# Patient Record
Sex: FEMALE | Race: WHITE | URBAN - METROPOLITAN AREA
[De-identification: names, ages, dates, MRNs, and addresses within clinical notes are randomized per-mention and may not be internally consistent; named-entity substitution may affect disease eponyms.]

---

## 2019-01-09 ENCOUNTER — HOSPITAL ENCOUNTER (OUTPATIENT)
Dept: OTHER | Facility: HOSPITAL | Age: 79
Discharge: HOME | End: 2019-01-09
Attending: INTERNAL MEDICINE

## 2019-01-09 LAB
25(OH)D3 SERPL-MCNC: 32.4 NG/ML (ref 30–100)
ALBUMIN SERPL-MCNC: 3.5 G/DL (ref 3.5–5)
ANION GAP SERPL CALC-SCNC: 16 MMOL/L (ref 8–19)
APPEARANCE UR: CLEAR
BASOPHILS # BLD AUTO: 0.06 10*3/UL (ref 0–0.2)
BASOPHILS NFR BLD AUTO: 0.73 % (ref 0–3)
BILIRUB UR QL: NEGATIVE
BUN SERPL-MCNC: 16 MG/DL (ref 5–25)
BUN/CREAT SERPL: 10 {RATIO} (ref 6–20)
CALCIUM SERPL-MCNC: 9.2 MG/DL (ref 8.7–10.4)
CHLORIDE SERPL-SCNC: 101 MMOL/L (ref 99–111)
COLOR UR: YELLOW
CONV CO2: 27 MMOL/L (ref 22–32)
CONV COLLECTION SOURCE (UA): ABNORMAL
CONV CREATININE URINE, RANDOM: 18.5 MG/DL (ref 10–300)
CONV PROTEIN TO CREATININE RATIO (RANDOM URINE): 0.32 {RATIO} (ref 0–0.1)
CONV UROBILINOGEN IN URINE BY AUTOMATED TEST STRIP: 0.2 {EHRLICHU}/DL (ref 0.1–1)
CREAT UR-MCNC: 1.58 MG/DL (ref 0.5–0.9)
EOSINOPHIL # BLD AUTO: 0.03 10*3/UL (ref 0–0.7)
EOSINOPHIL # BLD AUTO: 0.4 % (ref 0–7)
ERYTHROCYTE [DISTWIDTH] IN BLOOD BY AUTOMATED COUNT: 11.8 % (ref 11.5–14.5)
GFR SERPLBLD BASED ON 1.73 SQ M-ARVRAT: 31 ML/MIN/{1.73_M2}
GLUCOSE SERPL-MCNC: 75 MG/DL (ref 65–99)
GLUCOSE UR QL: NEGATIVE MG/DL
HBA1C MFR BLD: 13.1 G/DL (ref 12–16)
HCT VFR BLD AUTO: 39.4 % (ref 37–47)
HGB UR QL STRIP: NEGATIVE
KETONES UR QL STRIP: NEGATIVE MG/DL
LEUKOCYTE ESTERASE UR QL STRIP: ABNORMAL
LYMPHOCYTES # BLD AUTO: 2.47 10*3/UL (ref 1–5)
MCH RBC QN AUTO: 31.8 PG (ref 27–31)
MCHC RBC AUTO-ENTMCNC: 33.2 G/DL (ref 33–37)
MCV RBC AUTO: 95.8 FL (ref 81–99)
MONOCYTES # BLD AUTO: 0.68 10*3/UL (ref 0.2–1.2)
MONOCYTES NFR BLD AUTO: 8.6 % (ref 3–10)
NEUTROPHILS # BLD AUTO: 4.63 10*3/UL (ref 2–8)
NEUTROPHILS NFR BLD AUTO: 58.9 % (ref 30–85)
NITRITE UR QL STRIP: NEGATIVE
NRBC BLD AUTO-RTO: 0 % (ref 0–0.01)
PH UR STRIP.AUTO: 6 [PH] (ref 5–8)
PHOSPHATE SERPL-MCNC: 3.3 MG/DL (ref 2.4–4.5)
PLATELET # BLD AUTO: 171 10*3/UL (ref 130–400)
PMV BLD AUTO: 7.8 FL (ref 7.4–10.4)
POTASSIUM SERPL-SCNC: 4.2 MMOL/L (ref 3.5–5.3)
PROT UR QL: NEGATIVE MG/DL
PROT UR-MCNC: 6 MG/DL
PTH-INTACT SERPL-MCNC: 153.4 PG/ML (ref 11.1–79.5)
RBC # BLD AUTO: 4.11 10*6/UL (ref 4.2–5.4)
RBC #/AREA URNS HPF: ABNORMAL /[HPF]
SODIUM SERPL-SCNC: 140 MMOL/L (ref 135–147)
SP GR UR: <=1.005 (ref 1–1.03)
SQUAMOUS SPT QL MICRO: ABNORMAL /[HPF]
VARIANT LYMPHS NFR BLD MANUAL: 31.4 % (ref 20–45)
WBC # BLD AUTO: 7.87 10*3/UL (ref 4.8–10.8)
WBC #/AREA URNS HPF: ABNORMAL /[HPF]

## 2019-11-07 ENCOUNTER — OFFICE VISIT CONVERTED (OUTPATIENT)
Dept: FAMILY MEDICINE CLINIC | Facility: CLINIC | Age: 79
End: 2019-11-07
Attending: FAMILY MEDICINE

## 2021-05-09 VITALS
SYSTOLIC BLOOD PRESSURE: 126 MMHG | DIASTOLIC BLOOD PRESSURE: 80 MMHG | OXYGEN SATURATION: 96 % | HEIGHT: 63 IN | HEART RATE: 84 BPM | TEMPERATURE: 97.2 F

## 2023-09-12 ENCOUNTER — HOSPITAL ENCOUNTER (EMERGENCY)
Facility: HOSPITAL | Age: 83
Discharge: HOME | End: 2023-09-12
Attending: EMERGENCY MEDICINE
Payer: MEDICARE

## 2023-09-12 ENCOUNTER — APPOINTMENT (OUTPATIENT)
Dept: GENERAL RADIOLOGY | Facility: HOSPITAL | Age: 83
End: 2023-09-12
Payer: MEDICARE

## 2023-09-12 VITALS
HEART RATE: 107 BPM | RESPIRATION RATE: 13 BRPM | TEMPERATURE: 97.6 F | DIASTOLIC BLOOD PRESSURE: 75 MMHG | WEIGHT: 174.16 LBS | SYSTOLIC BLOOD PRESSURE: 142 MMHG | HEIGHT: 63 IN | OXYGEN SATURATION: 93 % | BODY MASS INDEX: 30.86 KG/M2

## 2023-09-12 DIAGNOSIS — S91.301A WOUND OF RIGHT FOOT: Primary | ICD-10-CM

## 2023-09-12 LAB
ALBUMIN SERPL-MCNC: 3.2 G/DL (ref 3.5–5.2)
ALBUMIN/GLOB SERPL: 1.1 G/DL
ALP SERPL-CCNC: 65 U/L (ref 39–117)
ALT SERPL W P-5'-P-CCNC: 8 U/L (ref 1–33)
ANION GAP SERPL CALCULATED.3IONS-SCNC: 6.3 MMOL/L (ref 5–15)
AST SERPL-CCNC: 13 U/L (ref 1–32)
BASOPHILS # BLD AUTO: 0.04 10*3/MM3 (ref 0–0.2)
BASOPHILS NFR BLD AUTO: 0.5 % (ref 0–1.5)
BILIRUB SERPL-MCNC: 0.2 MG/DL (ref 0–1.2)
BUN SERPL-MCNC: 16 MG/DL (ref 8–23)
BUN/CREAT SERPL: 18.6 (ref 7–25)
CALCIUM SPEC-SCNC: 8.2 MG/DL (ref 8.6–10.5)
CHLORIDE SERPL-SCNC: 94 MMOL/L (ref 98–107)
CO2 SERPL-SCNC: 35.7 MMOL/L (ref 22–29)
CREAT SERPL-MCNC: 0.86 MG/DL (ref 0.57–1)
CRP SERPL-MCNC: 3.67 MG/DL (ref 0–0.5)
DEPRECATED RDW RBC AUTO: 45 FL (ref 37–54)
EGFRCR SERPLBLD CKD-EPI 2021: 66.3 ML/MIN/1.73
EOSINOPHIL # BLD AUTO: 0.26 10*3/MM3 (ref 0–0.4)
EOSINOPHIL NFR BLD AUTO: 3.2 % (ref 0.3–6.2)
ERYTHROCYTE [DISTWIDTH] IN BLOOD BY AUTOMATED COUNT: 13.1 % (ref 12.3–15.4)
ERYTHROCYTE [SEDIMENTATION RATE] IN BLOOD: 16 MM/HR (ref 0–30)
GLOBULIN UR ELPH-MCNC: 2.8 GM/DL
GLUCOSE SERPL-MCNC: 112 MG/DL (ref 65–99)
HCT VFR BLD AUTO: 41 % (ref 34–46.6)
HGB BLD-MCNC: 13.4 G/DL (ref 12–15.9)
HOLD SPECIMEN: NORMAL
HOLD SPECIMEN: NORMAL
IMM GRANULOCYTES # BLD AUTO: 0.03 10*3/MM3 (ref 0–0.05)
IMM GRANULOCYTES NFR BLD AUTO: 0.4 % (ref 0–0.5)
LYMPHOCYTES # BLD AUTO: 2.17 10*3/MM3 (ref 0.7–3.1)
LYMPHOCYTES NFR BLD AUTO: 26.6 % (ref 19.6–45.3)
MCH RBC QN AUTO: 31.2 PG (ref 26.6–33)
MCHC RBC AUTO-ENTMCNC: 32.7 G/DL (ref 31.5–35.7)
MCV RBC AUTO: 95.6 FL (ref 79–97)
MONOCYTES # BLD AUTO: 0.52 10*3/MM3 (ref 0.1–0.9)
MONOCYTES NFR BLD AUTO: 6.4 % (ref 5–12)
NEUTROPHILS NFR BLD AUTO: 5.13 10*3/MM3 (ref 1.7–7)
NEUTROPHILS NFR BLD AUTO: 62.9 % (ref 42.7–76)
NRBC BLD AUTO-RTO: 0 /100 WBC (ref 0–0.2)
PLATELET # BLD AUTO: 243 10*3/MM3 (ref 140–450)
PMV BLD AUTO: 9.7 FL (ref 6–12)
POTASSIUM SERPL-SCNC: 3.2 MMOL/L (ref 3.5–5.2)
PROT SERPL-MCNC: 6 G/DL (ref 6–8.5)
RBC # BLD AUTO: 4.29 10*6/MM3 (ref 3.77–5.28)
SODIUM SERPL-SCNC: 136 MMOL/L (ref 136–145)
WBC NRBC COR # BLD: 8.15 10*3/MM3 (ref 3.4–10.8)
WHOLE BLOOD HOLD COAG: NORMAL
WHOLE BLOOD HOLD SPECIMEN: NORMAL

## 2023-09-12 PROCEDURE — 99283 EMERGENCY DEPT VISIT LOW MDM: CPT

## 2023-09-12 PROCEDURE — 85652 RBC SED RATE AUTOMATED: CPT | Performed by: EMERGENCY MEDICINE

## 2023-09-12 PROCEDURE — 80053 COMPREHEN METABOLIC PANEL: CPT | Performed by: EMERGENCY MEDICINE

## 2023-09-12 PROCEDURE — 73630 X-RAY EXAM OF FOOT: CPT

## 2023-09-12 PROCEDURE — 86140 C-REACTIVE PROTEIN: CPT | Performed by: EMERGENCY MEDICINE

## 2023-09-12 PROCEDURE — 85025 COMPLETE CBC W/AUTO DIFF WBC: CPT | Performed by: EMERGENCY MEDICINE

## 2023-09-12 RX ORDER — DOXYCYCLINE 100 MG/1
100 CAPSULE ORAL 2 TIMES DAILY
Qty: 28 CAPSULE | Refills: 0 | Status: SHIPPED | OUTPATIENT
Start: 2023-09-12

## 2023-09-12 NOTE — ED PROVIDER NOTES
"Time: 5:15 PM EDT  Date of encounter:  9/12/2023  Independent Historian/Clinical History and Information was obtained by:   Patient and Family    History is limited by:     Chief Complaint: Foot wound      History of Present Illness:  Patient is a 83 y.o. year old female who presents to the emergency department for evaluation of foot wound.  Per family she was sent over for a foot wound to her right foot.  Reports that has been there for several weeks to months and had gotten worse.  Was seen by podiatrist today who dressed it and send it to the hospital.  No other complaints this time.    HPI    Patient Care Team  Primary Care Provider: Provider, No Known    Past Medical History:     No Known Allergies  No past medical history on file.  No past surgical history on file.  No family history on file.    Home Medications:  Prior to Admission medications    Not on File        Social History:          Review of Systems:  Review of Systems   Skin:  Positive for wound.      Physical Exam:  /52   Pulse 89   Temp 97.6 °F (36.4 °C) (Oral)   Resp 13   Ht 160 cm (62.99\")   Wt 79 kg (174 lb 2.6 oz)   SpO2 94%   BMI 30.86 kg/m²     Physical Exam  Vitals and nursing note reviewed.   Constitutional:       Appearance: Normal appearance.   HENT:      Head: Normocephalic and atraumatic.   Eyes:      General: No scleral icterus.  Cardiovascular:      Rate and Rhythm: Normal rate and regular rhythm.   Pulmonary:      Effort: Pulmonary effort is normal.      Breath sounds: Normal breath sounds.   Abdominal:      Palpations: Abdomen is soft.      Tenderness: There is no abdominal tenderness.   Musculoskeletal:      Cervical back: Normal range of motion.      Comments: Foot wound to the right foot to the plantar surface at the base of the first digit.  See photo.   Skin:     Findings: No rash.   Neurological:      General: No focal deficit present.      Mental Status: She is alert.          "       Procedures:  Procedures      Medical Decision Making:      Comorbidities that affect care:    Chronic Kidney Disease    External Notes reviewed:    Reviewed nephrology note from 9/26/2022      The following orders were placed and all results were independently analyzed by me:  Orders Placed This Encounter   Procedures    XR Foot 3+ View Right    Comprehensive Metabolic Panel    [place] Draw    CBC Auto Differential    Sedimentation Rate    C-reactive Protein    Inpatient Podiatry Consult    CBC & Differential    Green Top (Gel)    Lavender Top    Gold Top - SST    Light Blue Top       Medications Given in the Emergency Department:  Medications - No data to display     ED Course:    ED Course as of 09/12/23 1838   Tue Sep 12, 2023   1827 Spoke with Dr. Solares who recommended CRP and ESR's.  I called back and spoke with him about the results as well as what appears to be of foreign body noted in the foot.  He recommends outpatient follow-up and prescribe doxycycline. [MA]      ED Course User Index  [MA] Marlo Jay MD       Labs:    Lab Results (last 24 hours)       Procedure Component Value Units Date/Time    CBC & Differential [[id number]]  (Normal) Collected: 09/12/23 1441    Specimen: Blood Updated: 09/12/23 1452    Narrative:      The following orders were created for panel order CBC & Differential.  Procedure                               Abnormality         Status                     ---------                               -----------         ------                     CBC Auto Differential[[id number]]        Normal              Final result                 Please view results for these tests on the individual orders.    CBC Auto Differential [[id number]]  (Normal) Collected: 09/12/23 1441    Specimen: Blood Updated: 09/12/23 1452     WBC 8.15 10*3/mm3      RBC 4.29 10*6/mm3      Hemoglobin 13.4 g/dL      Hematocrit 41.0 %      MCV 95.6 fL      MCH 31.2 pg      MCHC 32.7 g/dL      RDW 13.1 %       RDW-SD 45.0 fl      MPV 9.7 fL      Platelets 243 10*3/mm3      Neutrophil % 62.9 %      Lymphocyte % 26.6 %      Monocyte % 6.4 %      Eosinophil % 3.2 %      Basophil % 0.5 %      Immature Grans % 0.4 %      Neutrophils, Absolute 5.13 10*3/mm3      Lymphocytes, Absolute 2.17 10*3/mm3      Monocytes, Absolute 0.52 10*3/mm3      Eosinophils, Absolute 0.26 10*3/mm3      Basophils, Absolute 0.04 10*3/mm3      Immature Grans, Absolute 0.03 10*3/mm3      nRBC 0.0 /100 WBC     Sedimentation Rate [[id number]]  (Normal) Collected: 09/12/23 1441    Specimen: Blood Updated: 09/12/23 1731     Sed Rate 16 mm/hr     Comprehensive Metabolic Panel [[id number]]  (Abnormal) Collected: 09/12/23 1629    Specimen: Blood Updated: 09/12/23 1710     Glucose 112 mg/dL      BUN 16 mg/dL      Creatinine 0.86 mg/dL      Sodium 136 mmol/L      Potassium 3.2 mmol/L      Chloride 94 mmol/L      CO2 35.7 mmol/L      Calcium 8.2 mg/dL      Total Protein 6.0 g/dL      Albumin 3.2 g/dL      ALT (SGPT) 8 U/L      AST (SGOT) 13 U/L      Alkaline Phosphatase 65 U/L      Total Bilirubin 0.2 mg/dL      Globulin 2.8 gm/dL      A/G Ratio 1.1 g/dL      BUN/Creatinine Ratio 18.6     Anion Gap 6.3 mmol/L      eGFR 66.3 mL/min/1.73     Narrative:      GFR Normal >60  Chronic Kidney Disease <60  Kidney Failure <15    The GFR formula is only valid for adults with stable renal function between ages 18 and 70.    C-reactive Protein [[id number]]  (Abnormal) Collected: 09/12/23 1629    Specimen: Blood Updated: 09/12/23 1745     C-Reactive Protein 3.67 mg/dL              Imaging:    XR Foot 3+ View Right    Result Date: 9/12/2023  PROCEDURE: XR FOOT 3+ VW RIGHT  COMPARISON: None  INDICATIONS: Wound/ulcer at distal end of 1st metatarsal, been there since July  FINDINGS:  There is a metallic linear density measuring 2.5 cm with a L-shaped appearance overlying the plantar surface beneath the 4th and 5th metatarsals.  There is diffuse osteopenia with hyper extension  deformities of the 1st through 5th digits in flexion deformities of the PIP joints.  There is diffuse moderate degenerative change of the mid and hindfoot.  Focal soft tissue swelling is noted prominently beneath the distal aspect of the 1st metatarsal extending to the proximal aspect of the great toe with a defect compatible with ulceration.  No obvious destructive bone lesion identified.        1. Suspected metallic foreign body beneath the mid to lateral aspect of the midfoot.  Please correlate with clinical exam.  Findings concerning for imbedded foreign body. 2. Soft tissue swelling associated with the distal aspect of the medial foot extending into the proximal aspect of the great toe with underlying defect compatible with ulceration.  No obvious destruction of the underlying osseous structures noted given limitations from osteopenia 3. Diffuse degenerative changes noted      ALEXANDER HOGUE MD       Electronically Signed and Approved By: ALEXANDER HOGUE MD on 9/12/2023 at 18:26                Differential Diagnosis and Discussion:    Orthopedic Injuries: Differential diagnosis includes but is not limited to fractures, soft tissue injuries, dislocations, contusions, ligamentous injuries, tendon injuries, nerve injuries, compartment syndrome, bursitis, and vascular injuries.  Osteomyelitis, diabetic foot wound,  All labs were reviewed and interpreted by me.  All X-rays impressions were independently interpreted by me.    MDM     Patient with right foot wound.  Sent in here by podiatry for evaluation.  Labs are unremarkable.  Does appear to have a foreign body noted in the foot.  Spoke with his podiatrist who recommended doxycycline and outpatient follow-up.      Patient Care Considerations:          Consultants/Shared Management Plan:    Consultant: I have discussed the case with Dr. Solares who states recommends outpatient follow-up    Social Determinants of Health:    Patient is independent, reliable, and has  access to care.       Disposition and Care Coordination:    Discharged: The patient is suitable and stable for discharge with no need for consideration of observation or admission.    I have explained the patient´s condition, diagnoses and treatment plan based on the information available to me at this time. I have answered questions and addressed any concerns. The patient has a good  understanding of the patient´s diagnosis, condition, and treatment plan as can be expected at this point. The vital signs have been stable. The patient´s condition is stable and appropriate for discharge from the emergency department.      The patient will pursue further outpatient evaluation with the primary care physician or other designated or consulting physician as outlined in the discharge instructions. They are agreeable to this plan of care and follow-up instructions have been explained in detail. The patient has received these instructions in written format and have expressed an understanding of the discharge instructions. The patient is aware that any significant change in condition or worsening of symptoms should prompt an immediate return to this or the closest emergency department or call to 911.      Final diagnoses:   Wound of right foot        ED Disposition       ED Disposition   Discharge    Condition   Stable    Comment   --               This medical record created using voice recognition software.             Marlo Jay MD  09/12/23 8501

## 2023-10-04 ENCOUNTER — TRANSCRIBE ORDERS (OUTPATIENT)
Dept: ADMINISTRATIVE | Facility: HOSPITAL | Age: 83
End: 2023-10-04
Payer: MEDICARE

## 2023-10-04 DIAGNOSIS — L97.519 ULCER OF RIGHT FOOT, UNSPECIFIED ULCER STAGE: Primary | ICD-10-CM

## 2023-10-09 ENCOUNTER — HOSPITAL ENCOUNTER (OUTPATIENT)
Dept: MRI IMAGING | Facility: HOSPITAL | Age: 83
Discharge: HOME | End: 2023-10-09
Admitting: PODIATRIST
Payer: MEDICARE

## 2023-10-09 DIAGNOSIS — L97.519 ULCER OF RIGHT FOOT, UNSPECIFIED ULCER STAGE: ICD-10-CM

## 2023-10-09 PROCEDURE — 73718 MRI LOWER EXTREMITY W/O DYE: CPT

## 2023-10-18 ENCOUNTER — HOSPITAL ENCOUNTER (EMERGENCY)
Facility: HOSPITAL | Age: 83
Discharge: HOME | End: 2023-10-19
Attending: EMERGENCY MEDICINE
Payer: MEDICARE

## 2023-10-18 ENCOUNTER — APPOINTMENT (OUTPATIENT)
Dept: GENERAL RADIOLOGY | Facility: HOSPITAL | Age: 83
End: 2023-10-18
Payer: MEDICARE

## 2023-10-18 VITALS
BODY MASS INDEX: 31.85 KG/M2 | DIASTOLIC BLOOD PRESSURE: 75 MMHG | RESPIRATION RATE: 14 BRPM | TEMPERATURE: 98.1 F | OXYGEN SATURATION: 98 % | HEART RATE: 72 BPM | SYSTOLIC BLOOD PRESSURE: 144 MMHG | HEIGHT: 62 IN

## 2023-10-18 DIAGNOSIS — S91.339A PENETRATING WOUND OF FOOT, UNSPECIFIED LATERALITY, INITIAL ENCOUNTER: Primary | ICD-10-CM

## 2023-10-18 LAB
ALBUMIN SERPL-MCNC: 3.1 G/DL (ref 3.5–5.2)
ALBUMIN/GLOB SERPL: 1.1 G/DL
ALP SERPL-CCNC: 64 U/L (ref 39–117)
ALT SERPL W P-5'-P-CCNC: 12 U/L (ref 1–33)
ANION GAP SERPL CALCULATED.3IONS-SCNC: 5.8 MMOL/L (ref 5–15)
AST SERPL-CCNC: 21 U/L (ref 1–32)
BASOPHILS # BLD AUTO: 0.03 10*3/MM3 (ref 0–0.2)
BASOPHILS NFR BLD AUTO: 0.4 % (ref 0–1.5)
BILIRUB SERPL-MCNC: 0.3 MG/DL (ref 0–1.2)
BUN SERPL-MCNC: 14 MG/DL (ref 8–23)
BUN/CREAT SERPL: 16.7 (ref 7–25)
CALCIUM SPEC-SCNC: 9.3 MG/DL (ref 8.6–10.5)
CHLORIDE SERPL-SCNC: 92 MMOL/L (ref 98–107)
CO2 SERPL-SCNC: 36.2 MMOL/L (ref 22–29)
CREAT SERPL-MCNC: 0.84 MG/DL (ref 0.57–1)
CRP SERPL-MCNC: <0.3 MG/DL (ref 0–0.5)
D-LACTATE SERPL-SCNC: 0.9 MMOL/L (ref 0.5–2)
DEPRECATED RDW RBC AUTO: 45.5 FL (ref 37–54)
EGFRCR SERPLBLD CKD-EPI 2021: 68.2 ML/MIN/1.73
EOSINOPHIL # BLD AUTO: 0.17 10*3/MM3 (ref 0–0.4)
EOSINOPHIL NFR BLD AUTO: 2.2 % (ref 0.3–6.2)
ERYTHROCYTE [DISTWIDTH] IN BLOOD BY AUTOMATED COUNT: 12.7 % (ref 12.3–15.4)
ERYTHROCYTE [SEDIMENTATION RATE] IN BLOOD: <1 MM/HR (ref 0–30)
GLOBULIN UR ELPH-MCNC: 2.9 GM/DL
GLUCOSE SERPL-MCNC: 100 MG/DL (ref 65–99)
HCT VFR BLD AUTO: 43.7 % (ref 34–46.6)
HGB BLD-MCNC: 13.5 G/DL (ref 12–15.9)
IMM GRANULOCYTES # BLD AUTO: 0.02 10*3/MM3 (ref 0–0.05)
IMM GRANULOCYTES NFR BLD AUTO: 0.3 % (ref 0–0.5)
LYMPHOCYTES # BLD AUTO: 2.27 10*3/MM3 (ref 0.7–3.1)
LYMPHOCYTES NFR BLD AUTO: 29.9 % (ref 19.6–45.3)
MCH RBC QN AUTO: 29.7 PG (ref 26.6–33)
MCHC RBC AUTO-ENTMCNC: 30.9 G/DL (ref 31.5–35.7)
MCV RBC AUTO: 96 FL (ref 79–97)
MONOCYTES # BLD AUTO: 0.61 10*3/MM3 (ref 0.1–0.9)
MONOCYTES NFR BLD AUTO: 8 % (ref 5–12)
NEUTROPHILS NFR BLD AUTO: 4.48 10*3/MM3 (ref 1.7–7)
NEUTROPHILS NFR BLD AUTO: 59.2 % (ref 42.7–76)
NRBC BLD AUTO-RTO: 0 /100 WBC (ref 0–0.2)
PLATELET # BLD AUTO: 195 10*3/MM3 (ref 140–450)
PMV BLD AUTO: 10.3 FL (ref 6–12)
POTASSIUM SERPL-SCNC: 3.9 MMOL/L (ref 3.5–5.2)
PROT SERPL-MCNC: 6 G/DL (ref 6–8.5)
RBC # BLD AUTO: 4.55 10*6/MM3 (ref 3.77–5.28)
SODIUM SERPL-SCNC: 134 MMOL/L (ref 136–145)
WBC NRBC COR # BLD: 7.58 10*3/MM3 (ref 3.4–10.8)

## 2023-10-18 PROCEDURE — 85025 COMPLETE CBC W/AUTO DIFF WBC: CPT | Performed by: EMERGENCY MEDICINE

## 2023-10-18 PROCEDURE — 80053 COMPREHEN METABOLIC PANEL: CPT | Performed by: EMERGENCY MEDICINE

## 2023-10-18 PROCEDURE — 73630 X-RAY EXAM OF FOOT: CPT

## 2023-10-18 PROCEDURE — 85652 RBC SED RATE AUTOMATED: CPT | Performed by: EMERGENCY MEDICINE

## 2023-10-18 PROCEDURE — 99283 EMERGENCY DEPT VISIT LOW MDM: CPT

## 2023-10-18 PROCEDURE — 36415 COLL VENOUS BLD VENIPUNCTURE: CPT | Performed by: EMERGENCY MEDICINE

## 2023-10-18 PROCEDURE — 83605 ASSAY OF LACTIC ACID: CPT | Performed by: EMERGENCY MEDICINE

## 2023-10-18 PROCEDURE — 86140 C-REACTIVE PROTEIN: CPT | Performed by: EMERGENCY MEDICINE

## 2023-10-18 PROCEDURE — 87040 BLOOD CULTURE FOR BACTERIA: CPT | Performed by: EMERGENCY MEDICINE

## 2023-10-18 RX ORDER — CHLORTHALIDONE 25 MG/1
25 TABLET ORAL DAILY
COMMUNITY

## 2023-10-18 RX ORDER — CEPHALEXIN 500 MG/1
500 CAPSULE ORAL 3 TIMES DAILY
Qty: 21 CAPSULE | Refills: 0 | Status: SHIPPED | OUTPATIENT
Start: 2023-10-18

## 2023-10-18 RX ORDER — LEVOTHYROXINE SODIUM 0.12 MG/1
125 TABLET ORAL DAILY
COMMUNITY

## 2023-10-18 RX ORDER — ONDANSETRON 4 MG/1
4 TABLET, FILM COATED ORAL EVERY 8 HOURS PRN
COMMUNITY

## 2023-10-18 RX ORDER — GABAPENTIN 800 MG/1
800 TABLET ORAL 3 TIMES DAILY
COMMUNITY

## 2023-10-18 RX ORDER — ROPINIROLE 0.25 MG/1
0.25 TABLET, FILM COATED ORAL NIGHTLY
COMMUNITY

## 2023-10-18 RX ORDER — HYDROCODONE BITARTRATE AND ACETAMINOPHEN 5; 325 MG/1; MG/1
1 TABLET ORAL EVERY 6 HOURS PRN
COMMUNITY

## 2023-10-18 RX ORDER — NYSTATIN 100000 [USP'U]/G
1 POWDER TOPICAL 2 TIMES DAILY
COMMUNITY

## 2023-10-18 RX ORDER — RALOXIFENE HYDROCHLORIDE 60 MG/1
60 TABLET, FILM COATED ORAL DAILY
COMMUNITY

## 2023-10-18 RX ORDER — POTASSIUM CHLORIDE 750 MG/1
10 CAPSULE, EXTENDED RELEASE ORAL 2 TIMES DAILY
COMMUNITY

## 2023-10-18 RX ORDER — SULFAMETHOXAZOLE AND TRIMETHOPRIM 400; 80 MG/1; MG/1
1 TABLET ORAL 2 TIMES DAILY
COMMUNITY

## 2023-10-18 RX ORDER — HYDROXYCHLOROQUINE SULFATE 200 MG/1
200 TABLET, FILM COATED ORAL DAILY
COMMUNITY

## 2023-10-18 RX ORDER — DOXYCYCLINE HYCLATE 100 MG/1
100 CAPSULE ORAL 2 TIMES DAILY
COMMUNITY

## 2023-10-18 RX ORDER — ALLOPURINOL 100 MG/1
100 TABLET ORAL DAILY
COMMUNITY

## 2023-10-18 RX ORDER — BACLOFEN 10 MG/1
5 TABLET ORAL 3 TIMES DAILY
COMMUNITY

## 2023-10-18 NOTE — ED NOTES
Pt podiatrist reports that they seen a piece of metal in the pt right foot on an xray that they do not know when it was taken. EMS report the office called EMS to the office to transport the pt to the ED.

## 2023-10-18 NOTE — ED PROVIDER NOTES
Time: 5:58 PM EDT  Date of encounter:  10/18/2023  Independent Historian/Clinical History and Information was obtained by:   Patient and Family    History is limited by: N/A    Chief Complaint   Patient presents with    Skin Ulcer         History of Present Illness:  Patient is a 83 y.o. year old female who presents to the emergency department for evaluation of skin ulcer. Was at the podiatrist office and was concerned as there was metal fragment in right foot x-ray and unsure when it got in there. Family says has had wound since June. Denies pain. Has had 2 MRI's scheduled but first was canceled 2/2 having betadine on foot and then per chart review the second was not performed as initial imaging showed metal fragment. Had XR in September that showed metal.  They are unsure when this got in there. Does have peripheral neuropathy at baseline. (Raquel Hedrick PA-C provider in triage 5:58 PM EDT )     Patient Care Team  Primary Care Provider: Provider, No Known    Past Medical History:     Allergies   Allergen Reactions    Penicillins Unknown - High Severity     Past Medical History:   Diagnosis Date    Atrial fibrillation     Difficulty walking     Hypokalemia     Hypothyroidism     Idiopathic progressive neuropathy     Muscle weakness     Systemic lupus      History reviewed. No pertinent surgical history.  History reviewed. No pertinent family history.    Home Medications:  Prior to Admission medications    Medication Sig Start Date End Date Taking? Authorizing Provider   allopurinol (ZYLOPRIM) 100 MG tablet Take 1 tablet by mouth Daily.    ProviderNilesh MD   baclofen (LIORESAL) 10 MG tablet Take 0.5 tablets by mouth 3 (Three) Times a Day.    Nilesh Sharp MD   chlorthalidone (HYGROTON) 25 MG tablet Take 1 tablet by mouth Daily.    Nilesh Sharp MD   doxycycline (MONODOX) 100 MG capsule Take 1 capsule by mouth 2 (Two) Times a Day. 9/12/23   Marlo Jay MD   doxycycline (VIBRAMYCIN)  100 MG capsule Take 1 capsule by mouth 2 (Two) Times a Day.    Nilesh Sharp MD   gabapentin (NEURONTIN) 800 MG tablet Take 1 tablet by mouth 3 (Three) Times a Day.    Nilesh Sharp MD   HYDROcodone-acetaminophen (NORCO) 5-325 MG per tablet Take 1 tablet by mouth Every 6 (Six) Hours As Needed.    Nilesh Sharp MD   hydroxychloroquine (PLAQUENIL) 200 MG tablet Take 1 tablet by mouth Daily.    Nliesh Sharp MD   ipratropium (ATROVENT) 0.02 % nebulizer solution Take 2.5 mL by nebulization 4 (Four) Times a Day.    Nilesh Sharp MD   levothyroxine (SYNTHROID, LEVOTHROID) 125 MCG tablet Take 1 tablet by mouth Daily.    Nilesh Sharp MD   nystatin (MYCOSTATIN) 128775 UNIT/GM powder Apply 1 application  topically to the appropriate area as directed 2 (Two) Times a Day.    Nilesh Sharp MD   ondansetron (ZOFRAN) 4 MG tablet Take 1 tablet by mouth Every 8 (Eight) Hours As Needed for Nausea or Vomiting.    Nilesh Sharp MD   potassium chloride (MICRO-K) 10 MEQ CR capsule Take 1 capsule by mouth 2 (Two) Times a Day.    Nilesh Sharp MD   raloxifene (EVISTA) 60 MG tablet Take 1 tablet by mouth Daily.    Nilesh Sharp MD   rOPINIRole (REQUIP) 0.25 MG tablet Take 1 tablet by mouth Every Night. Take 1 hour before bedtime.    Nilesh Sharp MD   sulfamethoxazole-trimethoprim (BACTRIM,SEPTRA) 400-80 MG tablet Take 1 tablet by mouth 2 (Two) Times a Day.    Nilesh Sharp MD   tuberculin (Tubersol) 5 UNIT/0.1ML injection Inject 0.1 mL into the appropriate area of the skin as directed by provider 1 (One) Time.    Nilesh Sharp MD        Social History:   Social History     Tobacco Use    Smoking status: Never    Smokeless tobacco: Never   Vaping Use    Vaping Use: Never used   Substance Use Topics    Alcohol use: Never    Drug use: Never         Review of Systems:  Review of Systems   Constitutional:  Negative for chills and fever.  "  HENT:  Negative for congestion, rhinorrhea and sore throat.    Eyes:  Negative for pain and visual disturbance.   Respiratory:  Negative for apnea, cough, chest tightness and shortness of breath.    Cardiovascular:  Negative for chest pain and palpitations.   Gastrointestinal:  Negative for abdominal pain, diarrhea, nausea and vomiting.   Genitourinary:  Negative for difficulty urinating and dysuria.   Musculoskeletal:  Negative for joint swelling and myalgias.   Skin:  Negative for color change.   Neurological:  Negative for seizures and headaches.   Psychiatric/Behavioral: Negative.     All other systems reviewed and are negative.       Physical Exam:  /75   Pulse 72   Temp 98.1 °F (36.7 °C) (Oral)   Resp 14   Ht 157.5 cm (62\")   SpO2 98%   BMI 31.85 kg/m²         Physical Exam  Vitals and nursing note reviewed.   Constitutional:       General: She is not in acute distress.     Appearance: Normal appearance. She is not toxic-appearing.   HENT:      Head: Normocephalic and atraumatic.      Jaw: There is normal jaw occlusion.   Eyes:      General: Lids are normal.      Extraocular Movements: Extraocular movements intact.      Conjunctiva/sclera: Conjunctivae normal.      Pupils: Pupils are equal, round, and reactive to light.   Cardiovascular:      Rate and Rhythm: Normal rate and regular rhythm.      Pulses: Normal pulses.      Heart sounds: Normal heart sounds.   Pulmonary:      Effort: Pulmonary effort is normal. No respiratory distress.      Breath sounds: Normal breath sounds. No wheezing or rhonchi.   Abdominal:      General: Abdomen is flat.      Palpations: Abdomen is soft.      Tenderness: There is no abdominal tenderness. There is no guarding or rebound.   Musculoskeletal:         General: Normal range of motion.      Cervical back: Normal range of motion and neck supple.      Right lower leg: No edema.      Left lower leg: No edema.      Comments: (+) Ulceration to the bottom of the right " foot    (-) Purulent drainage   Skin:     General: Skin is warm and dry.   Neurological:      Mental Status: She is alert and oriented to person, place, and time. Mental status is at baseline.   Psychiatric:         Mood and Affect: Mood normal.                      Procedures:  Procedures      Medical Decision Making:      Comorbidities that affect care:    None    External Notes reviewed:    Previous ED note was reviewed and the patient was discharged after a work-up.      The following orders were placed and all results were independently analyzed by me:  Orders Placed This Encounter   Procedures    Blood Culture - Blood,    Blood Culture - Blood,    XR Foot 3+ View Right    Comprehensive Metabolic Panel    Lactic Acid, Plasma    CBC Auto Differential    Sedimentation Rate    C-reactive Protein    CBC & Differential       Medications Given in the Emergency Department:  Medications - No data to display     ED Course:    The patient was initially evaluated in the triage area where orders were placed. The patient was later dispositioned by Sadie Bull MD.      The patient was advised to stay for completion of workup which includes but is not limited to communication of labs and radiological results, reassessment and plan. The patient was advised that leaving prior to disposition by a provider could result in critical findings that are not communicated to the patient.          Labs:    Lab Results (last 24 hours)       Procedure Component Value Units Date/Time    CBC & Differential [[id number]]  (Abnormal) Collected: 10/18/23 1845    Specimen: Blood Updated: 10/18/23 1858    Narrative:      The following orders were created for panel order CBC & Differential.  Procedure                               Abnormality         Status                     ---------                               -----------         ------                     CBC Auto Differential[[id number]]        Abnormal            Final result                  Please view results for these tests on the individual orders.    Blood Culture - Blood, Arm, Left [[id number]] Collected: 10/18/23 1845    Specimen: Blood from Arm, Left Updated: 10/18/23 1855    Blood Culture - Blood, Arm, Right [[id number]] Collected: 10/18/23 1845    Specimen: Blood from Arm, Right Updated: 10/18/23 1855    Lactic Acid, Plasma [[id number]]  (Normal) Collected: 10/18/23 1845    Specimen: Blood Updated: 10/18/23 1936     Lactate 0.9 mmol/L     CBC Auto Differential [[id number]]  (Abnormal) Collected: 10/18/23 1845    Specimen: Blood Updated: 10/18/23 1858     WBC 7.58 10*3/mm3      RBC 4.55 10*6/mm3      Hemoglobin 13.5 g/dL      Hematocrit 43.7 %      MCV 96.0 fL      MCH 29.7 pg      MCHC 30.9 g/dL      RDW 12.7 %      RDW-SD 45.5 fl      MPV 10.3 fL      Platelets 195 10*3/mm3      Neutrophil % 59.2 %      Lymphocyte % 29.9 %      Monocyte % 8.0 %      Eosinophil % 2.2 %      Basophil % 0.4 %      Immature Grans % 0.3 %      Neutrophils, Absolute 4.48 10*3/mm3      Lymphocytes, Absolute 2.27 10*3/mm3      Monocytes, Absolute 0.61 10*3/mm3      Eosinophils, Absolute 0.17 10*3/mm3      Basophils, Absolute 0.03 10*3/mm3      Immature Grans, Absolute 0.02 10*3/mm3      nRBC 0.0 /100 WBC     Sedimentation Rate [[id number]]  (Normal) Collected: 10/18/23 1943    Specimen: Blood Updated: 10/18/23 2019     Sed Rate <1 mm/hr     C-reactive Protein [[id number]]  (Normal) Collected: 10/18/23 1943    Specimen: Blood Updated: 10/18/23 2021     C-Reactive Protein <0.30 mg/dL     Comprehensive Metabolic Panel [[id number]]  (Abnormal) Collected: 10/18/23 2038    Specimen: Blood from Arm, Left Updated: 10/18/23 2113     Glucose 100 mg/dL      BUN 14 mg/dL      Creatinine 0.84 mg/dL      Sodium 134 mmol/L      Potassium 3.9 mmol/L      Comment: Slight hemolysis detected by analyzer. Results may be affected.        Chloride 92 mmol/L      CO2 36.2 mmol/L      Calcium 9.3 mg/dL      Total Protein 6.0 g/dL       Albumin 3.1 g/dL      ALT (SGPT) 12 U/L      AST (SGOT) 21 U/L      Alkaline Phosphatase 64 U/L      Total Bilirubin 0.3 mg/dL      Globulin 2.9 gm/dL      A/G Ratio 1.1 g/dL      BUN/Creatinine Ratio 16.7     Anion Gap 5.8 mmol/L      eGFR 68.2 mL/min/1.73     Narrative:      GFR Normal >60  Chronic Kidney Disease <60  Kidney Failure <15    The GFR formula is only valid for adults with stable renal function between ages 18 and 70.             Imaging:    XR Foot 3+ View Right    Result Date: 10/18/2023  PROCEDURE: XR FOOT 3+ VW RIGHT  COMPARISON: [facility], CR, XR FOOT 3+ VW RIGHT, 9/12/2023, 18:08.  INDICATIONS: ULCER ON BOTTOM OF RIGHT FOOT AT THE 1ST MTP JOINT  FINDINGS:  BONES: The bones are osteopenic.  There is hallux valgus deformity.  Degenerative changes are present in the midfoot and 1st MTP joint.  Calcaneal enthesophytes are present.  No fractures or acute osseous abnormalities are identified. SOFT TISSUES: Negative.  No visible soft tissue swelling.  EFFUSION: None visible.  OTHER: 2.9 cm linear metallic body in the plantar soft tissues of the right foot.       Stable 2.9 cm linear metallic foreign body in the plantar soft tissues of the right foot.  No acute osseous abnormalities are identified.      KOBY WOODARD MD       Electronically Signed and Approved By: KOBY WOODARD MD on 10/18/2023 at 16:43                Differential Diagnosis and Discussion:      Extremity Pain: Differential diagnosis includes but is not limited to soft tissue sprain, tendonitis, tendon injury, dislocation, fracture, deep vein thrombosis, arterial insufficiency, osteoarthritis, bursitis, and ligamentous damage.    All labs were reviewed and interpreted by me.  All X-rays impressions were independently interpreted by me.    MDM     Amount and/or Complexity of Data Reviewed  Clinical lab tests: reviewed  Tests in the radiology section of CPT®: reviewed       The patient´s CBC that was reviewed and  interpreted by me shows no abnormalities of critical concern. Of note, there is no anemia requiring a blood transfusion and the platelet count is acceptable.  The patient´s CMP that was reviewed and interpretted by me shows no abnormalities of critical concern. Of note, the patient´s sodium and potassium are acceptable. The patient´s liver enzymes are unremarkable. The patient´s renal function (creatinine) is preserved. The patient has a normal anion gap.  ESR and CRP are within normal limits.      Patient Care Considerations:    MRI: I considered ordering an MRI however the patient will be seen by podiatry in the next 2 days.      Consultants/Shared Management Plan:    Case was discussed with Dr. Yoon who states that the patient can be discharged with close follow-up in his office in the next 1 to 2 days.    Social Determinants of Health:    Patient has presented with family members who are responsible, reliable and will ensure follow up care.      Disposition and Care Coordination:    Discharged: I considered escalation of care by admitting this patient for observation, however the patient has improved and is suitable and  stable for discharge.    I have explained the patient´s condition, diagnoses and treatment plan based on the information available to me at this time. I have answered questions and addressed any concerns. The patient has a good  understanding of the patient´s diagnosis, condition, and treatment plan as can be expected at this point. The vital signs have been stable. The patient´s condition is stable and appropriate for discharge from the emergency department.      The patient will pursue further outpatient evaluation with the primary care physician or other designated or consulting physician as outlined in the discharge instructions. They are agreeable to this plan of care and follow-up instructions have been explained in detail. The patient has received these instructions in written format  and have expressed an understanding of the discharge instructions. The patient is aware that any significant change in condition or worsening of symptoms should prompt an immediate return to this or the closest emergency department or call to 911.  I have explained discharge medications and the need for follow up with the patient/caretakers. This was also printed in the discharge instructions. Patient was discharged with the following medications and follow up:      Medication List        New Prescriptions      cephalexin 500 MG capsule  Commonly known as: KEFLEX  Take 1 capsule by mouth 3 (Three) Times a Day.               Where to Get Your Medications        These medications were sent to [id number] - [place], [place] - [address] AT [facility] [place] - [phone]  - [phone] fx  [address], [place] [address]      Phone: [phone]   cephalexin 500 MG capsule      Thuan Yoon, DPM  [address]  [place] [id number] [phone]             Final diagnoses:   Penetrating wound of foot, unspecified laterality, initial encounter        ED Disposition       ED Disposition   Discharge    Condition   Stable    Comment   --               This medical record created using voice recognition software.             Sadie Bull MD  10/18/23 [id number]

## 2023-10-23 LAB
BACTERIA SPEC AEROBE CULT: NORMAL
BACTERIA SPEC AEROBE CULT: NORMAL

## 2023-10-25 ENCOUNTER — TRANSCRIBE ORDERS (OUTPATIENT)
Dept: ADMINISTRATIVE | Facility: HOSPITAL | Age: 83
End: 2023-10-25
Payer: MEDICARE

## 2023-10-25 DIAGNOSIS — R09.89 INADEQUATE PERIPHERAL BLOOD FLOW: Primary | ICD-10-CM

## 2023-10-31 ENCOUNTER — TRANSCRIBE ORDERS (OUTPATIENT)
Dept: ADMINISTRATIVE | Facility: HOSPITAL | Age: 83
End: 2023-10-31
Payer: MEDICARE

## 2023-10-31 DIAGNOSIS — R13.12 OROPHARYNGEAL DYSPHAGIA: Primary | ICD-10-CM

## 2023-11-08 ENCOUNTER — HOSPITAL ENCOUNTER (OUTPATIENT)
Facility: HOSPITAL | Age: 83
Discharge: HOME | End: 2023-11-08
Admitting: NURSE PRACTITIONER
Payer: MEDICARE

## 2023-11-08 DIAGNOSIS — R09.89 INADEQUATE PERIPHERAL BLOOD FLOW: ICD-10-CM

## 2023-11-08 LAB
BH CV GRAFT BRACHIAL PRESSURE LEFT: 148 MMHG
BH CV GRAFT BRACHIAL PRESSURE RIGHT: 137 MMHG
BH CV LEA LEFT DPA PRESSURE: 150 MMHG
BH CV LEA LEFT PTA PRESSURE: 149 MMHG
BH CV LEA RIGHT ANT TIBIAL A DISTAL EDV: 0 CM/S
BH CV LEA RIGHT ANT TIBIAL A DISTAL PSV: 77 CM/S
BH CV LEA RIGHT ANT TIBIAL A MID EDV: 0 CM/S
BH CV LEA RIGHT ANT TIBIAL A MID PSV: 69 CM/S
BH CV LEA RIGHT ANT TIBIAL A PROX EDV: 0 CM/S
BH CV LEA RIGHT ANT TIBIAL A PROX PSV: 72 CM/S
BH CV LEA RIGHT CFA DISTAL EDV: 0 CM/S
BH CV LEA RIGHT CFA DISTAL PSV: 122 CM/S
BH CV LEA RIGHT CFA PROX EDV: 0 CM/S
BH CV LEA RIGHT CFA PROX PSV: 105 CM/S
BH CV LEA RIGHT DFA PROX EDV: 0 CM/S
BH CV LEA RIGHT DFA PROX PSV: 65 CM/S
BH CV LEA RIGHT DPA PRESSURE: 153 MMHG
BH CV LEA RIGHT PERONEAL  DISTAL EDV: 0 CM/S
BH CV LEA RIGHT PERONEAL  DISTAL PSV: 29 CM/S
BH CV LEA RIGHT PERONEAL  MID EDV: 0 CM/S
BH CV LEA RIGHT PERONEAL  MID PSV: 49 CM/S
BH CV LEA RIGHT PERONEAL  PROX EDV: 0 CM/S
BH CV LEA RIGHT PERONEAL  PROX PSV: 62 CM/S
BH CV LEA RIGHT POPITEAL A  DISTAL EDV: 0 CM/S
BH CV LEA RIGHT POPITEAL A  DISTAL PSV: 80 CM/S
BH CV LEA RIGHT POPITEAL A  MID EDV: 0 CM/S
BH CV LEA RIGHT POPITEAL A  MID PSV: 74 CM/S
BH CV LEA RIGHT POPITEAL A  PROX EDV: 0 CM/S
BH CV LEA RIGHT POPITEAL A  PROX PSV: 62 CM/S
BH CV LEA RIGHT PTA DISTAL EDV: 4 CM/S
BH CV LEA RIGHT PTA DISTAL PSV: 23 CM/S
BH CV LEA RIGHT PTA MID EDV: 6 CM/S
BH CV LEA RIGHT PTA MID PSV: 20 CM/S
BH CV LEA RIGHT PTA PRESSURE: 148 MMHG
BH CV LEA RIGHT PTA PROX EDV: 0 CM/S
BH CV LEA RIGHT PTA PROX PSV: 53 CM/S
BH CV LEA RIGHT SFA DISTAL EDV: 0 CM/S
BH CV LEA RIGHT SFA DISTAL PSV: 93 CM/S
BH CV LEA RIGHT SFA MID EDV: 0 CM/S
BH CV LEA RIGHT SFA MID PSV: 86 CM/S
BH CV LEA RIGHT SFA PROX EDV: 0 CM/S
BH CV LEA RIGHT SFA PROX PSV: 145 CM/S
BH CV LEA RIGHT TIBEOPERONEAL EDV: 0 CM/S
BH CV LEA RIGHT TIBEOPERONEAL PSV: 71 CM/S
BH CV LOWER ARTERIAL LEFT ABI RATIO: 1.01
BH CV LOWER ARTERIAL RIGHT ABI RATIO: 1.03

## 2023-11-08 PROCEDURE — 93926 LOWER EXTREMITY STUDY: CPT

## 2023-12-12 ENCOUNTER — APPOINTMENT (OUTPATIENT)
Dept: GENERAL RADIOLOGY | Facility: HOSPITAL | Age: 83
DRG: 194 | End: 2023-12-12
Payer: MEDICARE

## 2023-12-12 ENCOUNTER — HOSPITAL ENCOUNTER (INPATIENT)
Facility: HOSPITAL | Age: 83
LOS: 4 days | Discharge: NURSING, REHAB OR LONG-TERM CARE | DRG: 194 | End: 2023-12-16
Attending: EMERGENCY MEDICINE | Admitting: FAMILY MEDICINE
Payer: MEDICARE

## 2023-12-12 ENCOUNTER — APPOINTMENT (OUTPATIENT)
Dept: CT IMAGING | Facility: HOSPITAL | Age: 83
DRG: 194 | End: 2023-12-12
Payer: MEDICARE

## 2023-12-12 DIAGNOSIS — I48.19 PERSISTENT ATRIAL FIBRILLATION: ICD-10-CM

## 2023-12-12 DIAGNOSIS — M32.9 SYSTEMIC LUPUS ERYTHEMATOSUS, UNSPECIFIED SLE TYPE, UNSPECIFIED ORGAN INVOLVEMENT STATUS: ICD-10-CM

## 2023-12-12 DIAGNOSIS — R06.02 SHORTNESS OF BREATH: ICD-10-CM

## 2023-12-12 DIAGNOSIS — R13.12 OROPHARYNGEAL DYSPHAGIA: ICD-10-CM

## 2023-12-12 DIAGNOSIS — R26.2 DIFFICULTY WALKING: ICD-10-CM

## 2023-12-12 DIAGNOSIS — J18.9 PNEUMONIA DUE TO INFECTIOUS ORGANISM, UNSPECIFIED LATERALITY, UNSPECIFIED PART OF LUNG: ICD-10-CM

## 2023-12-12 DIAGNOSIS — N18.4 STAGE 4 CHRONIC KIDNEY DISEASE: ICD-10-CM

## 2023-12-12 DIAGNOSIS — E03.9 HYPOTHYROIDISM (ACQUIRED): ICD-10-CM

## 2023-12-12 DIAGNOSIS — Z86.59 HISTORY OF DEMENTIA: ICD-10-CM

## 2023-12-12 DIAGNOSIS — J96.01 ACUTE RESPIRATORY FAILURE WITH HYPOXIA: Primary | ICD-10-CM

## 2023-12-12 PROBLEM — J96.20 ACUTE-ON-CHRONIC RESPIRATORY FAILURE: Status: ACTIVE | Noted: 2023-12-12

## 2023-12-12 PROBLEM — R06.00 DYSPNEA: Status: ACTIVE | Noted: 2023-12-12

## 2023-12-12 LAB
ALBUMIN SERPL-MCNC: 2.7 G/DL (ref 3.5–5.2)
ALBUMIN/GLOB SERPL: 1.1 G/DL
ALP SERPL-CCNC: 57 U/L (ref 39–117)
ALT SERPL W P-5'-P-CCNC: 6 U/L (ref 1–33)
ANION GAP SERPL CALCULATED.3IONS-SCNC: 7.6 MMOL/L (ref 5–15)
ARTERIAL PATENCY WRIST A: POSITIVE
AST SERPL-CCNC: 13 U/L (ref 1–32)
BACTERIA UR QL AUTO: ABNORMAL /HPF
BASE EXCESS BLDA CALC-SCNC: 11.6 MMOL/L (ref -2–2)
BASOPHILS # BLD AUTO: 0.02 10*3/MM3 (ref 0–0.2)
BASOPHILS NFR BLD AUTO: 0.3 % (ref 0–1.5)
BDY SITE: ABNORMAL
BILIRUB SERPL-MCNC: 0.4 MG/DL (ref 0–1.2)
BILIRUB UR QL STRIP: NEGATIVE
BUN SERPL-MCNC: 7 MG/DL (ref 8–23)
BUN/CREAT SERPL: 14.3 (ref 7–25)
CALCIUM SPEC-SCNC: 8.6 MG/DL (ref 8.6–10.5)
CHLORIDE SERPL-SCNC: 89 MMOL/L (ref 98–107)
CLARITY UR: ABNORMAL
CO2 SERPL-SCNC: 36.4 MMOL/L (ref 22–29)
COHGB MFR BLD: 0.3 % (ref 0–1.5)
COLOR UR: YELLOW
CREAT SERPL-MCNC: 0.49 MG/DL (ref 0.57–1)
D-LACTATE SERPL-SCNC: 1.3 MMOL/L (ref 0.5–2)
DEPRECATED RDW RBC AUTO: 46.4 FL (ref 37–54)
EGFRCR SERPLBLD CKD-EPI 2021: 92.5 ML/MIN/1.73
EOSINOPHIL # BLD AUTO: 0.22 10*3/MM3 (ref 0–0.4)
EOSINOPHIL NFR BLD AUTO: 2.8 % (ref 0.3–6.2)
ERYTHROCYTE [DISTWIDTH] IN BLOOD BY AUTOMATED COUNT: 12.8 % (ref 12.3–15.4)
FHHB: 4.3 % (ref 0–5)
FLUAV SUBTYP SPEC NAA+PROBE: NOT DETECTED
FLUBV RNA ISLT QL NAA+PROBE: NOT DETECTED
GAS FLOW AIRWAY: 2 LPM
GEN 5 2HR TROPONIN T REFLEX: 70 NG/L
GLOBULIN UR ELPH-MCNC: 2.5 GM/DL
GLUCOSE SERPL-MCNC: 96 MG/DL (ref 65–99)
GLUCOSE UR STRIP-MCNC: NEGATIVE MG/DL
HCO3 BLDA-SCNC: 39.5 MMOL/L (ref 22–26)
HCT VFR BLD AUTO: 42 % (ref 34–46.6)
HGB BLD-MCNC: 12.7 G/DL (ref 12–15.9)
HGB BLDA-MCNC: 13.3 G/DL (ref 11.7–14.6)
HGB UR QL STRIP.AUTO: ABNORMAL
HOLD SPECIMEN: NORMAL
HOLD SPECIMEN: NORMAL
HYALINE CASTS UR QL AUTO: ABNORMAL /LPF
IMM GRANULOCYTES # BLD AUTO: 0.03 10*3/MM3 (ref 0–0.05)
IMM GRANULOCYTES NFR BLD AUTO: 0.4 % (ref 0–0.5)
INHALED O2 CONCENTRATION: 28 %
KETONES UR QL STRIP: NEGATIVE
LEUKOCYTE ESTERASE UR QL STRIP.AUTO: NEGATIVE
LYMPHOCYTES # BLD AUTO: 2.02 10*3/MM3 (ref 0.7–3.1)
LYMPHOCYTES NFR BLD AUTO: 25.7 % (ref 19.6–45.3)
MCH RBC QN AUTO: 29.8 PG (ref 26.6–33)
MCHC RBC AUTO-ENTMCNC: 30.2 G/DL (ref 31.5–35.7)
MCV RBC AUTO: 98.6 FL (ref 79–97)
METHGB BLD QL: 0.2 % (ref 0–1.5)
MODALITY: ABNORMAL
MONOCYTES # BLD AUTO: 0.55 10*3/MM3 (ref 0.1–0.9)
MONOCYTES NFR BLD AUTO: 7 % (ref 5–12)
NEUTROPHILS NFR BLD AUTO: 5.01 10*3/MM3 (ref 1.7–7)
NEUTROPHILS NFR BLD AUTO: 63.8 % (ref 42.7–76)
NITRITE UR QL STRIP: NEGATIVE
NRBC BLD AUTO-RTO: 0 /100 WBC (ref 0–0.2)
NT-PROBNP SERPL-MCNC: 1621 PG/ML (ref 0–1800)
OXYHGB MFR BLDV: 95.2 % (ref 94–99)
PCO2 BLDA: 68 MM HG (ref 35–45)
PH BLDA: 7.38 PH UNITS (ref 7.35–7.45)
PH UR STRIP.AUTO: 7.5 [PH] (ref 5–8)
PLATELET # BLD AUTO: 150 10*3/MM3 (ref 140–450)
PMV BLD AUTO: 10.7 FL (ref 6–12)
PO2 BLD: 297 MM[HG] (ref 0–500)
PO2 BLDA: 83.2 MM HG (ref 80–100)
POTASSIUM SERPL-SCNC: 3.8 MMOL/L (ref 3.5–5.2)
PROT SERPL-MCNC: 5.2 G/DL (ref 6–8.5)
PROT UR QL STRIP: NEGATIVE
QT INTERVAL: 317 MS
QT INTERVAL: 343 MS
QTC INTERVAL: 403 MS
QTC INTERVAL: 427 MS
RBC # BLD AUTO: 4.26 10*6/MM3 (ref 3.77–5.28)
RBC # UR STRIP: ABNORMAL /HPF
REF LAB TEST METHOD: ABNORMAL
RSV RNA NPH QL NAA+NON-PROBE: NOT DETECTED
SAO2 % BLDCOA: 95.7 % (ref 95–99)
SARS-COV-2 RNA RESP QL NAA+PROBE: NOT DETECTED
SODIUM SERPL-SCNC: 133 MMOL/L (ref 136–145)
SP GR UR STRIP: 1.02 (ref 1–1.03)
SQUAMOUS #/AREA URNS HPF: ABNORMAL /HPF
TROPONIN T DELTA: 5 NG/L
TROPONIN T SERPL HS-MCNC: 65 NG/L
TSH SERPL DL<=0.05 MIU/L-ACNC: 0.34 UIU/ML (ref 0.27–4.2)
UROBILINOGEN UR QL STRIP: ABNORMAL
WBC # UR STRIP: ABNORMAL /HPF
WBC NRBC COR # BLD AUTO: 7.85 10*3/MM3 (ref 3.4–10.8)
WHOLE BLOOD HOLD COAG: NORMAL
WHOLE BLOOD HOLD SPECIMEN: NORMAL

## 2023-12-12 PROCEDURE — 83880 ASSAY OF NATRIURETIC PEPTIDE: CPT

## 2023-12-12 PROCEDURE — 80053 COMPREHEN METABOLIC PANEL: CPT | Performed by: EMERGENCY MEDICINE

## 2023-12-12 PROCEDURE — 82375 ASSAY CARBOXYHB QUANT: CPT | Performed by: FAMILY MEDICINE

## 2023-12-12 PROCEDURE — 87040 BLOOD CULTURE FOR BACTERIA: CPT | Performed by: EMERGENCY MEDICINE

## 2023-12-12 PROCEDURE — 25010000002 FUROSEMIDE PER 20 MG: Performed by: FAMILY MEDICINE

## 2023-12-12 PROCEDURE — 99285 EMERGENCY DEPT VISIT HI MDM: CPT

## 2023-12-12 PROCEDURE — 84443 ASSAY THYROID STIM HORMONE: CPT | Performed by: FAMILY MEDICINE

## 2023-12-12 PROCEDURE — 83050 HGB METHEMOGLOBIN QUAN: CPT | Performed by: FAMILY MEDICINE

## 2023-12-12 PROCEDURE — 71045 X-RAY EXAM CHEST 1 VIEW: CPT

## 2023-12-12 PROCEDURE — 99223 1ST HOSP IP/OBS HIGH 75: CPT | Performed by: FAMILY MEDICINE

## 2023-12-12 PROCEDURE — 25010000002 AZITHROMYCIN PER 500 MG: Performed by: FAMILY MEDICINE

## 2023-12-12 PROCEDURE — 71260 CT THORAX DX C+: CPT

## 2023-12-12 PROCEDURE — 25010000002 CEFTRIAXONE PER 250 MG: Performed by: EMERGENCY MEDICINE

## 2023-12-12 PROCEDURE — 36600 WITHDRAWAL OF ARTERIAL BLOOD: CPT | Performed by: FAMILY MEDICINE

## 2023-12-12 PROCEDURE — 86738 MYCOPLASMA ANTIBODY: CPT | Performed by: FAMILY MEDICINE

## 2023-12-12 PROCEDURE — 25510000001 IOPAMIDOL PER 1 ML: Performed by: EMERGENCY MEDICINE

## 2023-12-12 PROCEDURE — 84484 ASSAY OF TROPONIN QUANT: CPT | Performed by: EMERGENCY MEDICINE

## 2023-12-12 PROCEDURE — P9612 CATHETERIZE FOR URINE SPEC: HCPCS

## 2023-12-12 PROCEDURE — 93005 ELECTROCARDIOGRAM TRACING: CPT

## 2023-12-12 PROCEDURE — 25810000003 SODIUM CHLORIDE 0.9 % SOLUTION: Performed by: FAMILY MEDICINE

## 2023-12-12 PROCEDURE — 82805 BLOOD GASES W/O2 SATURATION: CPT | Performed by: FAMILY MEDICINE

## 2023-12-12 PROCEDURE — 81001 URINALYSIS AUTO W/SCOPE: CPT | Performed by: EMERGENCY MEDICINE

## 2023-12-12 PROCEDURE — 87637 SARSCOV2&INF A&B&RSV AMP PRB: CPT | Performed by: EMERGENCY MEDICINE

## 2023-12-12 PROCEDURE — 85025 COMPLETE CBC W/AUTO DIFF WBC: CPT

## 2023-12-12 PROCEDURE — 93005 ELECTROCARDIOGRAM TRACING: CPT | Performed by: FAMILY MEDICINE

## 2023-12-12 PROCEDURE — 83605 ASSAY OF LACTIC ACID: CPT | Performed by: EMERGENCY MEDICINE

## 2023-12-12 PROCEDURE — 93005 ELECTROCARDIOGRAM TRACING: CPT | Performed by: EMERGENCY MEDICINE

## 2023-12-12 PROCEDURE — 36415 COLL VENOUS BLD VENIPUNCTURE: CPT

## 2023-12-12 RX ORDER — SODIUM CHLORIDE 0.9 % (FLUSH) 0.9 %
10 SYRINGE (ML) INJECTION EVERY 12 HOURS SCHEDULED
Status: DISCONTINUED | OUTPATIENT
Start: 2023-12-12 | End: 2023-12-16 | Stop reason: HOSPADM

## 2023-12-12 RX ORDER — BISACODYL 5 MG/1
5 TABLET, DELAYED RELEASE ORAL DAILY PRN
Status: DISCONTINUED | OUTPATIENT
Start: 2023-12-12 | End: 2023-12-16 | Stop reason: HOSPADM

## 2023-12-12 RX ORDER — FUROSEMIDE 10 MG/ML
40 INJECTION INTRAMUSCULAR; INTRAVENOUS ONCE
Status: COMPLETED | OUTPATIENT
Start: 2023-12-12 | End: 2023-12-12

## 2023-12-12 RX ORDER — SODIUM CHLORIDE 9 MG/ML
40 INJECTION, SOLUTION INTRAVENOUS AS NEEDED
Status: DISCONTINUED | OUTPATIENT
Start: 2023-12-12 | End: 2023-12-16 | Stop reason: HOSPADM

## 2023-12-12 RX ORDER — GUAIFENESIN 600 MG/1
600 TABLET, EXTENDED RELEASE ORAL EVERY 12 HOURS SCHEDULED
Status: DISCONTINUED | OUTPATIENT
Start: 2023-12-12 | End: 2023-12-12 | Stop reason: CLARIF

## 2023-12-12 RX ORDER — CEFTRIAXONE SODIUM 1 G/50ML
1000 INJECTION, SOLUTION INTRAVENOUS EVERY 24 HOURS
Status: DISCONTINUED | OUTPATIENT
Start: 2023-12-13 | End: 2023-12-14

## 2023-12-12 RX ORDER — HYDROCODONE BITARTRATE AND ACETAMINOPHEN 10; 325 MG/1; MG/1
1 TABLET ORAL EVERY 6 HOURS PRN
Status: ON HOLD | COMMUNITY
End: 2023-12-16 | Stop reason: SDUPTHER

## 2023-12-12 RX ORDER — ENOXAPARIN SODIUM 100 MG/ML
40 INJECTION SUBCUTANEOUS DAILY
Status: DISCONTINUED | OUTPATIENT
Start: 2023-12-13 | End: 2023-12-16 | Stop reason: HOSPADM

## 2023-12-12 RX ORDER — BENZONATATE 100 MG/1
200 CAPSULE ORAL 3 TIMES DAILY PRN
Status: DISCONTINUED | OUTPATIENT
Start: 2023-12-12 | End: 2023-12-16 | Stop reason: HOSPADM

## 2023-12-12 RX ORDER — SODIUM CHLORIDE 0.9 % (FLUSH) 0.9 %
10 SYRINGE (ML) INJECTION AS NEEDED
Status: DISCONTINUED | OUTPATIENT
Start: 2023-12-12 | End: 2023-12-16 | Stop reason: HOSPADM

## 2023-12-12 RX ORDER — CEFTRIAXONE SODIUM 1 G/50ML
1000 INJECTION, SOLUTION INTRAVENOUS ONCE
Status: COMPLETED | OUTPATIENT
Start: 2023-12-12 | End: 2023-12-12

## 2023-12-12 RX ORDER — HYDROXYCHLOROQUINE SULFATE 200 MG/1
200 TABLET, FILM COATED ORAL DAILY
Status: DISCONTINUED | OUTPATIENT
Start: 2023-12-13 | End: 2023-12-16 | Stop reason: HOSPADM

## 2023-12-12 RX ORDER — IPRATROPIUM BROMIDE AND ALBUTEROL SULFATE 2.5; .5 MG/3ML; MG/3ML
3 SOLUTION RESPIRATORY (INHALATION) EVERY 4 HOURS PRN
Status: DISCONTINUED | OUTPATIENT
Start: 2023-12-12 | End: 2023-12-16 | Stop reason: HOSPADM

## 2023-12-12 RX ORDER — HYDROCODONE BITARTRATE AND ACETAMINOPHEN 7.5; 325 MG/1; MG/1
1 TABLET ORAL ONCE
Status: COMPLETED | OUTPATIENT
Start: 2023-12-12 | End: 2023-12-12

## 2023-12-12 RX ORDER — GABAPENTIN 400 MG/1
800 CAPSULE ORAL EVERY 8 HOURS SCHEDULED
Status: DISCONTINUED | OUTPATIENT
Start: 2023-12-12 | End: 2023-12-16 | Stop reason: HOSPADM

## 2023-12-12 RX ORDER — BISACODYL 10 MG
10 SUPPOSITORY, RECTAL RECTAL DAILY PRN
Status: DISCONTINUED | OUTPATIENT
Start: 2023-12-12 | End: 2023-12-16 | Stop reason: HOSPADM

## 2023-12-12 RX ORDER — POLYETHYLENE GLYCOL 3350 17 G/17G
17 POWDER, FOR SOLUTION ORAL DAILY PRN
Status: DISCONTINUED | OUTPATIENT
Start: 2023-12-12 | End: 2023-12-16 | Stop reason: HOSPADM

## 2023-12-12 RX ORDER — LEVOTHYROXINE SODIUM 0.12 MG/1
125 TABLET ORAL
Status: DISCONTINUED | OUTPATIENT
Start: 2023-12-13 | End: 2023-12-16 | Stop reason: HOSPADM

## 2023-12-12 RX ORDER — NYSTATIN 100000 [USP'U]/G
POWDER TOPICAL EVERY 12 HOURS SCHEDULED
Status: DISCONTINUED | OUTPATIENT
Start: 2023-12-12 | End: 2023-12-16 | Stop reason: HOSPADM

## 2023-12-12 RX ORDER — ROPINIROLE 0.25 MG/1
0.5 TABLET, FILM COATED ORAL NIGHTLY
Status: DISCONTINUED | OUTPATIENT
Start: 2023-12-12 | End: 2023-12-16 | Stop reason: HOSPADM

## 2023-12-12 RX ORDER — AMOXICILLIN 250 MG
2 CAPSULE ORAL 2 TIMES DAILY
Status: DISCONTINUED | OUTPATIENT
Start: 2023-12-12 | End: 2023-12-16 | Stop reason: HOSPADM

## 2023-12-12 RX ORDER — GUAIFENESIN 200 MG/10ML
300 LIQUID ORAL EVERY 6 HOURS
Status: DISCONTINUED | OUTPATIENT
Start: 2023-12-12 | End: 2023-12-16 | Stop reason: HOSPADM

## 2023-12-12 RX ORDER — SENNOSIDES A AND B 8.6 MG/1
1 TABLET, FILM COATED ORAL DAILY
COMMUNITY

## 2023-12-12 RX ORDER — HYDROCODONE BITARTRATE AND ACETAMINOPHEN 10; 325 MG/1; MG/1
1 TABLET ORAL EVERY 6 HOURS PRN
Status: DISCONTINUED | OUTPATIENT
Start: 2023-12-12 | End: 2023-12-16 | Stop reason: HOSPADM

## 2023-12-12 RX ADMIN — AZITHROMYCIN 500 MG: 500 INJECTION, POWDER, LYOPHILIZED, FOR SOLUTION INTRAVENOUS at 22:35

## 2023-12-12 RX ADMIN — NYSTATIN: 100000 POWDER TOPICAL at 22:34

## 2023-12-12 RX ADMIN — GABAPENTIN 800 MG: 400 CAPSULE ORAL at 23:31

## 2023-12-12 RX ADMIN — ROPINIROLE HYDROCHLORIDE 0.5 MG: 0.25 TABLET, FILM COATED ORAL at 23:31

## 2023-12-12 RX ADMIN — FUROSEMIDE 40 MG: 10 INJECTION, SOLUTION INTRAMUSCULAR; INTRAVENOUS at 21:46

## 2023-12-12 RX ADMIN — Medication 10 ML: at 21:45

## 2023-12-12 RX ADMIN — HYDROCODONE BITARTRATE AND ACETAMINOPHEN 1 TABLET: 7.5; 325 TABLET ORAL at 17:41

## 2023-12-12 RX ADMIN — CEFTRIAXONE SODIUM 1000 MG: 1 INJECTION, SOLUTION INTRAVENOUS at 18:01

## 2023-12-12 RX ADMIN — GUAIFENESIN 300 MG: 200 SOLUTION ORAL at 23:31

## 2023-12-12 RX ADMIN — IOPAMIDOL 100 ML: 755 INJECTION, SOLUTION INTRAVENOUS at 16:56

## 2023-12-12 NOTE — ED NOTES
Pt complaining of pain in her legs, order obtained from Dr. Jay to given her home pain medication which he is ordering.

## 2023-12-12 NOTE — Clinical Note
Level of Care: Remote Telemetry [26]   Diagnosis: Dyspnea [837125]   Admitting Physician: MAYTE MCKAY [[id number]]

## 2023-12-12 NOTE — ED PROVIDER NOTES
Time: 4:01 PM EST  Date of encounter:  12/12/2023  Independent Historian/Clinical History and Information was obtained by:   Patient and Family    History is limited by: N/A    Chief Complaint: Shortness of breath      History of Present Illness:  Patient is a 83 y.o. year old female who presents to the emergency department for evaluation of shortness of breath.  Patient has a history of A-fib, lupus, generalized weakness who presents with complaints of shortness of breath and hypoxia.  Patient chronically on 2 L nasal cannula at the nursing home.  Had oxygen saturations in the 70s and 80s and was sent here for further eval.  They report her oxygen is low when she is laying flat but is improved when she sits up.  She states that she has had a cough and just has not felt well.  Family reports that she has been on 2 rounds of antibiotics recently for pneumonia.  No other complaints this time    HPI    Patient Care Team  Primary Care Provider: Brady Rae MD    Past Medical History:     Allergies   Allergen Reactions    Penicillins Unknown - High Severity     Past Medical History:   Diagnosis Date    Atrial fibrillation     Difficulty walking     Hypokalemia     Hypothyroidism     Idiopathic progressive neuropathy     Muscle weakness     Systemic lupus      History reviewed. No pertinent surgical history.  History reviewed. No pertinent family history.    Home Medications:  Prior to Admission medications    Medication Sig Start Date End Date Taking? Authorizing Provider   allopurinol (ZYLOPRIM) 100 MG tablet Take 1 tablet by mouth Daily.    ProviderNilesh MD   baclofen (LIORESAL) 10 MG tablet Take 0.5 tablets by mouth 3 (Three) Times a Day.    Nilesh Sharp MD   cephalexin (KEFLEX) 500 MG capsule Take 1 capsule by mouth 3 (Three) Times a Day. 10/18/23   Sadie Bull MD   chlorthalidone (HYGROTON) 25 MG tablet Take 1 tablet by mouth Daily.    ProviderNilesh MD   doxycycline  (MONODOX) 100 MG capsule Take 1 capsule by mouth 2 (Two) Times a Day. 9/12/23   Marlo Jay MD   doxycycline (VIBRAMYCIN) 100 MG capsule Take 1 capsule by mouth 2 (Two) Times a Day.    Nilesh Sharp MD   gabapentin (NEURONTIN) 800 MG tablet Take 1 tablet by mouth 3 (Three) Times a Day.    Nilesh Sharp MD   HYDROcodone-acetaminophen (NORCO) 5-325 MG per tablet Take 1 tablet by mouth Every 6 (Six) Hours As Needed.    Nilesh Sharp MD   hydroxychloroquine (PLAQUENIL) 200 MG tablet Take 1 tablet by mouth Daily.    Nilesh Sharp MD   ipratropium (ATROVENT) 0.02 % nebulizer solution Take 2.5 mL by nebulization 4 (Four) Times a Day.    Nilesh Sharp MD   levothyroxine (SYNTHROID, LEVOTHROID) 125 MCG tablet Take 1 tablet by mouth Daily.    Nilesh Sharp MD   nystatin (MYCOSTATIN) 521014 UNIT/GM powder Apply 1 application  topically to the appropriate area as directed 2 (Two) Times a Day.    Nilesh Sharp MD   ondansetron (ZOFRAN) 4 MG tablet Take 1 tablet by mouth Every 8 (Eight) Hours As Needed for Nausea or Vomiting.    Nilesh Sharp MD   potassium chloride (MICRO-K) 10 MEQ CR capsule Take 1 capsule by mouth 2 (Two) Times a Day.    Nilesh Sharp MD   raloxifene (EVISTA) 60 MG tablet Take 1 tablet by mouth Daily.    Nilesh Sharp MD   rOPINIRole (REQUIP) 0.25 MG tablet Take 1 tablet by mouth Every Night. Take 1 hour before bedtime.    Nilesh Sharp MD   sulfamethoxazole-trimethoprim (BACTRIM,SEPTRA) 400-80 MG tablet Take 1 tablet by mouth 2 (Two) Times a Day.    Nilesh Sharp MD   tuberculin (Tubersol) 5 UNIT/0.1ML injection Inject 0.1 mL into the appropriate area of the skin as directed by provider 1 (One) Time.    Nilesh Sharp MD        Social History:   Social History     Tobacco Use    Smoking status: Never    Smokeless tobacco: Never   Vaping Use    Vaping Use: Never used   Substance Use Topics    Alcohol  "use: Never    Drug use: Never         Review of Systems:  Review of Systems   Respiratory:  Positive for shortness of breath.         Physical Exam:  /95   Pulse 111   Temp 97.5 °F (36.4 °C) (Oral)   Resp 22   Ht 162.6 cm (64\")   Wt 74.6 kg (164 lb 7.4 oz)   SpO2 98%   BMI 28.23 kg/m²     Physical Exam  Vitals and nursing note reviewed.   Constitutional:       Appearance: Normal appearance.   HENT:      Head: Normocephalic and atraumatic.   Eyes:      General: No scleral icterus.  Cardiovascular:      Rate and Rhythm: Normal rate and regular rhythm.      Heart sounds: Normal heart sounds.   Pulmonary:      Comments: Decreased inspiration, decreased breath sounds bilaterally.  Coarse breath sounds noted on exam.  Abdominal:      Palpations: Abdomen is soft.      Tenderness: There is no abdominal tenderness.   Musculoskeletal:         General: Normal range of motion.      Cervical back: Normal range of motion.   Skin:     Findings: No rash.   Neurological:      General: No focal deficit present.      Mental Status: She is alert.                  Procedures:  Procedures      Medical Decision Making:      Comorbidities that affect care:    COPD    External Notes reviewed:    Reviewed ER note from 10/18/2023      The following orders were placed and all results were independently analyzed by me:  Orders Placed This Encounter   Procedures    COVID-19, FLU A/B, RSV PCR 1 HR TAT - Swab, Nasopharynx    Blood Culture - Blood,    Blood Culture - Blood,    XR Chest 1 View    CT Chest With Contrast Diagnostic    [place] Draw    Comprehensive Metabolic Panel    BNP    Single High Sensitivity Troponin T    CBC Auto Differential    High Sensitivity Troponin T 2Hr    Urinalysis With Culture If Indicated - Urine, Clean Catch    Urinalysis, Microscopic Only - Urine, Clean Catch    Lactic Acid, Plasma    NPO Diet NPO Type: Strict NPO    Undress & Gown    Continuous Pulse Oximetry    Vital Signs    Inpatient Hospitalist " Consult    Oxygen Therapy- Nasal Cannula; Titrate 1-6 LPM Per SpO2; 90 - 95%    ECG 12 Lead ED Triage Standing Order; SOA    Insert Peripheral IV    CBC & Differential    Green Top (Gel)    Lavender Top    Gold Top - SST    Light Blue Top       Medications Given in the Emergency Department:  Medications   sodium chloride 0.9 % flush 10 mL (has no administration in time range)   cefTRIAXone (ROCEPHIN) IVPB 1,000 mg (1,000 mg Intravenous New Bag 12/12/23 1801)   iopamidol (ISOVUE-370) 76 % injection 100 mL (100 mL Intravenous Given 12/12/23 1656)   HYDROcodone-acetaminophen (NORCO) 7.5-325 MG per tablet 1 tablet (1 tablet Oral Given 12/12/23 1741)        ED Course:    ED Course as of 12/12/23 1824   Tue Dec 12, 2023   1558 EKG interpreted by me  Time: 1423  Heart rate 102  Sinus with multiple PACs, LVH with repolarization abnormalities, nonspecific ST changes [MA]   1738 Family does report that she has been treated with antibiotics for the last couple weeks but has not had improvement.  Discussed admission to the hospital for further workup management.  Will place on antibiotics and admit [MA]   1823 Spoke with Dr. Hinojosa who agrees to admit [MA]      ED Course User Index  [MA] Marlo Jay MD       Labs:    Lab Results (last 24 hours)       Procedure Component Value Units Date/Time    CBC & Differential [[id number]]  (Abnormal) Collected: 12/12/23 1438    Specimen: Blood Updated: 12/12/23 1446    Narrative:      The following orders were created for panel order CBC & Differential.  Procedure                               Abnormality         Status                     ---------                               -----------         ------                     CBC Auto Differential[[id number]]        Abnormal            Final result                 Please view results for these tests on the individual orders.    BNP [[id number]]  (Normal) Collected: 12/12/23 1438    Specimen: Blood Updated: 12/12/23 1503     proBNP  1,621.0 pg/mL     Narrative:      This assay is used as an aid in the diagnosis of individuals suspected of having heart failure. It can be used as an aid in the diagnosis of acute decompensated heart failure (ADHF) in patients presenting with signs and symptoms of ADHF to the emergency department (ED). In addition, NT-proBNP of <300 pg/mL indicates ADHF is not likely.    Age Range Result Interpretation  NT-proBNP Concentration (pg/mL:      <50             Positive            >450                   Gray                 300-450                    Negative             <300    50-75           Positive            >900                  Gray                300-900                  Negative            <300      >75             Positive            >1800                  Gray                300-1800                  Negative            <300    CBC Auto Differential [[id number]]  (Abnormal) Collected: 12/12/23 1438    Specimen: Blood Updated: 12/12/23 1446     WBC 7.85 10*3/mm3      RBC 4.26 10*6/mm3      Hemoglobin 12.7 g/dL      Hematocrit 42.0 %      MCV 98.6 fL      MCH 29.8 pg      MCHC 30.2 g/dL      RDW 12.8 %      RDW-SD 46.4 fl      MPV 10.7 fL      Platelets 150 10*3/mm3      Neutrophil % 63.8 %      Lymphocyte % 25.7 %      Monocyte % 7.0 %      Eosinophil % 2.8 %      Basophil % 0.3 %      Immature Grans % 0.4 %      Neutrophils, Absolute 5.01 10*3/mm3      Lymphocytes, Absolute 2.02 10*3/mm3      Monocytes, Absolute 0.55 10*3/mm3      Eosinophils, Absolute 0.22 10*3/mm3      Basophils, Absolute 0.02 10*3/mm3      Immature Grans, Absolute 0.03 10*3/mm3      nRBC 0.0 /100 WBC     Comprehensive Metabolic Panel [[id number]]  (Abnormal) Collected: 12/12/23 1520    Specimen: Blood from Arm, Left Updated: 12/12/23 1601     Glucose 96 mg/dL      BUN 7 mg/dL      Creatinine 0.49 mg/dL      Sodium 133 mmol/L      Potassium 3.8 mmol/L      Chloride 89 mmol/L      CO2 36.4 mmol/L      Calcium 8.6 mg/dL      Total Protein 5.2  g/dL      Albumin 2.7 g/dL      ALT (SGPT) 6 U/L      AST (SGOT) 13 U/L      Alkaline Phosphatase 57 U/L      Total Bilirubin 0.4 mg/dL      Globulin 2.5 gm/dL      A/G Ratio 1.1 g/dL      BUN/Creatinine Ratio 14.3     Anion Gap 7.6 mmol/L      eGFR 92.5 mL/min/1.73     Narrative:      GFR Normal >60  Chronic Kidney Disease <60  Kidney Failure <15    The GFR formula is only valid for adults with stable renal function between ages 18 and 70.    Single High Sensitivity Troponin T [[id number]]  (Abnormal) Collected: 12/12/23 1520    Specimen: Blood from Arm, Left Updated: 12/12/23 1600     HS Troponin T 65 ng/L     Narrative:      High Sensitive Troponin T Reference Range:  <14.0 ng/L- Negative Female for AMI  <22.0 ng/L- Negative Male for AMI  >=14 - Abnormal Female indicating possible myocardial injury.  >=22 - Abnormal Male indicating possible myocardial injury.   Clinicians would have to utilize clinical acumen, EKG, Troponin, and serial changes to determine if it is an Acute Myocardial Infarction or myocardial injury due to an underlying chronic condition.         COVID-19, FLU A/B, RSV PCR 1 HR TAT - Swab, Nasopharynx [[id number]]  (Normal) Collected: 12/12/23 1609    Specimen: Swab from Nasopharynx Updated: 12/12/23 1710     COVID19 Not Detected     Influenza A PCR Not Detected     Influenza B PCR Not Detected     RSV, PCR Not Detected    Narrative:      Fact sheet for providers: https://www.fda.gov/media/263233/download    Fact sheet for patients: https://www.fda.gov/media/939604/download    Test performed by PCR.    Urinalysis With Culture If Indicated - Straight Cath [[id number]]  (Abnormal) Collected: 12/12/23 1619    Specimen: Urine from Straight Cath Updated: 12/12/23 1727     Color, UA Yellow     Appearance, UA Slightly Cloudy     pH, UA 7.5     Specific Gravity, UA 1.020     Glucose, UA Negative     Ketones, UA Negative     Bilirubin, UA Negative     Blood, UA Trace     Protein, UA Negative     Leuk  Esterase, UA Negative     Nitrite, UA Negative     Urobilinogen, UA 0.2 E.U./dL    Narrative:      In absence of clinical symptoms, the presence of pyuria, bacteria, and/or nitrites on the urinalysis result does not correlate with infection.    Urinalysis, Microscopic Only - Straight Cath [[id number]]  (Abnormal) Collected: 12/12/23 1619    Specimen: Urine from Straight Cath Updated: 12/12/23 1802     RBC, UA 0-2 /HPF      WBC, UA 0-2 /HPF      Comment: Urine culture not indicated.        Bacteria, UA Trace /HPF      Squamous Epithelial Cells, UA None Seen /HPF      Hyaline Casts, UA None Seen /LPF      Methodology Manual Light Microscopy    Lactic Acid, Plasma [[id number]] Collected: 12/12/23 1755    Specimen: Blood Updated: 12/12/23 1816    Blood Culture - Blood, Arm, Left [[id number]] Collected: 12/12/23 1755    Specimen: Blood from Arm, Left Updated: 12/12/23 1816    Blood Culture - Blood, Arm, Left [[id number]] Collected: 12/12/23 1801    Specimen: Blood from Arm, Left Updated: 12/12/23 1816             Imaging:    CT Chest With Contrast Diagnostic    Result Date: 12/12/2023  PROCEDURE: CT CHEST W CONTRAST DIAGNOSTIC  COMPARISON:  [facility], CR, XR CHEST 1 VW, 12/12/2023, 15:09. INDICATIONS: Short of air, cough  TECHNIQUE: After obtaining the patient's consent, CT images were obtained with non-ionic intravenous contrast material.   PROTOCOL:   Standard imaging protocol performed    RADIATION:   DLP: 617.7mGy*cm   Automated exposure control was utilized to minimize radiation dose. CONTRAST: 100cc Isovue 370 I.V.  FINDINGS:  Pulmonary arteries: there is excellent opacification of the pulmonary arteries.  No intraluminal filling defects are seen.  There is limited evaluation of the subsegmental lower lobe pulmonary arteries due to consolidation.  Main, right left pulmonary artery are upper limits normal in size.  There is cardiomegaly.  There are no pathologically enlarged hilar or mediastinal lymph  nodes.  No significant coronary artery atherosclerotic calcification.  Thoracic aorta has normal caliber.  No pericardial effusion.  There are1 small bilateral pleural effusions.  There is bilateral lower lobe consolidation with volume loss.  Atelectasis versus pneumonia.  There is linear atelectasis in the anterior inferior right upper lobe.  There are no endobronchial lesions.  There are no suspicious pulmonary nodules.  Upper abdomen is unremarkable.  Soft tissues are unremarkable.  There are no aggressive focal lytic or sclerotic osseous lesions.  There is osteopenia.          1. No findings of pulmonary embolus.  Limited evaluation of the lower lobe subsegmental pulmonary arteries due to volume loss and pleural effusions and consolidation. 2. Bibasilar airspace opacities atelectasis versus pneumonia with small bilateral pleural effusions.  Recommend clinical correlation. 3. Mild cardiomegaly.     JUANA HURTADO MD       Electronically Signed and Approved By: JUANA HURTADO MD on 12/12/2023 at 17:04             XR Chest 1 View    Result Date: 12/12/2023  PROCEDURE: XR CHEST 1 VW  COMPARISON: [facility], , CHEST AP/PA 1 VIEW, 7/13/2019, 12:31.  INDICATIONS: cough congestion  FINDINGS:  The depth of inspiration is poor.  There are atelectatic changes in the right upper and left basilar area.  There are no large pleural effusions.        1. Poor inspiration. 2. Atelectatic changes noted involving the lungs.  Whether in part some of the findings may be secondary to underlying multi focal pneumonia is uncertain.       KAYDEN VILLASEÑOR MD       Electronically Signed and Approved By: KAYDEN VILLASEÑOR MD on 12/12/2023 at 15:11                Differential Diagnosis and Discussion:    Dyspnea: Differential diagnosis includes but is not limited to metabolic acidosis, neurological disorders, psychogenic, asthma, pneumothorax, upper airway obstruction, COPD, pneumonia, noncardiogenic pulmonary edema, interstitial lung  disease, anemia, congestive heart failure, and pulmonary embolism    All labs were reviewed and interpreted by me.  All X-rays impressions were independently interpreted by me.  EKG was interpreted by me.  CT scan radiology impression was interpreted by me.    MDM     Amount and/or Complexity of Data Reviewed  Decide to obtain previous medical records or to obtain history from someone other than the patient: yes       Patient is 83-year-old female who presents with complaints of shortness of breath.  Has been treated for pneumonia over the last couple weeks and has had a general decline over that time.  She also reports that her oxygen has been dropping into the 80s and 70s despite using oxygen at the nursing home.  Is better when she sits up or worse when she is laying down.  CT shows what appears to be pneumonia with some pleural effusions.  She is not taking great inspirations here in the emergency room.  She is not hypoxic on her 2 L sitting up but is worse when she is moving or laying flat.  Will need admission to the hospital for further workup management.  Will cover with antibiotics.          Patient Care Considerations:          Consultants/Shared Management Plan:    Hospitalist: I have discussed the case with Dr. Hinojosa who agrees to accept the patient for admission.    Social Determinants of Health:          Disposition and Care Coordination:    Admit:   Through independent evaluation of the patient's history, physical, and imperical data, the patient meets criteria for observation/admission to the hospital.        Final diagnoses:   Acute respiratory failure with hypoxia   Shortness of breath   Pneumonia due to infectious organism, unspecified laterality, unspecified part of lung        ED Disposition       ED Disposition   Decision to Admit    Condition   --    Comment   --               This medical record created using voice recognition software.             Marlo Jay MD  12/12/23 [id number]

## 2023-12-13 LAB
L PNEUMO1 AG UR QL IA: NEGATIVE
M PNEUMO IGM SER QL: POSITIVE
PROCALCITONIN SERPL-MCNC: 0.04 NG/ML (ref 0–0.25)
QT INTERVAL: 358 MS
QTC INTERVAL: 441 MS
S PNEUM AG SPEC QL LA: NEGATIVE
T-UPTAKE NFR SERPL: 0.96 TBI (ref 0.8–1.3)
T4 SERPL-MCNC: 8.92 MCG/DL (ref 4.5–11.7)
TROPONIN T SERPL HS-MCNC: 82 NG/L
TROPONIN T SERPL HS-MCNC: 83 NG/L
TSH SERPL DL<=0.05 MIU/L-ACNC: 0.38 UIU/ML (ref 0.27–4.2)

## 2023-12-13 PROCEDURE — 25010000002 CEFTRIAXONE PER 250 MG: Performed by: FAMILY MEDICINE

## 2023-12-13 PROCEDURE — 25010000002 ENOXAPARIN PER 10 MG: Performed by: FAMILY MEDICINE

## 2023-12-13 PROCEDURE — 87449 NOS EACH ORGANISM AG IA: CPT | Performed by: FAMILY MEDICINE

## 2023-12-13 PROCEDURE — 92610 EVALUATE SWALLOWING FUNCTION: CPT

## 2023-12-13 PROCEDURE — 84443 ASSAY THYROID STIM HORMONE: CPT | Performed by: FAMILY MEDICINE

## 2023-12-13 PROCEDURE — 97161 PT EVAL LOW COMPLEX 20 MIN: CPT

## 2023-12-13 PROCEDURE — 94799 UNLISTED PULMONARY SVC/PX: CPT

## 2023-12-13 PROCEDURE — 25010000002 FUROSEMIDE PER 20 MG: Performed by: INTERNAL MEDICINE

## 2023-12-13 PROCEDURE — 93005 ELECTROCARDIOGRAM TRACING: CPT

## 2023-12-13 PROCEDURE — 99233 SBSQ HOSP IP/OBS HIGH 50: CPT | Performed by: FAMILY MEDICINE

## 2023-12-13 PROCEDURE — 99233 SBSQ HOSP IP/OBS HIGH 50: CPT | Performed by: INTERNAL MEDICINE

## 2023-12-13 PROCEDURE — 87899 AGENT NOS ASSAY W/OPTIC: CPT | Performed by: FAMILY MEDICINE

## 2023-12-13 PROCEDURE — 84436 ASSAY OF TOTAL THYROXINE: CPT | Performed by: FAMILY MEDICINE

## 2023-12-13 PROCEDURE — 25010000002 AZITHROMYCIN PER 500 MG: Performed by: NURSE PRACTITIONER

## 2023-12-13 PROCEDURE — 84479 ASSAY OF THYROID (T3 OR T4): CPT | Performed by: FAMILY MEDICINE

## 2023-12-13 PROCEDURE — 84484 ASSAY OF TROPONIN QUANT: CPT | Performed by: FAMILY MEDICINE

## 2023-12-13 PROCEDURE — 84145 PROCALCITONIN (PCT): CPT | Performed by: NURSE PRACTITIONER

## 2023-12-13 PROCEDURE — 25810000003 SODIUM CHLORIDE 0.9 % SOLUTION: Performed by: NURSE PRACTITIONER

## 2023-12-13 RX ORDER — METOPROLOL SUCCINATE 25 MG/1
12.5 TABLET, EXTENDED RELEASE ORAL EVERY 12 HOURS SCHEDULED
Status: DISCONTINUED | OUTPATIENT
Start: 2023-12-13 | End: 2023-12-16 | Stop reason: HOSPADM

## 2023-12-13 RX ORDER — FUROSEMIDE 10 MG/ML
40 INJECTION INTRAMUSCULAR; INTRAVENOUS EVERY 12 HOURS
Status: DISCONTINUED | OUTPATIENT
Start: 2023-12-13 | End: 2023-12-16 | Stop reason: HOSPADM

## 2023-12-13 RX ORDER — AZITHROMYCIN 250 MG/1
500 TABLET, FILM COATED ORAL EVERY 24 HOURS
Status: CANCELLED | OUTPATIENT
Start: 2023-12-13 | End: 2023-12-15

## 2023-12-13 RX ADMIN — FUROSEMIDE 40 MG: 10 INJECTION, SOLUTION INTRAMUSCULAR; INTRAVENOUS at 08:44

## 2023-12-13 RX ADMIN — HYDROCORTISONE 1 APPLICATION: 1 OINTMENT TOPICAL at 11:35

## 2023-12-13 RX ADMIN — NYSTATIN: 100000 POWDER TOPICAL at 20:55

## 2023-12-13 RX ADMIN — METOPROLOL SUCCINATE 12.5 MG: 25 TABLET, EXTENDED RELEASE ORAL at 20:54

## 2023-12-13 RX ADMIN — HYDROXYCHLOROQUINE SULFATE 200 MG: 200 TABLET ORAL at 08:44

## 2023-12-13 RX ADMIN — ENOXAPARIN SODIUM 40 MG: 100 INJECTION SUBCUTANEOUS at 08:44

## 2023-12-13 RX ADMIN — GUAIFENESIN 300 MG: 200 SOLUTION ORAL at 18:01

## 2023-12-13 RX ADMIN — GABAPENTIN 800 MG: 400 CAPSULE ORAL at 14:37

## 2023-12-13 RX ADMIN — NYSTATIN: 100000 POWDER TOPICAL at 08:45

## 2023-12-13 RX ADMIN — ROPINIROLE HYDROCHLORIDE 0.5 MG: 0.25 TABLET, FILM COATED ORAL at 20:54

## 2023-12-13 RX ADMIN — Medication 10 ML: at 08:45

## 2023-12-13 RX ADMIN — LEVOTHYROXINE SODIUM 125 MCG: 125 TABLET ORAL at 06:02

## 2023-12-13 RX ADMIN — GABAPENTIN 800 MG: 400 CAPSULE ORAL at 23:45

## 2023-12-13 RX ADMIN — GUAIFENESIN 300 MG: 200 SOLUTION ORAL at 11:35

## 2023-12-13 RX ADMIN — AZITHROMYCIN 500 MG: 500 INJECTION, POWDER, LYOPHILIZED, FOR SOLUTION INTRAVENOUS at 18:01

## 2023-12-13 RX ADMIN — GUAIFENESIN 300 MG: 200 SOLUTION ORAL at 23:45

## 2023-12-13 RX ADMIN — Medication 10 ML: at 20:55

## 2023-12-13 RX ADMIN — GUAIFENESIN 300 MG: 200 SOLUTION ORAL at 06:02

## 2023-12-13 RX ADMIN — GABAPENTIN 800 MG: 400 CAPSULE ORAL at 06:02

## 2023-12-13 RX ADMIN — CEFTRIAXONE SODIUM 1000 MG: 1 INJECTION, SOLUTION INTRAVENOUS at 20:54

## 2023-12-13 RX ADMIN — FUROSEMIDE 40 MG: 10 INJECTION, SOLUTION INTRAMUSCULAR; INTRAVENOUS at 20:54

## 2023-12-13 NOTE — CONSULTS
Pulmonary / Critical Care Consult Note      Patient Name: Marylou Levin  : 1940  MRN: [id number]  Primary Care Physician:  Brady Rae MD  Referring Physician: No ref. provider found  Date of admission: 2023    Subjective   Subjective     Reason for Consult/ Chief Complaint: Hypercapnia    HPI:  Marylou Levin is a 83 y.o. female with past medical history of SLE, atrial fib, debility, dementia and GERD who presented to the emergency department from a nursing home where she resides with increased weakness, shortness of breath and persistent cough.  In the emergency department, labs revealed mildly elevated troponin with proBNP at 1600.  COVID flu and RSV were negative.  CTA of chest revealed no PE but did show bibasilar airspace opacity with mild cardiomegaly.  Patient was then admitted to hospitalist service for acute respiratory failure with hypoxia and pneumonia.  Pulmonology was then consulted for the above reasons.  Upon exam, patient was noted to be resting comfortably in bed on 2 L nasal cannula.  Patient does have advanced dementia and family was not available at bedside.  History was obtained from medical record.  Per medical record, patient has had increasing weakness, shortness of breath and cough over the last 2 months.  Patient initially was a 1 person assist but due to decline in mobility has become bedridden.  Patient has had worsening shortness of breath when lying flat and reports productive cough with intermittent chills.  Per emergency room report family advised patient had recently been on 2 separate rounds of antibiotics for pneumonia without improvement.    Review of Systems  Unable to obtain due to advanced dementia    Personal History     Past Medical History:   Diagnosis Date    Atrial fibrillation     Difficulty walking     Hypokalemia     Hypothyroidism     Idiopathic progressive neuropathy     Muscle weakness     Systemic lupus        History reviewed. No  pertinent surgical history.    Family History: Unknown family history of chronic lung disease.     Social History:  reports that she has never smoked. She has never used smokeless tobacco. She reports that she does not drink alcohol and does not use drugs.    Home Medications:  Acetaminophen-DM, Baclofen, HYDROcodone-acetaminophen, chlorthalidone, gabapentin, hydroxychloroquine, levothyroxine, nystatin, ondansetron, potassium chloride, rOPINIRole, raloxifene, and senna    Allergies:  Allergies   Allergen Reactions    Penicillins Unknown - High Severity       Objective    Objective     Vitals:   Temp:  [97.5 °F (36.4 °C)-98.1 °F (36.7 °C)] 98.1 °F (36.7 °C)  Heart Rate:  [] 90  Resp:  [16-22] 16  BP: (103-147)/(49-99) 107/49  Flow (L/min):  [2] 2    Physical Exam:  Vital Signs Reviewed   General: Pleasant, elderly female, alert, NAD, lying in bed.    HEENT:  PERRL, EOMI.  OP, nares clear, no sinus tenderness  Neck:  Supple, no JVD, no thyromegaly  Lymph: no axillary, cervical, supraclavicular lymphadenopathy noted bilaterally  Chest:  good aeration, rhonchi to auscultation bilaterally, tympanic to percussion bilaterally, no work of breathing noted on 2 L nasal cannula  CV: RRR, no MGR, pulses 2+, equal.  Abd:  Soft, NT, ND, + BS, no HSM  EXT:  no clubbing, no cyanosis, no edema, no joint tenderness  Neuro:  A&Ox1, CN grossly intact, no focal deficits.  Skin: No rashes or lesions noted      Result Review    Result Review:  I have personally reviewed the results from the time of this admission to 12/13/2023 07:30 EST and agree with these findings:  [x]  Laboratory  [x]  Microbiology  [x]  Radiology  [x]  EKG/Telemetry   [x]  Cardiology/Vascular   []  Pathology  []  Old records  []  Other:  Most notable findings include:     ABG 7.38/68/83  Mycoplasma IgM positive        Lab 12/12/23  1520 12/12/23  1438 12/06/23  1738   WBC  --  7.85 9.36   HEMOGLOBIN  --  12.7 11.8*   HEMATOCRIT  --  42.0 36.1   PLATELETS  --   150 104*   SODIUM 133*  --  130*   POTASSIUM 3.8  --  3.8   CHLORIDE 89*  --  86*   CO2 36.4*  --  37.0*   BUN 7*  --  7*   CREATININE 0.49*  --  0.58   GLUCOSE 96  --  88   CALCIUM 8.6  --  8.2*   TOTAL PROTEIN 5.2*  --  4.6*   ALBUMIN 2.7*  --  2.8*   GLOBULIN 2.5  --  1.8     CT Chest With Contrast Diagnostic    Result Date: 12/12/2023  PROCEDURE: CT CHEST W CONTRAST DIAGNOSTIC  COMPARISON:  [facility], CR, XR CHEST 1 VW, 12/12/2023, 15:09. INDICATIONS: Short of air, cough  TECHNIQUE: After obtaining the patient's consent, CT images were obtained with non-ionic intravenous contrast material.   PROTOCOL:   Standard imaging protocol performed    RADIATION:   DLP: 617.7mGy*cm   Automated exposure control was utilized to minimize radiation dose. CONTRAST: 100cc Isovue 370 I.V.  FINDINGS:  Pulmonary arteries: there is excellent opacification of the pulmonary arteries.  No intraluminal filling defects are seen.  There is limited evaluation of the subsegmental lower lobe pulmonary arteries due to consolidation.  Main, right left pulmonary artery are upper limits normal in size.  There is cardiomegaly.  There are no pathologically enlarged hilar or mediastinal lymph nodes.  No significant coronary artery atherosclerotic calcification.  Thoracic aorta has normal caliber.  No pericardial effusion.  There are1 small bilateral pleural effusions.  There is bilateral lower lobe consolidation with volume loss.  Atelectasis versus pneumonia.  There is linear atelectasis in the anterior inferior right upper lobe.  There are no endobronchial lesions.  There are no suspicious pulmonary nodules.  Upper abdomen is unremarkable.  Soft tissues are unremarkable.  There are no aggressive focal lytic or sclerotic osseous lesions.  There is osteopenia.        Impression:   1. No findings of pulmonary embolus.  Limited evaluation of the lower lobe subsegmental pulmonary arteries due to volume loss and pleural effusions and  consolidation. 2. Bibasilar airspace opacities atelectasis versus pneumonia with small bilateral pleural effusions.  Recommend clinical correlation. 3. Mild cardiomegaly.     JUANA HURTADO MD       Electronically Signed and Approved By: JUANA HURTADO MD on 12/12/2023 at 17:04              Assessment & Plan   Assessment / Plan     Active Hospital Problems:  Active Hospital Problems    Diagnosis     **Dyspnea     Acute-on-chronic respiratory failure     Pneumonia due to infectious organism     Hypothyroidism (acquired)     History of dementia      Impression:  Acute on chronic respiratory failure, 2 L baseline  Chronic hypercapnic respiratory failure  Mycoplasma pneumonia  Dementia  CKD stage IIIa  Hypertension  History of SLE  History A-fib/flutter, not on anticoagulation      Plan:  -Continue O2 to maintain SpO2 greater than 90%.  Baseline 2 L per nasal cannula.  Has chronic baseline hypercapnia based on ABG, will consider starting bipap 12/6 to continue chronically if she is able to tolerate.   -CT chest reviewed revealing bibasilar airspace opacities with small bilateral pleural effusions and mild cardiomegaly.  No pulmonary embolus noted.  -Continue ceftriaxone and azithromycin for pneumonia  -Mycoplasma IgM positive  -Sputum culture pending  -COVID, flu, RSV, Legionella and strep pneumonia negative  -Check procalcitonin --> 0.04  -Start bronchopulmonary hygiene  -Encourage use of I-S and flutter valve  -Continue as needed DuoNebs  -Continue Lasix 40 mg IV twice daily  -Trend electrolytes and renal panel.  Replace electrolytes as needed.  -Echocardiogram pending  -SLP on board.  Appreciate assistance.    Labs, microbiology, radiology, medications, and provider notes personally reviewed.  Discussed with primary services and bedside RN.     Thank you for this consult and allowing me to participate in the care of Ms. Levin      Electronically signed by David Jduge MD, 12/13/2023, 07:30 EST.  Electronically signed  by PATRICK Burgos, 12/13/23, 4:47 PM EST.      This visit was performed by BOTH a physician and an APC. I personally evaluated and examined the patient. I performed all aspects of MDM as documented. , I have reviewed and confirmed the accuracy of the patient's history as documented in this note., and I have reexamined the patient and the results are consistent with the previously documented exam. I have updated the documentation as necessary.     Electronically signed by David Judge MD, 12/13/23, 5:49 PM EST.

## 2023-12-13 NOTE — PLAN OF CARE
Goal Outcome Evaluation:  Plan of Care Reviewed With: patient, son         ASSESSMENT/ PLAN OF CARE:  Pt presents with limitations, noted below, that impede her ability to swallow safely and maintain nutrition. The skills of a therapist will be required to safely and effectively implement the following treatment plan to restore maximal level of function.    PROBLEMS:  1.   Swallow delay, risk of aspiration                                             TREATMENT: Dysphagia therapy to address swallow function through exercises and education of strategies.     FREQUENCY/DURATION: Twice daily 5 times per week    REHAB POTENTIAL:  Pt has fair to good rehab potential.  The following limitations may influence improvement/ length of tx: Medical status.    RECOMMENDATIONS:   1.   DIET: Mechanical soft solids with ground meats and gravies, thin liquid.    2.  POSITION: Positioning fully upright for all p.o. intake and 30 minutes following.    3.  COMPENSATORY STRATEGIES: Alternate small bites and small sips of solids and liquids at a slow rate.           Anticipated Discharge Disposition (SLP): extended care facility

## 2023-12-13 NOTE — PLAN OF CARE
Goal Outcome Evaluation:            Patient admitted with CAP from [facility], on 2L NC at baseline, lungs diminished. Patient has been bed bound for 2 months, confused at baseline and incontinent of bowel and bladder. Admitted with sinus arrythmia, now in a flutter/a fib.

## 2023-12-13 NOTE — PROGRESS NOTES
[facility]   Hospitalist Progress Note  Date: 2023  Patient Name: Marylou Levin  : 1940  MRN: [id number]  Date of admission: 2023      Subjective   Subjective     Chief complaint: Shortness of breath    Summary:  83-year-old female hospitalized on 2023 with shortness of breath symptoms, has history of SLE, permanent atrial fibrillation, not on anticoagulation, hypothyroidism, debility, dementia, GERD without esophagitis, increased work of breathing, cough, bibasilar opacities on imaging, with hypercapnia, placed on supplemental oxygen, troponins elevated but with no signs of cardiac symptoms or pain.  EKG without ischemic changes, is having issues with a flutter, A-fib.  Known history of such.  Pulmonary consulted with ongoing issues with breathing.    Interval follow-up: Seen and examined this morning, no acute distress, no acute major night events, heart rate bouncing around, going into a flutter, A-fib, rates been variable, reaching up to the 140s at times, holding around 100, started on beta-blocker.  As needed metoprolol ordered as well.  Patient's blood pressures are adequate, on 2 L for sats in the 80s to 90s, ABG shows pCO2 68, pulmonary consulted.  Troponin trended up, suspect this is demand ischemia related with underlying breathing issues.  Denies chest pain or palpitations.  No fevers, chills, sweats.  Positive mycoplasma IgM.    Review of systems:  All systems reviewed negative except for generalized weakness, generalized fatigue, shortness of breath, cough      Objective   Objective     Vitals:   Temp:  [97.6 °F (36.4 °C)-98.1 °F (36.7 °C)] 97.8 °F (36.6 °C)  Heart Rate:  [] 109  Resp:  [16-18] 18  BP: (103-147)/(49-99) 141/66  Flow (L/min):  [2] 2  Physical Exam    Constitutional: Awake, alert, no acute distress wearing nasal cannula oxygen   Eyes: Pupils equal, sclerae anicteric, no conjunctival injection   HENT: NCAT, mucous membranes moist   Neck: Supple, no  "thyromegaly, no lymphadenopathy, trachea midline   Respiratory: Diminished breath sounds with wheezing and rhonchi   Cardiovascular: Irregular rate, regular rhythm, 2 out of 6 systolic murmurs, rubs, or gallops, palpable pedal pulses bilaterally   Gastrointestinal: Positive bowel sounds, soft, nontender, nondistended   Musculoskeletal: No bilateral ankle edema, no clubbing or cyanosis to extremities   Psychiatric: Appropriate affect, cooperative   Neurologic: Oriented x 3, strength symmetric in all extremities, Cranial Nerves grossly intact to confrontation, speech clear   Skin: No rashes visible on exposed skin      Result Review    Result Review:  I have personally reviewed the pertinent results from the past 24 hours to 12/13/2023 16:34 EST and agree with these findings:  [x]  Laboratory   CBC          10/18/2023    18:45 12/6/2023    17:38 12/12/2023    14:38   CBC   WBC 7.58  9.36  7.85    RBC 4.55  4.02  4.26    Hemoglobin 13.5  11.8  12.7    Hematocrit 43.7  36.1  42.0    MCV 96.0  89.8  98.6    MCH 29.7  29.4  29.8    MCHC 30.9  32.7  30.2    RDW 12.7  11.8  12.8    Platelets 195  104  150      BMP          10/18/2023    20:38 12/6/2023    17:38 12/12/2023    15:20   BMP   BUN 14  7  7    Creatinine 0.84  0.58  0.49    Sodium 134  130  133    Potassium 3.9  3.8  3.8    Chloride 92  86  89    CO2 36.2  37.0  36.4    Calcium 9.3  8.2  8.6      LIVER FUNCTION TESTS:      Lab 12/12/23  1520 12/06/23  1738   TOTAL PROTEIN 5.2* 4.6*   ALBUMIN 2.7* 2.8*   GLOBULIN 2.5 1.8   ALT (SGPT) 6 9   AST (SGOT) 13 14   BILIRUBIN 0.4 0.4   ALK PHOS 57 52       [x]  Microbiology No results found for: \"ACANTHNAEG\", \"AFBCX\", \"BPERTUSSISCX\", \"BLOODCX\"  No results found for: \"BCIDPCR\", \"CXREFLEX\", \"CSFCX\", \"CULTURETIS\"  No results found for: \"CULTURES\", \"HSVCX\", \"URCX\"  No results found for: \"EYECULTURE\", \"GCCX\", \"HSVCULTURE\", \"LABHSV\"  No results found for: \"LEGIONELLA\", \"MRSACX\", \"MUMPSCX\", \"MYCOPLASCX\"  No results found for: " "\"NOCARDIACX\", \"STOOLCX\"  No results found for: \"THROATCX\", \"UNSTIMCULT\", \"URINECX\", \"CULTURE\", \"VZVCULTUR\"  No results found for: \"VIRALCULTU\", \"WOUNDCX\"    [x]  Radiology CT Chest With Contrast Diagnostic    Result Date: 12/12/2023  PROCEDURE: CT CHEST W CONTRAST DIAGNOSTIC  COMPARISON:  [facility], CR, XR CHEST 1 VW, 12/12/2023, 15:09. INDICATIONS: Short of air, cough  TECHNIQUE: After obtaining the patient's consent, CT images were obtained with non-ionic intravenous contrast material.   PROTOCOL:   Standard imaging protocol performed    RADIATION:   DLP: 617.7mGy*cm   Automated exposure control was utilized to minimize radiation dose. CONTRAST: 100cc Isovue 370 I.V.  FINDINGS:  Pulmonary arteries: there is excellent opacification of the pulmonary arteries.  No intraluminal filling defects are seen.  There is limited evaluation of the subsegmental lower lobe pulmonary arteries due to consolidation.  Main, right left pulmonary artery are upper limits normal in size.  There is cardiomegaly.  There are no pathologically enlarged hilar or mediastinal lymph nodes.  No significant coronary artery atherosclerotic calcification.  Thoracic aorta has normal caliber.  No pericardial effusion.  There are1 small bilateral pleural effusions.  There is bilateral lower lobe consolidation with volume loss.  Atelectasis versus pneumonia.  There is linear atelectasis in the anterior inferior right upper lobe.  There are no endobronchial lesions.  There are no suspicious pulmonary nodules.  Upper abdomen is unremarkable.  Soft tissues are unremarkable.  There are no aggressive focal lytic or sclerotic osseous lesions.  There is osteopenia.          1. No findings of pulmonary embolus.  Limited evaluation of the lower lobe subsegmental pulmonary arteries due to volume loss and pleural effusions and consolidation. 2. Bibasilar airspace opacities atelectasis versus pneumonia with small bilateral pleural effusions.  " Recommend clinical correlation. 3. Mild cardiomegaly.     JUANA HURTADO MD       Electronically Signed and Approved By: JUANA HURTADO MD on 12/12/2023 at 17:04             XR Chest 1 View    Result Date: 12/12/2023  PROCEDURE: XR CHEST 1 VW  COMPARISON: [facility], CR, CHEST AP/PA 1 VIEW, 7/13/2019, 12:31.  INDICATIONS: cough congestion  FINDINGS:  The depth of inspiration is poor.  There are atelectatic changes in the right upper and left basilar area.  There are no large pleural effusions.        1. Poor inspiration. 2. Atelectatic changes noted involving the lungs.  Whether in part some of the findings may be secondary to underlying multi focal pneumonia is uncertain.       KAYDEN VILLASEÑOR MD       Electronically Signed and Approved By: KAYDEN VILLASEÑOR MD on 12/12/2023 at 15:11               [x]  EKG/Telemetry         []  Cardiology/Vascular   []  Pathology  [x]  Old records  []  Other:    Assessment & Plan   Assessment / Plan     Assessment/Plan:  Assessment:  Acute hypoxemic respiratory failure with hypercapnia  Multifocal pneumonia due to bacterial organism; mycoplasma  Hypothyroidism  Uncontrolled atrial fibrillation/flutter  Not on long-term anticoagulation  Dementia  CKD stage IIIa  Essential hypertension  Renal osteodystrophy  GERD without esophagitis  History of hyperuricemia  Debility  SLE  Elevated troponins without concern for cardiac event    Plan:  Labs and imaging reviewed  Continue azithromycin 500 mg daily for 3 days total  Continue ceftriaxone 1 g daily for next 24 hours until we have negative culture results  Consulted pulmonary discussed with Dr. Judge, recommendations appreciated  Start metoprolol 12.5 mg twice a day p.o.  Utilize metoprolol IV 2.5 mg q. 5 minutes x 3 doses total if heart rate is greater than 120  If goes into rapid ventricular response, consider starting Cardizem continuous infusion  Follow-up echocardiogram  Will check 1 more troponin  Check TSH and free T4  Holding all  other home medications until they are required to be resumed  PT/OT  Follow-up culture results  A.m. labs  Full code  DVT prophylaxis with Lovenox; though patient will need anticoagulation for atrial fibrillation if she can tolerate anticoagulation  Clinical course will dictate further management  Discussed with nurse at the bedside    DVT prophylaxis:  Medical DVT prophylaxis orders are present.    CODE STATUS:   Level Of Support Discussed With: Patient  Code Status (Patient has no pulse and is not breathing): CPR (Attempt to Resuscitate)  Medical Interventions (Patient has pulse or is breathing): Full Support        Electronically signed by Annmarie Patricio MD, 12/13/23, 4:34 PM EST.    Portions of this documentation were transcribed electronically from a voice recognition software.  I confirm all data accurately represents the service(s) I performed at today's visit.

## 2023-12-13 NOTE — THERAPY EVALUATION
Acute Care - Physical Therapy Initial Evaluation   [facility]     Patient Name: Marylou Levin  : 1940  MRN: [id number]  Today's Date: 2023      Visit Dx:     ICD-10-CM ICD-9-CM   1. Acute respiratory failure with hypoxia  J96.01 518.81   2. Shortness of breath  R06.02 786.05   3. Pneumonia due to infectious organism, unspecified laterality, unspecified part of lung  J18.9 486   4. Oropharyngeal dysphagia  R13.12 787.22   5. Difficulty walking  R26.2 719.7     Patient Active Problem List   Diagnosis    Dyspnea    Acute-on-chronic respiratory failure    Pneumonia due to infectious organism    Hypothyroidism (acquired)    History of dementia     Past Medical History:   Diagnosis Date    Atrial fibrillation     Difficulty walking     Hypokalemia     Hypothyroidism     Idiopathic progressive neuropathy     Muscle weakness     Systemic lupus      History reviewed. No pertinent surgical history.  PT Assessment (last 12 hours)       PT Evaluation and Treatment       Row Name 23 1400          Physical Therapy Time and Intention    Subjective Information no complaints  -DP     Document Type evaluation  -DP     Mode of Treatment individual therapy;physical therapy  -DP     Patient Effort fair  -DP       Row Name 23 1400          General Information    Patient Profile Reviewed yes  -DP     Patient Observations poorly cooperative;alert  -DP     Prior Level of Function max assist:;bed mobility;ADL's;transfer  Pt reports being non ambulatory at baseline  -DP     Barriers to Rehab previous functional deficit  -DP       Row Name 23 1400          Living Environment    Current Living Arrangements extended care facility  -DP     People in Home facility resident  -DP       Row Name 23 1400          Range of Motion (ROM)    Range of Motion bilateral lower extremities;ROM is WFL  -DP       Row Name 23 1400          Strength (Manual Muscle Testing)    Strength (Manual Muscle Testing) bilateral  lower extremities  3-/5  -DP       Row Name 12/13/23 1400          Bed Mobility    Bed Mobility supine-sit-supine  -DP     Supine-Sit-Supine Jay (Bed Mobility) moderate assist (50% patient effort)  -DP       Row Name 12/13/23 1400          Transfers    Comment, (Transfers) deferred  -DP       Row Name 12/13/23 1400          Gait/Stairs (Locomotion)    Comment, (Gait/Stairs) Pt is non ambulatory at baseline  -DP       Row Name             Wound 12/13/23 0417 labia    Wound - Properties Group Placement Date: 12/13/23  -[facility] Placement Time: 0417  -[facility] Present on Original Admission: Y  -[facility] Location: labia  -[facility]    Retired Wound - Properties Group Placement Date: 12/13/23  -[facility] Placement Time: 0417  -[facility] Present on Original Admission: Y  -[facility] Location: labia  -[facility]    Retired Wound - Properties Group Date first assessed: 12/13/23  -[facility] Time first assessed: 0417  -[facility] Present on Original Admission: Y  -[facility] Location: labia  -[facility]      Row Name             Wound 12/13/23 0419 Right anterior plantar    Wound - Properties Group Placement Date: 12/13/23  -[facility] Placement Time: 0419  -[facility] Present on Original Admission: Y  -[facility] Side: Right  -[facility] Orientation: anterior  -[facility] Location: plantar  -[facility]    Retired Wound - Properties Group Placement Date: 12/13/23  -[facility] Placement Time: 0419  -[facility] Present on Original Admission: Y  -[facility] Side: Right  -[facility] Orientation: anterior  -[facility] Location: plantar  -[facility]    Retired Wound - Properties Group Date first assessed: 12/13/23  -[facility] Time first assessed: 0419  -[facility] Present on Original Admission: Y  -[facility] Side: Right  -[facility] Location: plantar  -[facility]      Row Name 12/13/23 1400          Plan of Care Review    Plan of Care Reviewed With patient  -DP     Outcome Evaluation Patient is a long-term care resident at a local nursing home.  He is nonambulatory at baseline and dependent on staff for all bed mobilities, transfers, and ADLs.  He is not appropriate for inpatient PT services.  He will benefit from a PT consult upon return  to the nursing home to maximize independence with functional mobility.  -DP       Row Name 12/13/23 1400          Positioning and Restraints    Pre-Treatment Position in bed  -DP     Post Treatment Position bed  -DP     In Bed call light within reach;encouraged to call for assist;exit alarm on  -DP       Row Name 12/13/23 1400          Therapy Assessment/Plan (PT)    Criteria for Skilled Interventions Met (PT) does not meet criteria for skilled intervention  -DP     Therapy Frequency (PT) evaluation only  -DP       Row Name 12/13/23 1400          PT Evaluation Complexity    History, PT Evaluation Complexity no personal factors and/or comorbidities  -DP     Examination of Body Systems (PT Eval Complexity) total of 4 or more elements  -DP     Clinical Presentation (PT Evaluation Complexity) stable  -DP     Clinical Decision Making (PT Evaluation Complexity) low complexity  -DP     Overall Complexity (PT Evaluation Complexity) low complexity  -DP       Row Name 12/13/23 1400          Physical Therapy Goals    Problem Specific Goal Selection (PT) problem specific goal 1, PT  -DP       Row Name 12/13/23 1400          Problem Specific Goal 1 (PT)    Problem Specific Goal 1 (PT) Complete PT evaluation  -DP     Time Frame (Problem Specific Goal 1, PT) 1 day  -DP     Progress/Outcome (Problem Specific Goal 1, PT) goal met  -DP               User Key  (r) = Recorded By, (t) = Taken By, (c) = Cosigned By      Initials Name Provider Type    Brandon Cantrell, PT Physical Therapist    Nevin Swanson RN Registered Nurse                      PT Recommendation and Plan  Anticipated Discharge Disposition (PT): extended care facility  Therapy Frequency (PT): evaluation only  Plan of Care Reviewed With: patient  Outcome Evaluation: Patient is a long-term care resident at a local nursing home.  He is nonambulatory at baseline and dependent on staff for all bed mobilities, transfers, and ADLs.  He is not appropriate for inpatient  PT services.  He will benefit from a PT consult upon return to the nursing home to maximize independence with functional mobility.   Outcome Measures       Row Name 12/13/23 1400             How much help from another person do you currently need...    Turning from your back to your side while in flat bed without using bedrails? 2  -DP      Moving from lying on back to sitting on the side of a flat bed without bedrails? 1  -DP      Moving to and from a bed to a chair (including a wheelchair)? 1  -DP      Standing up from a chair using your arms (e.g., wheelchair, bedside chair)? 1  -DP      Climbing 3-5 steps with a railing? 1  -DP      To walk in hospital room? 1  -DP      AM-PAC 6 Clicks Score (PT) 7  -DP      Highest Level of Mobility Goal 2 --> Bed activities/dependent transfer  -DP         Functional Assessment    Outcome Measure Options AM-PAC 6 Clicks Basic Mobility (PT)  -DP                User Key  (r) = Recorded By, (t) = Taken By, (c) = Cosigned By      Initials Name Provider Type    Brandon Cantrell, PT Physical Therapist                     Time Calculation:    PT Charges       Row Name 12/13/23 1440             Time Calculation    PT Received On 12/13/23  -DP         Untimed Charges    PT Eval/Re-eval Minutes 30  -DP         Total Minutes    Untimed Charges Total Minutes 30  -DP       Total Minutes 30  -DP                User Key  (r) = Recorded By, (t) = Taken By, (c) = Cosigned By      Initials Name Provider Type    Brandon Cantrell, PT Physical Therapist                      PT G-Codes  Outcome Measure Options: AM-PAC 6 Clicks Basic Mobility (PT)  AM-PAC 6 Clicks Score (PT): 7    Brandon Tillman, PT  12/13/2023

## 2023-12-13 NOTE — PROGRESS NOTES
Respiratory Therapist Broncho-Pulmonary Hygiene Progress Note      Patient Name:  Marylou Levin  YOB: 1940    Marylou Levin meets the qualification for Level 2 of the Bronco-Pulmonary Hygiene Protocol. This was based on my daily patient assessment and includes review of chest x-ray results, cough ability and quality, oxygenation, secretions or risk for secretion development and patient mobility.     Broncho-Pulmonary Hygiene Assessment:    Level of Movement: Actively changing positions without assistance  Alert/ oriented/ cooperative    Breath Sounds: Diminished and/or coarse rhonchi    Cough: Ineffective, weak, frequent    Chest X-Ray: Possible signs of consolidation and/or atelectasis or clear.     Sputum Productions: None or small amount of thin or watery secretions with effective cough    History and Physical: Chronic condition    SpO2 to Oxygen Need: greater than 92% on room air or  less than 3L nasal canula    Current SpO2 is: 99% on 2L    Based on this information, I have completed the following interventions: Aerobika with bronchodialtor medication or TID      Electronically signed by Lubna Rey RRT, 12/13/23, 8:17 AM EST.

## 2023-12-13 NOTE — CONSULTS
"Nutrition Services    Patient Name: Marylou Levin  YOB: 1940  MRN: [id number]  Admission date: 12/12/2023      CLINICAL NUTRITION ASSESSMENT      Reason for Assessment  MST Score 2+     H&P:  Past Medical History:   Diagnosis Date    Atrial fibrillation     Difficulty walking     Hypokalemia     Hypothyroidism     Idiopathic progressive neuropathy     Muscle weakness     Systemic lupus         Current Problems:   Active Hospital Problems    Diagnosis     **Dyspnea     Acute-on-chronic respiratory failure     Pneumonia due to infectious organism     Hypothyroidism (acquired)     History of dementia         Nutrition/Diet History         Narrative     Patient assessed by RD for LOS. Patient is at low risk per nutrition risk screening (NRS-2002).     No acute nutrition concerns or interventions at this time.      Anthropometrics        Current Height, Weight Height: 162.6 cm (64.02\")  Weight: 73.7 kg (162 lb 7.7 oz)   Current BMI Body mass index is 27.88 kg/m².   BMI Classification Overweight   % %   Adjusted Body Weight (ABW) 62 kg   Weight Hx  Wt Readings from Last 30 Encounters:   12/13/23 0500 73.7 kg (162 lb 7.7 oz)   12/12/23 2059 73.7 kg (162 lb 7.7 oz)   12/12/23 1423 74.6 kg (164 lb 7.4 oz)   09/12/23 1422 79 kg (174 lb 2.6 oz)            Wt Change Observation -6.7% x 3 months, not clinically significant     Estimated/Assessed Needs  Estimated Needs based on: Adjusted Body Weight 62kg       Energy Requirements 25-30 kcal/kg   EST Needs (kcal/day) 9186-2939 kcal       Protein Requirements 1.0 g/kg   EST Daily Needs (g/day) 62 g       Fluid Requirements 25-30 ml/kg    Estimated Needs (mL/day) 5993-9406 ml     Labs/Medications         Pertinent Labs Reviewed.   Results from last 7 days   Lab Units 12/12/23  1520 12/06/23  1738   SODIUM mmol/L 133* 130*   POTASSIUM mmol/L 3.8 3.8   CHLORIDE mmol/L 89* 86*   CO2 mmol/L 36.4* 37.0*   BUN mg/dL 7* 7*   CREATININE mg/dL 0.49* 0.58   CALCIUM " "mg/dL 8.6 8.2*   BILIRUBIN mg/dL 0.4 0.4   ALK PHOS U/L 57 52   ALT (SGPT) U/L 6 9   AST (SGOT) U/L 13 14   GLUCOSE mg/dL 96 88     Results from last 7 days   Lab Units 12/12/23  1438   HEMOGLOBIN g/dL 12.7   HEMATOCRIT % 42.0     COVID19   Date Value Ref Range Status   12/12/2023 Not Detected Not Detected - Ref. Range Final     No results found for: \"HGBA1C\"      Pertinent Medications Reviewed.     Malnutrition Severity Assessment              Nutrition Diagnosis         Nutrition Dx Problem 1 No nutrition diagnosis at this time.       Nutrition Intervention          Current Nutrition Orders & Evaluation of Intake     Current Nutrition Orders & Evaluation of Intake       Current PO Diet Diet: Cardiac Diets; Healthy Heart (2-3 Na+); Texture: Mechanical Ground (NDD 2); Fluid Consistency: Thin (IDDSI 0)   Supplement No active supplement orders       Nutrition Intervention/Prescription        No further nutrition intervention indicated.       Medical Nutrition Therapy/Nutrition Education          Learner     Readiness Patient  Education not indicated.      Method     Response N/A  N/A     Monitor/Evaluation        Monitor Per protocol     Nutrition Discharge Plan         No discharge nutrition needs identified.      Electronically signed by:  Shanice Jovel, KARLENE  12/13/23 15:10 EST    "

## 2023-12-13 NOTE — THERAPY EVALUATION
Acute Care - Speech Language Pathology   Swallow Initial Evaluation  [facility]     Patient Name: Marylou Levin  : 1940  MRN: [id number]  Today's Date: 2023               Admit Date: 2023    Visit Dx:     ICD-10-CM ICD-9-CM   1. Acute respiratory failure with hypoxia  J96.01 518.81   2. Shortness of breath  R06.02 786.05   3. Pneumonia due to infectious organism, unspecified laterality, unspecified part of lung  J18.9 486   4. Oropharyngeal dysphagia  R13.12 787.22     Patient Active Problem List   Diagnosis    Dyspnea    Acute-on-chronic respiratory failure    Pneumonia due to infectious organism    Hypothyroidism (acquired)    History of dementia     Past Medical History:   Diagnosis Date    Atrial fibrillation     Difficulty walking     Hypokalemia     Hypothyroidism     Idiopathic progressive neuropathy     Muscle weakness     Systemic lupus      History reviewed. No pertinent surgical history.        Inpatient Speech Pathology Dysphagia Evaluation        PAIN SCALE: None indicated.    PRECAUTIONS/CONTRAINDICATIONS: Standard    SUSPECTED ABUSE/NEGLECT/EXPLOITATION: None indicated.    SOCIAL/PSYCHOLOGICAL NEEDS/BARRIERS: None indicated.    PAST SOCIAL HISTORY: 83-year-old female nursing home resident    PRIOR FUNCTION: On mechanical soft diet    PATIENT GOALS/EXPECTATIONS: Continue eating orally    HISTORY: 83-year-old female the above diagnosis is referred for speech therapy evaluation assess for swallowing.  No previous speech pathology services are reported this incident.  Family reports patient had gotten choked a few months ago and has been on soft diet since.    CURRENT DIET LEVEL: N.p.o.    OBJECTIVE:    TEST ADMINISTERED: Clinical dysphagia evaluation    COGNITION/SAFETY AWARENESS: Patient followed basic directions with cueing.    BEHAVIORAL OBSERVATIONS: Alert and cooperative    ORAL MOTOR EXAM: Grossly within limits    VOICE QUALITY: Soft    REFLEX EXAM: Deferred    POSTURE:  Assisted sitting upright in bed    FEEDING/SWALLOWING FUNCTION: Assessed with nectar liquid, thin liquids, puréed solids, crunchy solid.    CLINICAL OBSERVATIONS: Nectar liquid  by cup with delay, vocal quality remaining clear to cervical auscultation.  Thin liquid by cup and by straw with delay, slight vocal change noted x 1.    Purée solid with swallow completed with laryngeal elevation noted to palpation.  Crunchy solid with adequate chewing followed by swallow completed clearing the oral cavity.    DYSPHAGIA CRITERIA: Swallow delay, risk of aspiration    FUNCTIONAL ASSESSMENT INSTRUMENT: Patient currently scored a level 5 of 7 on Functional Communication Measures for swallowing indicating a 20-39% limitation in function.    ASSESSMENT/ PLAN OF CARE:  Pt presents with limitations, noted below, that impede her ability to swallow safely and maintain nutrition. The skills of a therapist will be required to safely and effectively implement the following treatment plan to restore maximal level of function.    PROBLEMS:  1.   Swallow delay, risk of aspiration                       LTG 1: 30 days: Patient will tolerate least restrictive diet utilizing appropriate positioning and strategies with minimal assistance.                       STG 1a: 14 days: Patient will tolerate diet of mechanical soft solids and thin liquids with minimal assistance for strategies.                       STG 1b: 14 days: Patient will tolerate 8 of 10 trials of thin liquids with min to no signs or symptoms of aspiration.                       STG 1c: 14 days: Patient/family education.                       TREATMENT: Dysphagia therapy to address swallow function through exercises and education of strategies.     FREQUENCY/DURATION: Twice daily 5 times per week    REHAB POTENTIAL:  Pt has fair to good rehab potential.  The following limitations may influence improvement/ length of tx: Medical status.    RECOMMENDATIONS:   1.   DIET: Mechanical  soft solids with ground meats and gravies, thin liquid.    2.  POSITION: Positioning fully upright for all p.o. intake and 30 minutes following.    3.  COMPENSATORY STRATEGIES: Alternate small bites and small sips of solids and liquids at a slow rate.    Pt/responsible party agrees with plan of care and has been informed of all alternatives, risks and benefits.                              Anticipated Discharge Disposition (SLP): [facility] (12/13/23 1150)                                                                  EDUCATION  The patient has been educated in the following areas:   Modified Diet Instruction.              Time Calculation:    Time Calculation- SLP       Row Name 12/13/23 1150             Time Calculation- SLP    SLP Start Time 1050  -TB      SLP Stop Time 1150  -TB      SLP Time Calculation (min) 60 min  -TB      SLP Received On 12/13/23  -TB         Untimed Charges    SLP Eval/Re-eval  ST Eval Oral Pharyng Swallow - 78682  -TB      28610-KT Eval Oral Pharyng Swallow Minutes 60  -TB         Total Minutes    Untimed Charges Total Minutes 60  -TB       Total Minutes 60  -TB                User Key  (r) = Recorded By, (t) = Taken By, (c) = Cosigned By      Initials Name Provider Type    TB Chrissie Fonseca SLP Speech and Language Pathologist                    Therapy Charges for Today       Code Description Service Date Service Provider Modifiers Qty    04026502007 HC ST EVAL ORAL PHARYNG SWALLOW 4 12/13/2023 Chrissie Fonseca SLP GN 1                 EMMY Rabago  12/13/2023

## 2023-12-13 NOTE — PLAN OF CARE
Goal Outcome Evaluation:  Plan of Care Reviewed With: patient           Outcome Evaluation: Patient is a long-term care resident at a local nursing home.  He is nonambulatory at baseline and dependent on staff for all bed mobilities, transfers, and ADLs.  He is not appropriate for inpatient PT services.  He will benefit from a PT consult upon return to the nursing home to maximize independence with functional mobility.      Anticipated Discharge Disposition (PT): extended care facility

## 2023-12-13 NOTE — PLAN OF CARE
Problem: Adult Inpatient Plan of Care  Goal: Plan of Care Review  12/13/2023 1640 by Glendy Garza RN  Outcome: Ongoing, Progressing  Flowsheets (Taken 12/13/2023 1640)  Plan of Care Reviewed With: patient  Outcome Evaluation: Patient alert but confused throughout shift. Patient is on 2L NC. No observed nonverbal indicators of pain at this time. Skin care completed throughout shift. Wound consult ordered per protocol. Ontiveros placed per MD order. Care explained. BM on shift. Afib on monitor. Patient HR jumped to 130-150s, nonsustained, asymtomatic, MD notified. Continuing with plan of care.   Goal Outcome Evaluation:  Plan of Care Reviewed With: patient        Progress: no change  Outcome Evaluation: Patient alert but confused throughout shift. Patient is on 2L NC. No observed nonverbal indicators of pain at this time. Skin care completed throughout shift. Wound consult ordered per protocol. Ontiveros placed per MD order. Care explained. BM on shift. Afib on monitor. Patient HR jumped to 130-150s, nonsustained, asymptomatic, MD notified. Continuing with plan of care.

## 2023-12-13 NOTE — CONSULTS
12/13/23 1424   Coping/Psychosocial   Additional Documentation Spiritual Care (Group)   Spiritual Care   Spiritual Care Source  initiative   Spiritual Care Follow-Up follow-up planned regularly for general support   Response to Spiritual Care thanks expressed   Spiritual Care Interventions other (see comments)  (introductions made and my role explained.)   Spiritual Care Visit Type initial   Spiritual Care Request coping/stress of illness support;spiritual/moral support   Receptivity to Spiritual Care visit welcomed

## 2023-12-13 NOTE — H&P
[facility]   HISTORY AND PHYSICAL    Patient Name: Marylou Levin  : 1940  MRN: [id number]  Primary Care Physician:  Brady Rae MD  Date of admission: 2023    Subjective   Subjective     Chief Complaint: Shortness of breath, weakness    HPI:    Marylou Levin is a 83 y.o. female with past medical history of SLE, A-fib not on AC, hypothyroidism, debility, dementia, and GERD presented to the ED from nursing home with increased weakness, shortness of breath and i persistent cough.  Patient has advanced dementia so history was given by son at bedside.  As per family patient has been sick for the last 2 months and has been mostly bedbound with increased weakness, shortness of breath and a productive cough.  Patient had always had a debility and needed one-person assist but over the last 2 months she has been mostly bedbound and had not been getting out of the bed.  Patient has been having worsening shortness of breath worse when lying flat with a productive cough and intermittent chills.  Due to concerns patient was brought to the ED for further evaluation.  In the ED patient's vitals were all within normal limits on nasal cannula.  Labs showed that he had a mildly elevated troponin with proBNP of 1600 with remaining labs being relatively unremarkable given her chronic conditions including a negative COVID flu RSV, urinalysis, and lactic acid.  CTA of the chest was negative for any PE but did show bibasilar airspace opacity with mild cardiomegaly.  Patient admitted for further evaluation and treatment    Review of Systems  Patient with dementia    Personal History     Past Medical History:   Diagnosis Date    Atrial fibrillation     Difficulty walking     Hypokalemia     Hypothyroidism     Idiopathic progressive neuropathy     Muscle weakness     Systemic lupus        History reviewed. No pertinent surgical history.    Family History: family history is not on file. Otherwise pertinent  FHx was reviewed and not pertinent to current issue.    Social History:  reports that she has never smoked. She has never used smokeless tobacco. She reports that she does not drink alcohol and does not use drugs.    Home Medications:  Acetaminophen-DM, Baclofen, HYDROcodone-acetaminophen, chlorthalidone, gabapentin, hydroxychloroquine, levothyroxine, nystatin, ondansetron, potassium chloride, rOPINIRole, raloxifene, and senna      Allergies:  Allergies   Allergen Reactions    Penicillins Unknown - High Severity       Objective   Objective     Vitals:   Temp:  [97.5 °F (36.4 °C)-97.6 °F (36.4 °C)] 97.6 °F (36.4 °C)  Heart Rate:  [] 76  Resp:  [16-22] 16  BP: (103-147)/(59-99) 147/69  Flow (L/min):  [2] 2  Physical Exam    Constitutional: Awake, oriented only to self   Eyes: PERRLA, sclerae anicteric, no conjunctival injection   HENT: NCAT, mucous membranes moist   Neck: Supple, no thyromegaly, no lymphadenopathy, trachea midline   Respiratory: Rhonchi   Cardiovascular: RRR, no murmurs, rubs, or gallops, palpable pedal pulses bilaterally   Gastrointestinal: Positive bowel sounds, soft, nontender, nondistended   Musculoskeletal: No bilateral ankle edema, no clubbing or cyanosis to extremities   Psychiatric: Appropriate affect, cooperative   Neurologic: Oriented x 3, strength symmetric in all extremities, Cranial Nerves grossly intact to confrontation, speech clear   Skin: No rashes     Result Review    Result Review:  I have personally reviewed the results from the time of this admission to 12/12/2023 22:31 EST and agree with these findings:  [x]  Laboratory list / accordion  []  Microbiology  [x]  Radiology  [x]  EKG/Telemetry   []  Cardiology/Vascular   []  Pathology  []  Old records  []  Other:  Most notable findings include: EKG with sinus rhythm with no significant ST changes.  Labs are mildly elevated troponin and BNP, CTA negative for PE but possible bibasilar pneumonia versus effusion.      Assessment &  Plan   Assessment / Plan     Brief Patient Summary:  Marylou Levin is a 83 y.o. female with past medical history of SLE, A-fib not on AC, hypothyroidism, debility, dementia, and GERD presented to the ED from nursing home with increased weakness, shortness of breath and i persistent cough    Active Hospital Problems:  Active Hospital Problems    Diagnosis     **Dyspnea     Acute-on-chronic respiratory failure     Pneumonia due to infectious organism     Hypothyroidism (acquired)     History of dementia      Plan:     Dyspnea  -Admitted telemetry  -Patient becomes intermittent hypoxic especially when laying flat  -Supplemental oxygen as needed  -Patient with elevated proBNP, troponins  -History of lupus  -ABG ordered and pending  -CT imaging negative for PE.  Possible pneumonia  -Empiric antibiotics  -Incentive spirometry  -Mucinex, Tessalon Perles  -Dose of Lasix given  -Echo   -Will follow along    Debility  -Patient with worsening weakness  -Secondary to above  -PT OT    Hypothyroidism  -Update TSH    Hx dementia    GI ppx  DVT ppx      DVT prophylaxis:  Medical DVT prophylaxis orders are present.    CODE STATUS:    Level Of Support Discussed With: Patient  Code Status (Patient has no pulse and is not breathing): CPR (Attempt to Resuscitate)  Medical Interventions (Patient has pulse or is breathing): Full Support    Admission Status:  I believe this patient meets inpatient status.      Electronically signed by Jamal Hinojosa MD, 12/12/23, 10:31 PM EST.

## 2023-12-13 NOTE — PAYOR COMM NOTE
"PATIENT INFORMATION  Name:  Marylou Levin  MRN#:     [id number]  :  1940         ADMISSION INFORMATION  CLASS: Inpatient   DOS:  23        CURRENT ATTENDING PROVIDER INFORMATION  Name/NPI: Annmarie Patricio MD [[id number]]  Phone:  Phone: [phone]        RENDERING FACILITY  Name:  [facility]   NPI:  [id number]  TID:  [id number]  Address:      [address] [place] [address]  Phone  [phone]        CASE MANAGEMENT CONTACT INFORMATION  Phone:      [phone]  Fax:           [phone]        ADMISSION DIAGNOSIS  Shortness of breath [R06.02]  Dyspnea [R06.00]  Acute respiratory failure with hypoxia [J96.01]  Pneumonia due to infectious organism, unspecified laterality, unspecified part of lung [J18.9]        Marylou Levin (83 y.o. Female)       Date of Birth   1940    Social Security Number       Address   [address] [place] [address]    Home Phone   [phone]    MRN   [id number]       Shinto   Caodaism    Marital Status                               Admission Date   23    Admission Type   Emergency    Admitting Provider   Jamal Hinojosa MD    Attending Provider   Annmarie Patricio MD    Department, Room/Bed   [facility] 4TH FLOOR MEDICAL TELEMETRY UNIT, 402/1       Discharge Date       Discharge Disposition       Discharge Destination                                 Attending Provider: Annmarie Patricio MD    Allergies: Penicillins    Isolation: Contact Air   Infection: COVID (rule out) (23), Mycoplasma pneumonia (23)   Code Status: CPR    Ht: 162.6 cm (64.02\")   Wt: 73.7 kg (162 lb 7.7 oz)    Admission Cmt: None   Principal Problem: Dyspnea [R06.00]                   Active Insurance as of 2023       Primary Coverage       Payor Plan Insurance Group Employer/Plan Group    SIGNATURE MEDICARE ADVANTAGE SIGNATURE ADVANTAGE [id number]       Payor Plan Address Payor Plan Phone Number Payor Plan " Fax Number Effective Dates    [address] [address] [phone]  2021 - None Entered    [facility] [id number]         Subscriber Name Subscriber Birth Date Member ID       KRISTOFER NATHAN 1940 [id number]                     Emergency Contacts        (Rel.) Home Phone Work Phone Mobile Phone    DYLAN NATHAN (Son) -- -- [phone]                 History & Physical        Jamal Hinojosa MD at 23 2231           [facility]   HISTORY AND PHYSICAL    Patient Name: Kristofer Nathan  : 1940  MRN: [id number]  Primary Care Physician:  Brady Rae MD  Date of admission: 2023    Subjective  Subjective     Chief Complaint: Shortness of breath, weakness    HPI:    Kristofer Nathan is a 83 y.o. female with past medical history of SLE, A-fib not on AC, hypothyroidism, debility, dementia, and GERD presented to the ED from nursing home with increased weakness, shortness of breath and i persistent cough.  Patient has advanced dementia so history was given by son at bedside.  As per family patient has been sick for the last 2 months and has been mostly bedbound with increased weakness, shortness of breath and a productive cough.  Patient had always had a debility and needed one-person assist but over the last 2 months she has been mostly bedbound and had not been getting out of the bed.  Patient has been having worsening shortness of breath worse when lying flat with a productive cough and intermittent chills.  Due to concerns patient was brought to the ED for further evaluation.  In the ED patient's vitals were all within normal limits on nasal cannula.  Labs showed that he had a mildly elevated troponin with proBNP of 1600 with remaining labs being relatively unremarkable given her chronic conditions including a negative COVID flu RSV, urinalysis, and lactic acid.  CTA of the chest was negative for any PE but did show bibasilar airspace opacity with mild cardiomegaly.  Patient  admitted for further evaluation and treatment    Review of Systems  Patient with dementia    Personal History     Past Medical History:   Diagnosis Date    Atrial fibrillation     Difficulty walking     Hypokalemia     Hypothyroidism     Idiopathic progressive neuropathy     Muscle weakness     Systemic lupus        History reviewed. No pertinent surgical history.    Family History: family history is not on file. Otherwise pertinent FHx was reviewed and not pertinent to current issue.    Social History:  reports that she has never smoked. She has never used smokeless tobacco. She reports that she does not drink alcohol and does not use drugs.    Home Medications:  Acetaminophen-DM, Baclofen, HYDROcodone-acetaminophen, chlorthalidone, gabapentin, hydroxychloroquine, levothyroxine, nystatin, ondansetron, potassium chloride, rOPINIRole, raloxifene, and senna      Allergies:  Allergies   Allergen Reactions    Penicillins Unknown - High Severity       Objective  Objective     Vitals:   Temp:  [97.5 °F (36.4 °C)-97.6 °F (36.4 °C)] 97.6 °F (36.4 °C)  Heart Rate:  [] 76  Resp:  [16-22] 16  BP: (103-147)/(59-99) 147/69  Flow (L/min):  [2] 2  Physical Exam    Constitutional: Awake, oriented only to self   Eyes: PERRLA, sclerae anicteric, no conjunctival injection   HENT: NCAT, mucous membranes moist   Neck: Supple, no thyromegaly, no lymphadenopathy, trachea midline   Respiratory: Rhonchi   Cardiovascular: RRR, no murmurs, rubs, or gallops, palpable pedal pulses bilaterally   Gastrointestinal: Positive bowel sounds, soft, nontender, nondistended   Musculoskeletal: No bilateral ankle edema, no clubbing or cyanosis to extremities   Psychiatric: Appropriate affect, cooperative   Neurologic: Oriented x 3, strength symmetric in all extremities, Cranial Nerves grossly intact to confrontation, speech clear   Skin: No rashes     Result Review   Result Review:  I have personally reviewed the results from the time of this  admission to 12/12/2023 22:31 EST and agree with these findings:  [x]  Laboratory list / accordion  []  Microbiology  [x]  Radiology  [x]  EKG/Telemetry   []  Cardiology/Vascular   []  Pathology  []  Old records  []  Other:  Most notable findings include: EKG with sinus rhythm with no significant ST changes.  Labs are mildly elevated troponin and BNP, CTA negative for PE but possible bibasilar pneumonia versus effusion.      Assessment & Plan  Assessment / Plan     Brief Patient Summary:  Marylou Levin is a 83 y.o. female with past medical history of SLE, A-fib not on AC, hypothyroidism, debility, dementia, and GERD presented to the ED from nursing home with increased weakness, shortness of breath and i persistent cough    Active Hospital Problems:  Active Hospital Problems    Diagnosis     **Dyspnea     Acute-on-chronic respiratory failure     Pneumonia due to infectious organism     Hypothyroidism (acquired)     History of dementia      Plan:     Dyspnea  -Admitted telemetry  -Patient becomes intermittent hypoxic especially when laying flat  -Supplemental oxygen as needed  -Patient with elevated proBNP, troponins  -History of lupus  -ABG ordered and pending  -CT imaging negative for PE.  Possible pneumonia  -Empiric antibiotics  -Incentive spirometry  -Mucinex, Tessalon Perles  -Dose of Lasix given  -Echo   -Will follow along    Debility  -Patient with worsening weakness  -Secondary to above  -PT OT    Hypothyroidism  -Update TSH    Hx dementia    GI ppx  DVT ppx      DVT prophylaxis:  Medical DVT prophylaxis orders are present.    CODE STATUS:    Level Of Support Discussed With: Patient  Code Status (Patient has no pulse and is not breathing): CPR (Attempt to Resuscitate)  Medical Interventions (Patient has pulse or is breathing): Full Support    Admission Status:  I believe this patient meets inpatient status.      Electronically signed by Jamal Hinojosa MD, 12/12/23, 10:31 PM EST.    Electronically signed  by Jamal Hinojosa MD at 12/12/23 2249          Emergency Department Notes        Hoa Byrnes RN at 12/12/23 2040          Transport here for pt    Electronically signed by Hoa Byrnes RN at 12/12/23 2040       Hoa Byrnes RN at 12/12/23 2040          Report to 4th floor RN    Electronically signed by Hoa Byrnes RN at 12/12/23 2040       Hoa Byrnes RN at 12/12/23 1939          Report to DSETINI Bailon    Electronically signed by Hoa Byrnes RN at 12/12/23 1940       Hoa Byrnes RN at 12/12/23 1854          Tele box called for    Electronically signed by Hoa Byrnes RN at 12/12/23 1854       Hoa Byrnes RN at 12/12/23 1808          Pt has been repositioned in bed several times    Electronically signed by Hoa Byrnes RN at 12/12/23 1809       Hoa Byrnes RN at 12/12/23 1730          Pt complaining of pain in her legs, order obtained from Dr. Jay to given her home pain medication which he is ordering.     Electronically signed by Hoa Byrnes RN at 12/12/23 1828       Marlo Jay MD at 12/12/23 1601          Time: 4:01 PM EST  Date of encounter:  12/12/2023  Independent Historian/Clinical History and Information was obtained by:   Patient and Family    History is limited by: N/A    Chief Complaint: Shortness of breath      History of Present Illness:  Patient is a 83 y.o. year old female who presents to the emergency department for evaluation of shortness of breath.  Patient has a history of A-fib, lupus, generalized weakness who presents with complaints of shortness of breath and hypoxia.  Patient chronically on 2 L nasal cannula at the nursing home.  Had oxygen saturations in the 70s and 80s and was sent here for further eval.  They report her oxygen is low when she is laying flat but is improved when she sits up.  She states that she has had a cough and just has not felt well.  Family reports that she has been on 2 rounds of antibiotics recently for pneumonia.  No other  complaints this time    [facility]    Patient Care Team  Primary Care Provider: Brady Rae MD    Past Medical History:     Allergies   Allergen Reactions    Penicillins Unknown - High Severity     Past Medical History:   Diagnosis Date    Atrial fibrillation     Difficulty walking     Hypokalemia     Hypothyroidism     Idiopathic progressive neuropathy     Muscle weakness     Systemic lupus      History reviewed. No pertinent surgical history.  History reviewed. No pertinent family history.    Home Medications:  Prior to Admission medications    Medication Sig Start Date End Date Taking? Authorizing Provider   allopurinol (ZYLOPRIM) 100 MG tablet Take 1 tablet by mouth Daily.    Nilesh Sharp MD   baclofen (LIORESAL) 10 MG tablet Take 0.5 tablets by mouth 3 (Three) Times a Day.    Nilesh Sharp MD   cephalexin (KEFLEX) 500 MG capsule Take 1 capsule by mouth 3 (Three) Times a Day. 10/18/23   Sadie Bull MD   chlorthalidone (HYGROTON) 25 MG tablet Take 1 tablet by mouth Daily.    Nilesh Sharp MD   doxycycline (MONODOX) 100 MG capsule Take 1 capsule by mouth 2 (Two) Times a Day. 9/12/23   Marlo Jay MD   doxycycline (VIBRAMYCIN) 100 MG capsule Take 1 capsule by mouth 2 (Two) Times a Day.    Nilesh Sharp MD   gabapentin (NEURONTIN) 800 MG tablet Take 1 tablet by mouth 3 (Three) Times a Day.    Nilesh Sharp MD   HYDROcodone-acetaminophen (NORCO) 5-325 MG per tablet Take 1 tablet by mouth Every 6 (Six) Hours As Needed.    Nilesh Sharp MD   hydroxychloroquine (PLAQUENIL) 200 MG tablet Take 1 tablet by mouth Daily.    Nilesh hSarp MD   ipratropium (ATROVENT) 0.02 % nebulizer solution Take 2.5 mL by nebulization 4 (Four) Times a Day.    Nilesh Sharp MD   levothyroxine (SYNTHROID, LEVOTHROID) 125 MCG tablet Take 1 tablet by mouth Daily.    Nilesh Sharp MD   nystatin (MYCOSTATIN) 892084 UNIT/GM powder Apply 1 application   "topically to the appropriate area as directed 2 (Two) Times a Day.    Nilesh Sharp MD   ondansetron (ZOFRAN) 4 MG tablet Take 1 tablet by mouth Every 8 (Eight) Hours As Needed for Nausea or Vomiting.    Nilesh Sharp MD   potassium chloride (MICRO-K) 10 MEQ CR capsule Take 1 capsule by mouth 2 (Two) Times a Day.    Nilesh Sharp MD   raloxifene (EVISTA) 60 MG tablet Take 1 tablet by mouth Daily.    Nilesh Sharp MD   rOPINIRole (REQUIP) 0.25 MG tablet Take 1 tablet by mouth Every Night. Take 1 hour before bedtime.    Nilesh Sharp MD   sulfamethoxazole-trimethoprim (BACTRIM,SEPTRA) 400-80 MG tablet Take 1 tablet by mouth 2 (Two) Times a Day.    Nilesh Sharp MD   tuberculin (Tubersol) 5 UNIT/0.1ML injection Inject 0.1 mL into the appropriate area of the skin as directed by provider 1 (One) Time.    Nilesh Sharp MD        Social History:   Social History     Tobacco Use    Smoking status: Never    Smokeless tobacco: Never   Vaping Use    Vaping Use: Never used   Substance Use Topics    Alcohol use: Never    Drug use: Never         Review of Systems:  Review of Systems   Respiratory:  Positive for shortness of breath.         Physical Exam:  /95   Pulse 111   Temp 97.5 °F (36.4 °C) (Oral)   Resp 22   Ht 162.6 cm (64\")   Wt 74.6 kg (164 lb 7.4 oz)   SpO2 98%   BMI 28.23 kg/m²     Physical Exam  Vitals and nursing note reviewed.   Constitutional:       Appearance: Normal appearance.   HENT:      Head: Normocephalic and atraumatic.   Eyes:      General: No scleral icterus.  Cardiovascular:      Rate and Rhythm: Normal rate and regular rhythm.      Heart sounds: Normal heart sounds.   Pulmonary:      Comments: Decreased inspiration, decreased breath sounds bilaterally.  Coarse breath sounds noted on exam.  Abdominal:      Palpations: Abdomen is soft.      Tenderness: There is no abdominal tenderness.   Musculoskeletal:         General: Normal range of " motion.      Cervical back: Normal range of motion.   Skin:     Findings: No rash.   Neurological:      General: No focal deficit present.      Mental Status: She is alert.                  Procedures:  Procedures      Medical Decision Making:      Comorbidities that affect care:    COPD    External Notes reviewed:    Reviewed ER note from 10/18/2023      The following orders were placed and all results were independently analyzed by me:  Orders Placed This Encounter   Procedures    COVID-19, FLU A/B, RSV PCR 1 HR TAT - Swab, Nasopharynx    Blood Culture - Blood,    Blood Culture - Blood,    XR Chest 1 View    CT Chest With Contrast Diagnostic    Crapo Draw    Comprehensive Metabolic Panel    BNP    Single High Sensitivity Troponin T    CBC Auto Differential    High Sensitivity Troponin T 2Hr    Urinalysis With Culture If Indicated - Urine, Clean Catch    Urinalysis, Microscopic Only - Urine, Clean Catch    Lactic Acid, Plasma    NPO Diet NPO Type: Strict NPO    Undress & Gown    Continuous Pulse Oximetry    Vital Signs    Inpatient Hospitalist Consult    Oxygen Therapy- Nasal Cannula; Titrate 1-6 LPM Per SpO2; 90 - 95%    ECG 12 Lead ED Triage Standing Order; SOA    Insert Peripheral IV    CBC & Differential    Green Top (Gel)    Lavender Top    Gold Top - SST    Light Blue Top       Medications Given in the Emergency Department:  Medications   sodium chloride 0.9 % flush 10 mL (has no administration in time range)   cefTRIAXone (ROCEPHIN) IVPB 1,000 mg (1,000 mg Intravenous New Bag 12/12/23 1801)   iopamidol (ISOVUE-370) 76 % injection 100 mL (100 mL Intravenous Given 12/12/23 1656)   HYDROcodone-acetaminophen (NORCO) 7.5-325 MG per tablet 1 tablet (1 tablet Oral Given 12/12/23 1741)        ED Course:    ED Course as of 12/12/23 1824   Tue Dec 12, 2023   1558 EKG interpreted by me  Time: 1423  Heart rate 102  Sinus with multiple PACs, LVH with repolarization abnormalities, nonspecific ST changes [MA]   7790  Family does report that she has been treated with antibiotics for the last couple weeks but has not had improvement.  Discussed admission to the hospital for further workup management.  Will place on antibiotics and admit [MA]   1823 Spoke with Dr. Hinojosa who agrees to admit [MA]      ED Course User Index  [MA] Marlo Jay MD       Labs:    Lab Results (last 24 hours)       Procedure Component Value Units Date/Time    CBC & Differential [[id number]]  (Abnormal) Collected: 12/12/23 1438    Specimen: Blood Updated: 12/12/23 1446    Narrative:      The following orders were created for panel order CBC & Differential.  Procedure                               Abnormality         Status                     ---------                               -----------         ------                     CBC Auto Differential[[id number]]        Abnormal            Final result                 Please view results for these tests on the individual orders.    BNP [[id number]]  (Normal) Collected: 12/12/23 1438    Specimen: Blood Updated: 12/12/23 1503     proBNP 1,621.0 pg/mL     Narrative:      This assay is used as an aid in the diagnosis of individuals suspected of having heart failure. It can be used as an aid in the diagnosis of acute decompensated heart failure (ADHF) in patients presenting with signs and symptoms of ADHF to the emergency department (ED). In addition, NT-proBNP of <300 pg/mL indicates ADHF is not likely.    Age Range Result Interpretation  NT-proBNP Concentration (pg/mL:      <50             Positive            >450                   Gray                 300-450                    Negative             <300    50-75           Positive            >900                  Gray                300-900                  Negative            <300      >75             Positive            >1800                  Gray                300-1800                  Negative            <300    CBC Auto Differential [[id number]]   (Abnormal) Collected: 12/12/23 1438    Specimen: Blood Updated: 12/12/23 1446     WBC 7.85 10*3/mm3      RBC 4.26 10*6/mm3      Hemoglobin 12.7 g/dL      Hematocrit 42.0 %      MCV 98.6 fL      MCH 29.8 pg      MCHC 30.2 g/dL      RDW 12.8 %      RDW-SD 46.4 fl      MPV 10.7 fL      Platelets 150 10*3/mm3      Neutrophil % 63.8 %      Lymphocyte % 25.7 %      Monocyte % 7.0 %      Eosinophil % 2.8 %      Basophil % 0.3 %      Immature Grans % 0.4 %      Neutrophils, Absolute 5.01 10*3/mm3      Lymphocytes, Absolute 2.02 10*3/mm3      Monocytes, Absolute 0.55 10*3/mm3      Eosinophils, Absolute 0.22 10*3/mm3      Basophils, Absolute 0.02 10*3/mm3      Immature Grans, Absolute 0.03 10*3/mm3      nRBC 0.0 /100 WBC     Comprehensive Metabolic Panel [[id number]]  (Abnormal) Collected: 12/12/23 1520    Specimen: Blood from Arm, Left Updated: 12/12/23 1601     Glucose 96 mg/dL      BUN 7 mg/dL      Creatinine 0.49 mg/dL      Sodium 133 mmol/L      Potassium 3.8 mmol/L      Chloride 89 mmol/L      CO2 36.4 mmol/L      Calcium 8.6 mg/dL      Total Protein 5.2 g/dL      Albumin 2.7 g/dL      ALT (SGPT) 6 U/L      AST (SGOT) 13 U/L      Alkaline Phosphatase 57 U/L      Total Bilirubin 0.4 mg/dL      Globulin 2.5 gm/dL      A/G Ratio 1.1 g/dL      BUN/Creatinine Ratio 14.3     Anion Gap 7.6 mmol/L      eGFR 92.5 mL/min/1.73     Narrative:      GFR Normal >60  Chronic Kidney Disease <60  Kidney Failure <15    The GFR formula is only valid for adults with stable renal function between ages 18 and 70.    Single High Sensitivity Troponin T [[id number]]  (Abnormal) Collected: 12/12/23 1520    Specimen: Blood from Arm, Left Updated: 12/12/23 1600     HS Troponin T 65 ng/L     Narrative:      High Sensitive Troponin T Reference Range:  <14.0 ng/L- Negative Female for AMI  <22.0 ng/L- Negative Male for AMI  >=14 - Abnormal Female indicating possible myocardial injury.  >=22 - Abnormal Male indicating possible myocardial injury.    Clinicians would have to utilize clinical acumen, EKG, Troponin, and serial changes to determine if it is an Acute Myocardial Infarction or myocardial injury due to an underlying chronic condition.         COVID-19, FLU A/B, RSV PCR 1 HR TAT - Swab, Nasopharynx [[id number]]  (Normal) Collected: 12/12/23 1609    Specimen: Swab from Nasopharynx Updated: 12/12/23 1710     COVID19 Not Detected     Influenza A PCR Not Detected     Influenza B PCR Not Detected     RSV, PCR Not Detected    Narrative:      Fact sheet for providers: https://www.fda.gov/media/438983/download    Fact sheet for patients: https://www.fda.gov/media/562372/download    Test performed by PCR.    Urinalysis With Culture If Indicated - Straight Cath [[id number]]  (Abnormal) Collected: 12/12/23 1619    Specimen: Urine from Straight Cath Updated: 12/12/23 1727     Color, UA Yellow     Appearance, UA Slightly Cloudy     pH, UA 7.5     Specific Gravity, UA 1.020     Glucose, UA Negative     Ketones, UA Negative     Bilirubin, UA Negative     Blood, UA Trace     Protein, UA Negative     Leuk Esterase, UA Negative     Nitrite, UA Negative     Urobilinogen, UA 0.2 E.U./dL    Narrative:      In absence of clinical symptoms, the presence of pyuria, bacteria, and/or nitrites on the urinalysis result does not correlate with infection.    Urinalysis, Microscopic Only - Straight Cath [[id number]]  (Abnormal) Collected: 12/12/23 1619    Specimen: Urine from Straight Cath Updated: 12/12/23 1802     RBC, UA 0-2 /HPF      WBC, UA 0-2 /HPF      Comment: Urine culture not indicated.        Bacteria, UA Trace /HPF      Squamous Epithelial Cells, UA None Seen /HPF      Hyaline Casts, UA None Seen /LPF      Methodology Manual Light Microscopy    Lactic Acid, Plasma [[id number]] Collected: 12/12/23 1755    Specimen: Blood Updated: 12/12/23 1816    Blood Culture - Blood, Arm, Left [[id number]] Collected: 12/12/23 1755    Specimen: Blood from Arm, Left Updated: 12/12/23 1816     Blood Culture - Blood, Arm, Left [[id number]] Collected: 12/12/23 1801    Specimen: Blood from Arm, Left Updated: 12/12/23 1816             Imaging:    CT Chest With Contrast Diagnostic    Result Date: 12/12/2023  PROCEDURE: CT CHEST W CONTRAST DIAGNOSTIC  COMPARISON:  [facility], CR, XR CHEST 1 VW, 12/12/2023, 15:09. INDICATIONS: Short of air, cough  TECHNIQUE: After obtaining the patient's consent, CT images were obtained with non-ionic intravenous contrast material.   PROTOCOL:   Standard imaging protocol performed    RADIATION:   DLP: 617.7mGy*cm   Automated exposure control was utilized to minimize radiation dose. CONTRAST: 100cc Isovue 370 I.V.  FINDINGS:  Pulmonary arteries: there is excellent opacification of the pulmonary arteries.  No intraluminal filling defects are seen.  There is limited evaluation of the subsegmental lower lobe pulmonary arteries due to consolidation.  Main, right left pulmonary artery are upper limits normal in size.  There is cardiomegaly.  There are no pathologically enlarged hilar or mediastinal lymph nodes.  No significant coronary artery atherosclerotic calcification.  Thoracic aorta has normal caliber.  No pericardial effusion.  There are1 small bilateral pleural effusions.  There is bilateral lower lobe consolidation with volume loss.  Atelectasis versus pneumonia.  There is linear atelectasis in the anterior inferior right upper lobe.  There are no endobronchial lesions.  There are no suspicious pulmonary nodules.  Upper abdomen is unremarkable.  Soft tissues are unremarkable.  There are no aggressive focal lytic or sclerotic osseous lesions.  There is osteopenia.          1. No findings of pulmonary embolus.  Limited evaluation of the lower lobe subsegmental pulmonary arteries due to volume loss and pleural effusions and consolidation. 2. Bibasilar airspace opacities atelectasis versus pneumonia with small bilateral pleural effusions.  Recommend clinical  correlation. 3. Mild cardiomegaly.     JUANA HURTADO MD       Electronically Signed and Approved By: JUANA HURTADO MD on 12/12/2023 at 17:04             XR Chest 1 View    Result Date: 12/12/2023  PROCEDURE: XR CHEST 1 VW  COMPARISON: [facility], CR, CHEST AP/PA 1 VIEW, 7/13/2019, 12:31.  INDICATIONS: cough congestion  FINDINGS:  The depth of inspiration is poor.  There are atelectatic changes in the right upper and left basilar area.  There are no large pleural effusions.        1. Poor inspiration. 2. Atelectatic changes noted involving the lungs.  Whether in part some of the findings may be secondary to underlying multi focal pneumonia is uncertain.       KAYDEN VILLASEÑOR MD       Electronically Signed and Approved By: KAYDEN VILLASEÑOR MD on 12/12/2023 at 15:11                Differential Diagnosis and Discussion:    Dyspnea: Differential diagnosis includes but is not limited to metabolic acidosis, neurological disorders, psychogenic, asthma, pneumothorax, upper airway obstruction, COPD, pneumonia, noncardiogenic pulmonary edema, interstitial lung disease, anemia, congestive heart failure, and pulmonary embolism    All labs were reviewed and interpreted by me.  All X-rays impressions were independently interpreted by me.  EKG was interpreted by me.  CT scan radiology impression was interpreted by me.    MDM     Amount and/or Complexity of Data Reviewed  Decide to obtain previous medical records or to obtain history from someone other than the patient: yes       Patient is 83-year-old female who presents with complaints of shortness of breath.  Has been treated for pneumonia over the last couple weeks and has had a general decline over that time.  She also reports that her oxygen has been dropping into the 80s and 70s despite using oxygen at the nursing home.  Is better when she sits up or worse when she is laying down.  CT shows what appears to be pneumonia with some pleural effusions.  She is not taking great  inspirations here in the emergency room.  She is not hypoxic on her 2 L sitting up but is worse when she is moving or laying flat.  Will need admission to the hospital for further workup management.  Will cover with antibiotics.          Patient Care Considerations:          Consultants/Shared Management Plan:    Hospitalist: I have discussed the case with Dr. Hinojosa who agrees to accept the patient for admission.    Social Determinants of Health:          Disposition and Care Coordination:    Admit:   Through independent evaluation of the patient's history, physical, and imperical data, the patient meets criteria for observation/admission to the hospital.        Final diagnoses:   Acute respiratory failure with hypoxia   Shortness of breath   Pneumonia due to infectious organism, unspecified laterality, unspecified part of lung        ED Disposition       ED Disposition   Decision to Admit    Condition   --    Comment   --               This medical record created using voice recognition software.             Marlo Jay MD  12/12/23 1827      Electronically signed by Marlo Jay MD at 12/12/23 1827       Vital Signs (last day)       Date/Time Temp Temp src Pulse Resp BP Patient Position SpO2    12/13/23 0849 -- -- 109 -- -- -- --    12/13/23 0410 98.1 (36.7) Oral 90 16 107/49 Lying 99    12/12/23 2254 97.6 (36.4) Oral 69 16 122/55 Lying 94    12/12/23 2059 97.6 (36.4) Oral 76 16 147/69 Lying 91    12/12/23 1900 -- -- 97 -- 141/99 -- 98    12/12/23 1845 -- -- 92 -- 139/76 -- 100    12/12/23 1843 -- -- 99 -- -- -- 100    12/12/23 1832 -- -- 95 -- 136/67 -- 99    12/12/23 1815 -- -- 104 -- 103/80 -- 98    12/12/23 1645 -- -- 111 -- 105/95 -- 98    12/12/23 1600 -- -- 93 -- 123/59 -- 99    12/12/23 1441 -- -- -- -- -- -- 96    12/12/23 1423 97.5 (36.4) Oral 97 22 137/68 Sitting --

## 2023-12-14 ENCOUNTER — APPOINTMENT (OUTPATIENT)
Dept: CARDIOLOGY | Facility: HOSPITAL | Age: 83
DRG: 194 | End: 2023-12-14
Payer: MEDICARE

## 2023-12-14 LAB
ALBUMIN SERPL-MCNC: 2.7 G/DL (ref 3.5–5.2)
ALP SERPL-CCNC: 50 U/L (ref 39–117)
ALT SERPL W P-5'-P-CCNC: 5 U/L (ref 1–33)
ANION GAP SERPL CALCULATED.3IONS-SCNC: 6 MMOL/L (ref 5–15)
ANION GAP SERPL CALCULATED.3IONS-SCNC: 7.7 MMOL/L (ref 5–15)
ASCENDING AORTA: 3 CM
AST SERPL-CCNC: 15 U/L (ref 1–32)
BASOPHILS # BLD AUTO: 0.03 10*3/MM3 (ref 0–0.2)
BASOPHILS NFR BLD AUTO: 0.4 % (ref 0–1.5)
BH CV ECHO MEAS - AO MAX PG: 10 MMHG
BH CV ECHO MEAS - AO MEAN PG: 5 MMHG
BH CV ECHO MEAS - AO ROOT DIAM: 2.5 CM
BH CV ECHO MEAS - AO V2 MAX: 157 CM/SEC
BH CV ECHO MEAS - AO V2 VTI: 34.6 CM
BH CV ECHO MEAS - AVA(I,D): 1.7 CM2
BH CV ECHO MEAS - EDV(CUBED): 35.3 ML
BH CV ECHO MEAS - EDV(MOD-SP2): 47.6 ML
BH CV ECHO MEAS - EDV(MOD-SP4): 44 ML
BH CV ECHO MEAS - EF(MOD-BP): 65.6 %
BH CV ECHO MEAS - EF(MOD-SP2): 64.1 %
BH CV ECHO MEAS - EF(MOD-SP4): 68 %
BH CV ECHO MEAS - ESV(CUBED): 12.3 ML
BH CV ECHO MEAS - ESV(MOD-SP2): 17.1 ML
BH CV ECHO MEAS - ESV(MOD-SP4): 14.1 ML
BH CV ECHO MEAS - FS: 29.6 %
BH CV ECHO MEAS - IVS/LVPW: 1.16 CM
BH CV ECHO MEAS - IVSD: 1.53 CM
BH CV ECHO MEAS - LA DIMENSION: 3.6 CM
BH CV ECHO MEAS - LAT PEAK E' VEL: 5.8 CM/SEC
BH CV ECHO MEAS - LV MASS(C)D: 162.9 GRAMS
BH CV ECHO MEAS - LV MAX PG: 3.6 MMHG
BH CV ECHO MEAS - LV MEAN PG: 2 MMHG
BH CV ECHO MEAS - LV V1 MAX: 95.1 CM/SEC
BH CV ECHO MEAS - LV V1 VTI: 18.7 CM
BH CV ECHO MEAS - LVIDD: 3.3 CM
BH CV ECHO MEAS - LVIDS: 2.31 CM
BH CV ECHO MEAS - LVOT AREA: 3.1 CM2
BH CV ECHO MEAS - LVOT DIAM: 2 CM
BH CV ECHO MEAS - LVPWD: 1.32 CM
BH CV ECHO MEAS - MED PEAK E' VEL: 4.8 CM/SEC
BH CV ECHO MEAS - MR MAX PG: 93.7 MMHG
BH CV ECHO MEAS - MR MAX VEL: 484 CM/SEC
BH CV ECHO MEAS - MV A MAX VEL: 95.1 CM/SEC
BH CV ECHO MEAS - MV DEC SLOPE: 392.5 CM/SEC2
BH CV ECHO MEAS - MV DEC TIME: 0.21 SEC
BH CV ECHO MEAS - MV E MAX VEL: 71.8 CM/SEC
BH CV ECHO MEAS - MV E/A: 0.75
BH CV ECHO MEAS - MV MAX PG: 4.5 MMHG
BH CV ECHO MEAS - MV MEAN PG: 1 MMHG
BH CV ECHO MEAS - MV P1/2T: 69.7 MSEC
BH CV ECHO MEAS - MV V2 VTI: 34.3 CM
BH CV ECHO MEAS - MVA(P1/2T): 3.2 CM2
BH CV ECHO MEAS - MVA(VTI): 1.71 CM2
BH CV ECHO MEAS - RAP SYSTOLE: 3 MMHG
BH CV ECHO MEAS - RVDD: 2.21 CM
BH CV ECHO MEAS - RVSP: 26.4 MMHG
BH CV ECHO MEAS - SV(LVOT): 58.7 ML
BH CV ECHO MEAS - SV(MOD-SP2): 30.5 ML
BH CV ECHO MEAS - SV(MOD-SP4): 29.9 ML
BH CV ECHO MEAS - TR MAX PG: 23.4 MMHG
BH CV ECHO MEAS - TR MAX VEL: 242 CM/SEC
BH CV ECHO MEASUREMENTS AVERAGE E/E' RATIO: 13.55
BILIRUB CONJ SERPL-MCNC: <0.2 MG/DL (ref 0–0.3)
BILIRUB INDIRECT SERPL-MCNC: ABNORMAL MG/DL
BILIRUB SERPL-MCNC: 0.3 MG/DL (ref 0–1.2)
BUN SERPL-MCNC: 7 MG/DL (ref 8–23)
BUN SERPL-MCNC: 8 MG/DL (ref 8–23)
BUN/CREAT SERPL: 11.8 (ref 7–25)
BUN/CREAT SERPL: 12.3 (ref 7–25)
CALCIUM SPEC-SCNC: 8.2 MG/DL (ref 8.6–10.5)
CALCIUM SPEC-SCNC: 8.3 MG/DL (ref 8.6–10.5)
CHLORIDE SERPL-SCNC: 82 MMOL/L (ref 98–107)
CHLORIDE SERPL-SCNC: 84 MMOL/L (ref 98–107)
CO2 SERPL-SCNC: 42.3 MMOL/L (ref 22–29)
CO2 SERPL-SCNC: 44 MMOL/L (ref 22–29)
CREAT SERPL-MCNC: 0.57 MG/DL (ref 0.57–1)
CREAT SERPL-MCNC: 0.68 MG/DL (ref 0.57–1)
DEPRECATED RDW RBC AUTO: 45.1 FL (ref 37–54)
EGFRCR SERPLBLD CKD-EPI 2021: 85.5 ML/MIN/1.73
EGFRCR SERPLBLD CKD-EPI 2021: 89.2 ML/MIN/1.73
EOSINOPHIL # BLD AUTO: 0.23 10*3/MM3 (ref 0–0.4)
EOSINOPHIL NFR BLD AUTO: 3.3 % (ref 0.3–6.2)
ERYTHROCYTE [DISTWIDTH] IN BLOOD BY AUTOMATED COUNT: 12.8 % (ref 12.3–15.4)
GLUCOSE SERPL-MCNC: 101 MG/DL (ref 65–99)
GLUCOSE SERPL-MCNC: 78 MG/DL (ref 65–99)
HCT VFR BLD AUTO: 38.4 % (ref 34–46.6)
HGB BLD-MCNC: 12 G/DL (ref 12–15.9)
IMM GRANULOCYTES # BLD AUTO: 0.03 10*3/MM3 (ref 0–0.05)
IMM GRANULOCYTES NFR BLD AUTO: 0.4 % (ref 0–0.5)
IVRT: 90 MS
LEFT ATRIUM VOLUME INDEX: 25.1 ML/M2
LYMPHOCYTES # BLD AUTO: 1.79 10*3/MM3 (ref 0.7–3.1)
LYMPHOCYTES NFR BLD AUTO: 25.8 % (ref 19.6–45.3)
MAGNESIUM SERPL-MCNC: 0.7 MG/DL (ref 1.6–2.4)
MAGNESIUM SERPL-MCNC: 2.3 MG/DL (ref 1.6–2.4)
MCH RBC QN AUTO: 29.9 PG (ref 26.6–33)
MCHC RBC AUTO-ENTMCNC: 31.3 G/DL (ref 31.5–35.7)
MCV RBC AUTO: 95.5 FL (ref 79–97)
MONOCYTES # BLD AUTO: 0.62 10*3/MM3 (ref 0.1–0.9)
MONOCYTES NFR BLD AUTO: 8.9 % (ref 5–12)
NEUTROPHILS NFR BLD AUTO: 4.24 10*3/MM3 (ref 1.7–7)
NEUTROPHILS NFR BLD AUTO: 61.2 % (ref 42.7–76)
NRBC BLD AUTO-RTO: 0 /100 WBC (ref 0–0.2)
PHOSPHATE SERPL-MCNC: 3.5 MG/DL (ref 2.5–4.5)
PLATELET # BLD AUTO: 143 10*3/MM3 (ref 140–450)
PMV BLD AUTO: 10.3 FL (ref 6–12)
POTASSIUM SERPL-SCNC: 2.9 MMOL/L (ref 3.5–5.2)
POTASSIUM SERPL-SCNC: 3.6 MMOL/L (ref 3.5–5.2)
PROT SERPL-MCNC: 4.9 G/DL (ref 6–8.5)
RBC # BLD AUTO: 4.02 10*6/MM3 (ref 3.77–5.28)
SODIUM SERPL-SCNC: 132 MMOL/L (ref 136–145)
SODIUM SERPL-SCNC: 134 MMOL/L (ref 136–145)
WBC NRBC COR # BLD AUTO: 6.94 10*3/MM3 (ref 3.4–10.8)

## 2023-12-14 PROCEDURE — 80048 BASIC METABOLIC PNL TOTAL CA: CPT | Performed by: FAMILY MEDICINE

## 2023-12-14 PROCEDURE — 85025 COMPLETE CBC W/AUTO DIFF WBC: CPT | Performed by: FAMILY MEDICINE

## 2023-12-14 PROCEDURE — 92526 ORAL FUNCTION THERAPY: CPT

## 2023-12-14 PROCEDURE — 25010000002 ENOXAPARIN PER 10 MG: Performed by: FAMILY MEDICINE

## 2023-12-14 PROCEDURE — 83735 ASSAY OF MAGNESIUM: CPT | Performed by: FAMILY MEDICINE

## 2023-12-14 PROCEDURE — 25010000002 POTASSIUM CHLORIDE 10 MEQ/100ML SOLUTION: Performed by: PHYSICIAN ASSISTANT

## 2023-12-14 PROCEDURE — 93306 TTE W/DOPPLER COMPLETE: CPT | Performed by: INTERNAL MEDICINE

## 2023-12-14 PROCEDURE — 93306 TTE W/DOPPLER COMPLETE: CPT

## 2023-12-14 PROCEDURE — 80076 HEPATIC FUNCTION PANEL: CPT | Performed by: FAMILY MEDICINE

## 2023-12-14 PROCEDURE — 99233 SBSQ HOSP IP/OBS HIGH 50: CPT | Performed by: INTERNAL MEDICINE

## 2023-12-14 PROCEDURE — 25010000002 MAGNESIUM SULFATE 4 GM/100ML SOLUTION: Performed by: PHYSICIAN ASSISTANT

## 2023-12-14 PROCEDURE — 25010000002 FUROSEMIDE PER 20 MG: Performed by: INTERNAL MEDICINE

## 2023-12-14 PROCEDURE — 94761 N-INVAS EAR/PLS OXIMETRY MLT: CPT

## 2023-12-14 PROCEDURE — 84100 ASSAY OF PHOSPHORUS: CPT | Performed by: FAMILY MEDICINE

## 2023-12-14 PROCEDURE — 99233 SBSQ HOSP IP/OBS HIGH 50: CPT | Performed by: FAMILY MEDICINE

## 2023-12-14 PROCEDURE — 94799 UNLISTED PULMONARY SVC/PX: CPT

## 2023-12-14 RX ORDER — POTASSIUM CHLORIDE 750 MG/1
40 CAPSULE, EXTENDED RELEASE ORAL ONCE
Status: COMPLETED | OUTPATIENT
Start: 2023-12-14 | End: 2023-12-14

## 2023-12-14 RX ORDER — AZITHROMYCIN 250 MG/1
500 TABLET, FILM COATED ORAL
Status: COMPLETED | OUTPATIENT
Start: 2023-12-14 | End: 2023-12-14

## 2023-12-14 RX ORDER — POTASSIUM CHLORIDE 7.45 MG/ML
10 INJECTION INTRAVENOUS
Status: COMPLETED | OUTPATIENT
Start: 2023-12-14 | End: 2023-12-14

## 2023-12-14 RX ORDER — MAGNESIUM SULFATE HEPTAHYDRATE 40 MG/ML
4 INJECTION, SOLUTION INTRAVENOUS ONCE
Status: COMPLETED | OUTPATIENT
Start: 2023-12-14 | End: 2023-12-15

## 2023-12-14 RX ADMIN — NYSTATIN: 100000 POWDER TOPICAL at 09:01

## 2023-12-14 RX ADMIN — GUAIFENESIN 300 MG: 200 SOLUTION ORAL at 05:37

## 2023-12-14 RX ADMIN — FUROSEMIDE 40 MG: 10 INJECTION, SOLUTION INTRAMUSCULAR; INTRAVENOUS at 08:59

## 2023-12-14 RX ADMIN — WHITE PETROLATUM 1 APPLICATION: 1.75 OINTMENT TOPICAL at 20:45

## 2023-12-14 RX ADMIN — NYSTATIN: 100000 POWDER TOPICAL at 20:45

## 2023-12-14 RX ADMIN — METOPROLOL SUCCINATE 12.5 MG: 25 TABLET, EXTENDED RELEASE ORAL at 09:00

## 2023-12-14 RX ADMIN — POTASSIUM CHLORIDE 10 MEQ: 7.46 INJECTION, SOLUTION INTRAVENOUS at 07:26

## 2023-12-14 RX ADMIN — MAGNESIUM SULFATE HEPTAHYDRATE 4 G: 40 INJECTION, SOLUTION INTRAVENOUS at 07:25

## 2023-12-14 RX ADMIN — POTASSIUM CHLORIDE 10 MEQ: 7.46 INJECTION, SOLUTION INTRAVENOUS at 12:56

## 2023-12-14 RX ADMIN — FUROSEMIDE 40 MG: 10 INJECTION, SOLUTION INTRAMUSCULAR; INTRAVENOUS at 20:45

## 2023-12-14 RX ADMIN — GABAPENTIN 800 MG: 400 CAPSULE ORAL at 21:48

## 2023-12-14 RX ADMIN — HYDROXYCHLOROQUINE SULFATE 200 MG: 200 TABLET ORAL at 09:00

## 2023-12-14 RX ADMIN — GUAIFENESIN 300 MG: 200 SOLUTION ORAL at 23:35

## 2023-12-14 RX ADMIN — Medication 10 ML: at 09:01

## 2023-12-14 RX ADMIN — ROPINIROLE HYDROCHLORIDE 0.5 MG: 0.25 TABLET, FILM COATED ORAL at 20:45

## 2023-12-14 RX ADMIN — POTASSIUM CHLORIDE 40 MEQ: 10 CAPSULE, COATED, EXTENDED RELEASE ORAL at 07:51

## 2023-12-14 RX ADMIN — AZITHROMYCIN DIHYDRATE 500 MG: 250 TABLET ORAL at 18:07

## 2023-12-14 RX ADMIN — LEVOTHYROXINE SODIUM 125 MCG: 125 TABLET ORAL at 05:37

## 2023-12-14 RX ADMIN — GUAIFENESIN 300 MG: 200 SOLUTION ORAL at 12:54

## 2023-12-14 RX ADMIN — GABAPENTIN 800 MG: 400 CAPSULE ORAL at 05:37

## 2023-12-14 RX ADMIN — Medication 10 ML: at 20:45

## 2023-12-14 RX ADMIN — GABAPENTIN 800 MG: 400 CAPSULE ORAL at 14:36

## 2023-12-14 RX ADMIN — POTASSIUM CHLORIDE 10 MEQ: 7.46 INJECTION, SOLUTION INTRAVENOUS at 09:03

## 2023-12-14 RX ADMIN — ENOXAPARIN SODIUM 40 MG: 100 INJECTION SUBCUTANEOUS at 09:01

## 2023-12-14 RX ADMIN — POTASSIUM CHLORIDE 10 MEQ: 7.46 INJECTION, SOLUTION INTRAVENOUS at 11:20

## 2023-12-14 RX ADMIN — GUAIFENESIN 300 MG: 200 SOLUTION ORAL at 18:08

## 2023-12-14 NOTE — PROGRESS NOTES
[facility]   Hospitalist Progress Note  Date: 2023  Patient Name: Marylou Levin  : 1940  MRN: [id number]  Date of admission: 2023      Subjective   Subjective   Chief complaint: Shortness of breath    Summary:  83-year-old female hospitalized on 2023 with shortness of breath symptoms, has history of SLE, permanent atrial fibrillation, not on anticoagulation, hypothyroidism, debility, dementia, GERD without esophagitis, increased work of breathing, cough, bibasilar opacities on imaging, with hypercapnia, placed on supplemental oxygen, troponins elevated but with no signs of cardiac symptoms or pain.  EKG without ischemic changes, is having issues with a flutter, A-fib.  Known history of such.  Pulmonary consulted with ongoing issues with breathing.    Interval follow-up: Seen and examined this morning, no acute distress, no acute major night events, heart rate doing better, on 2 L.  No nausea or vomiting.  Becomes bradycardic at times down to the 40s.  Asymptomatic.  No nausea or vomiting.  White blood cell count 6000, magnesium was critically low at 3.7 this morning, replacing magnesium and potassium were both ordered as potassium was also low at 2.9, repeated after replacement, potassium 3.6, magnesium 2.3.  Creatinine 0.68.  No chest pain or palpitations.  Remains very weak and fatigued.  CODE STATUS changed to DNR/DNI.    Review of systems:  All systems reviewed negative except for generalized weakness, generalized fatigue, shortness of breath, cough      Objective   Objective     Vitals:   Temp:  [97.2 °F (36.2 °C)-97.9 °F (36.6 °C)] 97.5 °F (36.4 °C)  Heart Rate:  [72-96] 74  Resp:  [16-18] 16  BP: (102-129)/(52-75) 115/52  Flow (L/min):  [2] 2  Physical Exam    Constitutional: Awake, alert, no acute distress wearing nasal cannula oxygen, sitting upright in the bed eating breakfast   Eyes: Pupils equal, sclerae anicteric, no conjunctival injection   HENT: NCAT, mucous membranes  "moist   Neck: Supple, full range of motion   Respiratory: Diminished breath sounds with wheezing and rhonchi   Cardiovascular: Irregular rate, regular rhythm, 2 out of 6 systolic murmurs, rubs, or gallops, palpable pedal pulses bilaterally   Gastrointestinal: Positive bowel sounds, soft, nontender, nondistended   Musculoskeletal: No bilateral ankle edema, no clubbing or cyanosis to extremities   Psychiatric: Appropriate affect, cooperative   Neurologic: Oriented x 3, strength symmetric in all extremities, Cranial Nerves grossly intact to confrontation, speech clear   Skin: No rashes visible on exposed skin      Result Review    Result Review:  I have personally reviewed the pertinent results from the past 24 hours to 12/14/2023 18:08 EST and agree with these findings:  [x]  Laboratory   CBC          12/6/2023    17:38 12/12/2023    14:38 12/14/2023    05:07   CBC   WBC 9.36  7.85  6.94    RBC 4.02  4.26  4.02    Hemoglobin 11.8  12.7  12.0    Hematocrit 36.1  42.0  38.4    MCV 89.8  98.6  95.5    MCH 29.4  29.8  29.9    MCHC 32.7  30.2  31.3    RDW 11.8  12.8  12.8    Platelets 104  150  143      BMP          12/6/2023    17:38 12/12/2023    15:20 12/14/2023    05:07 12/14/2023    11:12   BMP   BUN 7  7  7  8    Creatinine 0.58  0.49  0.57  0.68    Sodium 130  133  134  132    Potassium 3.8  3.8  2.9  3.6    Chloride 86  89  84  82    CO2 37.0  36.4  44.0  42.3    Calcium 8.2  8.6  8.2  8.3      LIVER FUNCTION TESTS:      Lab 12/14/23  0507 12/12/23  1520   TOTAL PROTEIN 4.9* 5.2*   ALBUMIN 2.7* 2.7*   GLOBULIN  --  2.5   ALT (SGPT) 5 6   AST (SGOT) 15 13   BILIRUBIN 0.3 0.4   BILIRUBIN DIRECT <0.2  --    ALK PHOS 50 57       [x]  Microbiology No results found for: \"ACANTHNAEG\", \"AFBCX\", \"BPERTUSSISCX\", \"BLOODCX\"  No results found for: \"BCIDPCR\", \"CXREFLEX\", \"CSFCX\", \"CULTURETIS\"  No results found for: \"CULTURES\", \"HSVCX\", \"URCX\"  No results found for: \"EYECULTURE\", \"GCCX\", \"HSVCULTURE\", \"LABHSV\"  No results found " "for: \"LEGIONELLA\", \"MRSACX\", \"MUMPSCX\", \"MYCOPLASCX\"  No results found for: \"NOCARDIACX\", \"STOOLCX\"  No results found for: \"THROATCX\", \"UNSTIMCULT\", \"URINECX\", \"CULTURE\", \"VZVCULTUR\"  No results found for: \"VIRALCULTU\", \"WOUNDCX\"    [x]  Radiology CT Chest With Contrast Diagnostic    Result Date: 12/12/2023  PROCEDURE: CT CHEST W CONTRAST DIAGNOSTIC  COMPARISON:  [facility], CR, XR CHEST 1 VW, 12/12/2023, 15:09. INDICATIONS: Short of air, cough  TECHNIQUE: After obtaining the patient's consent, CT images were obtained with non-ionic intravenous contrast material.   PROTOCOL:   Standard imaging protocol performed    RADIATION:   DLP: 617.7mGy*cm   Automated exposure control was utilized to minimize radiation dose. CONTRAST: 100cc Isovue 370 I.V.  FINDINGS:  Pulmonary arteries: there is excellent opacification of the pulmonary arteries.  No intraluminal filling defects are seen.  There is limited evaluation of the subsegmental lower lobe pulmonary arteries due to consolidation.  Main, right left pulmonary artery are upper limits normal in size.  There is cardiomegaly.  There are no pathologically enlarged hilar or mediastinal lymph nodes.  No significant coronary artery atherosclerotic calcification.  Thoracic aorta has normal caliber.  No pericardial effusion.  There are1 small bilateral pleural effusions.  There is bilateral lower lobe consolidation with volume loss.  Atelectasis versus pneumonia.  There is linear atelectasis in the anterior inferior right upper lobe.  There are no endobronchial lesions.  There are no suspicious pulmonary nodules.  Upper abdomen is unremarkable.  Soft tissues are unremarkable.  There are no aggressive focal lytic or sclerotic osseous lesions.  There is osteopenia.          1. No findings of pulmonary embolus.  Limited evaluation of the lower lobe subsegmental pulmonary arteries due to volume loss and pleural effusions and consolidation. 2. Bibasilar airspace opacities " atelectasis versus pneumonia with small bilateral pleural effusions.  Recommend clinical correlation. 3. Mild cardiomegaly.     JUANA HURTADO MD       Electronically Signed and Approved By: JUANA HURTADO MD on 12/12/2023 at 17:04             XR Chest 1 View    Result Date: 12/12/2023  PROCEDURE: XR CHEST 1 VW  COMPARISON: [facility], CR, CHEST AP/PA 1 VIEW, 7/13/2019, 12:31.  INDICATIONS: cough congestion  FINDINGS:  The depth of inspiration is poor.  There are atelectatic changes in the right upper and left basilar area.  There are no large pleural effusions.        1. Poor inspiration. 2. Atelectatic changes noted involving the lungs.  Whether in part some of the findings may be secondary to underlying multi focal pneumonia is uncertain.       KAYDEN VILLASEÑOR MD       Electronically Signed and Approved By: KAYDEN VILLASEÑOR MD on 12/12/2023 at 15:11               [x]  EKG/Telemetry       []  Cardiology/Vascular   []  Pathology  [x]  Old records  []  Other:    Assessment & Plan   Assessment / Plan     Assessment/Plan:  Assessment:  Acute hypoxemic respiratory failure with hypercapnia  Multifocal pneumonia due to bacterial organism; mycoplasma  Hypothyroidism  Uncontrolled atrial fibrillation/flutter  Not on long-term anticoagulation  Dementia  CKD stage IIIa  Essential hypertension  Renal osteodystrophy  GERD without esophagitis  History of hyperuricemia  Debility  SLE  Elevated troponins without concern for cardiac event    Plan:  Labs and imaging reviewed  Aggressive potassium replacement 40 mill equivalents orally with 10 mEq IV x 4 runs with additional magnesium 4 g x 1 dose  Out of bed to chair  CODE STATUS changed to DNR/DNI  Continue azithromycin 500 mg daily for 3 days total  Continue ceftriaxone 1 g daily for next 24 hours until we have negative culture results  Consulted pulmonary discussed with Dr. Judge, recommendations appreciated; continue diuresis  Continue metoprolol 12.5 mg twice a day  p.o.  Watch for ongoing bradycardia  Echo EF 61 to 65%.  Vena cava dilated, still volume overloaded.  Holding all other home medications until they are required to be resumed  PT/OT  A.m. labs  DNR  DVT prophylaxis with Lovenox; though patient will need anticoagulation for atrial fibrillation if she can tolerate anticoagulation; will transition tomorrow  Clinical course will dictate further management  Discussed with nurse at the bedside        DVT prophylaxis:  Medical DVT prophylaxis orders are present.    CODE STATUS:   Medical Intervention Limits: NO intubation (DNI)  Level Of Support Discussed With: Next of Kin (If No Surrogate)  Code Status (Patient has no pulse and is not breathing): No CPR (Do Not Attempt to Resuscitate)  Medical Interventions (Patient has pulse or is breathing): Limited Support    Electronically signed by Annmarie Patricio MD, 12/14/23, 6:15 PM EST.    Portions of this documentation were transcribed electronically from a voice recognition software.  I confirm all data accurately represents the service(s) I performed at today's visit.

## 2023-12-14 NOTE — SIGNIFICANT NOTE
Wound Eval / Progress Noted    SCOTTIE Palm     Patient Name: Marylou Levin  : 1940  MRN: [id number]  Today's Date: 2023                 Admit Date: 2023    Visit Dx:    ICD-10-CM ICD-9-CM   1. Acute respiratory failure with hypoxia  J96.01 518.81   2. Shortness of breath  R06.02 786.05   3. Pneumonia due to infectious organism, unspecified laterality, unspecified part of lung  J18.9 486   4. Oropharyngeal dysphagia  R13.12 787.22   5. Difficulty walking  R26.2 719.7         Dyspnea    Acute-on-chronic respiratory failure    Pneumonia due to infectious organism    Hypothyroidism (acquired)    History of dementia        Past Medical History:   Diagnosis Date    Atrial fibrillation     Difficulty walking     Hypokalemia     Hypothyroidism     Idiopathic progressive neuropathy     Muscle weakness     Systemic lupus         History reviewed. No pertinent surgical history.      Physical Assessment:  Wound 23 0417 labia (Active)   Dressing Appearance open to air 23 1110   Closure None 23 1110   Base moist;red 23 1110   Periwound moist;pink;intact 23 1110   Periwound Temperature warm 23 1110   Periwound Skin Turgor soft 23 1110   Edges rolled/closed 23 1110   Drainage Amount none 23 1110   Care, Wound cleansed with;soap and water 23 1110   Dressing Care open to air 23 1110       Wound 23 0419 Right anterior plantar (Active)   Dressing Appearance open to air 23 1110   Closure None 23 1110   Base dry 23 1110   Periwound dry;intact 23 1110   Periwound Temperature warm 23 1110   Periwound Skin Turgor soft 23 1110   Edges callused;rolled/closed 23 1110   Drainage Amount none 23 1110   Dressing Care open to air 23 1110      Wound Check / Follow-up: Patient seen today for wound consult. Patient is awake and alert at time of visit. Patient with dry, flaky skin to bilateral lower  extremities. Callused area noted to right plantar foot. Recommending skin care/topical treatment with application of Aquaphor ointment.    Bilateral labia / perineum / bilateral gluteal aspects with blanchable redness. Recommending skin protection with application of barrier cream.    Implement every two hour turns, offload heels, keep patient free from moisture/moisture managed.    Ontiveros catheter in place for bladder management.       Impression: Dry, flaky skin. Callus. Blanchable redness.       Short term goals: Regain skin integrity, skin protection, moisture prevention, pressure reduction, topical treatment, skin care.      Galina Perez RN    12/14/2023    13:21 EST

## 2023-12-14 NOTE — ACP (ADVANCE CARE PLANNING)
Educated on and completed EMS DNR with patients son at bedside. Original placed in chart.    Keerthi YOUNG, RN, CHPN  Palliative Care

## 2023-12-14 NOTE — PROGRESS NOTES
Pulmonary / Critical Care Progress Note      Patient Name: Marylou Levin  : 1940  MRN: [id number]  Primary Care Physician:  Brady Rae MD  Date of admission: 2023    Subjective   Subjective   Follow-up for hypercapnia    Over past 24 hours, continues on azithromycin and ceftriaxone, remains on Lasix.    No acute events overnight.  Refused NIPPV.    This morning,   Sitting up in bed eating breakfast  Son at bedside  Currently on 2 L per nasal cannula  Overall feeling better  Dyspnea improved  Diuresing well  -1.9 L fluid balance  Remains weak and fatigued    Review of Systems  General:  + Fatigue, No Fever  HEENT: No dysphagia, No Visual Changes, no rhinorrhea  Respiratory:  + Productive cough,+Dyspnea, + phlegm, No Pleuritic Pain, + wheezing, no hemoptysis  Cardiovascular: Denies chest pain, denies palpitations,+GARNER, No Chest Pressure  Gastrointestinal:  No Abdominal Pain, No Nausea, No Vomiting, No Diarrhea  Genitourinary:  No Dysuria, No Frequency, No Hesitancy  Musculoskeletal: No muscle pain or swelling  Endocrine:  No Heat Intolerance, No Cold Intolerance  Hematologic:  No Bleeding, No Bruising  Psychiatric:  No Anxiety, No Depression  Neurologic:  No Confusion, no Dysarthria, No Headaches  Skin:  No Rash, No Open Wounds      Objective   Objective     Vitals:   Temp:  [97.2 °F (36.2 °C)-97.9 °F (36.6 °C)] 97.2 °F (36.2 °C)  Heart Rate:  [72-96] 80  Resp:  [16-18] 16  BP: (102-141)/(61-75) 121/75  Flow (L/min):  [2] 2    Physical Exam   Vital Signs Reviewed   General: Pleasant, elderly female, Alert, NAD.    HEENT:  PERRL, EOMI.  OP, nares clear, no sinus tenderness  Neck:  Supple, no JVD, no thyromegaly  Chest:  good aeration, clear to auscultation bilaterally, tympanic to percussion bilaterally, no work of breathing noted on 2 L nasal cannula  CV: RRR, no MGR, pulses 2+, equal.  Abd:  Soft, NT, ND, + BS, no HSM  EXT:  no clubbing, no cyanosis, no edema  Neuro:  A&Ox1, CN grossly  intact, no focal deficits.  Skin: No rashes or lesions noted      Result Review    Result Review:  I have personally reviewed the results from the time of this admission to 12/14/2023 09:17 EST and agree with these findings:  [x]  Laboratory  [x]  Microbiology  [x]  Radiology  [x]  EKG/Telemetry   [x]  Cardiology/Vascular   []  Pathology  []  Old records  []  Other:  Most notable findings include:         Lab 12/14/23  0507 12/12/23  1520 12/12/23  1438   WBC 6.94  --  7.85   HEMOGLOBIN 12.0  --  12.7   HEMATOCRIT 38.4  --  42.0   PLATELETS 143  --  150   SODIUM 134* 133*  --    POTASSIUM 2.9* 3.8  --    CHLORIDE 84* 89*  --    CO2 44.0* 36.4*  --    BUN 7* 7*  --    CREATININE 0.57 0.49*  --    GLUCOSE 78 96  --    CALCIUM 8.2* 8.6  --    PHOSPHORUS 3.5  --   --    TOTAL PROTEIN 4.9* 5.2*  --    ALBUMIN 2.7* 2.7*  --    GLOBULIN  --  2.5  --      XR Chest 1 View    Result Date: 12/12/2023  PROCEDURE: XR CHEST 1 VW  COMPARISON: [facility], , CHEST AP/PA 1 VIEW, 7/13/2019, 12:31.  INDICATIONS: cough congestion  FINDINGS:  The depth of inspiration is poor.  There are atelectatic changes in the right upper and left basilar area.  There are no large pleural effusions.      Impression:   1. Poor inspiration. 2. Atelectatic changes noted involving the lungs.  Whether in part some of the findings may be secondary to underlying multi focal pneumonia is uncertain.       KAYDEN VILLASEÑOR MD       Electronically Signed and Approved By: KAYDEN VILLASEÑOR MD on 12/12/2023 at 15:11              Assessment & Plan   Assessment / Plan     Active Hospital Problems:  Active Hospital Problems    Diagnosis     **Dyspnea     Acute-on-chronic respiratory failure     Pneumonia due to infectious organism     Hypothyroidism (acquired)     History of dementia      Impression:   Acute on chronic respiratory failure, 2 L baseline  Chronic hypercapnic respiratory failure  Mycoplasma pneumonia  Dementia  CKD stage  IIIa  Hypertension  History of SLE  History A-fib/flutter, not on anticoagulation     Plan:   -Continue O2 to maintain SpO2 greater than 90%.  Baseline 2 L per nasal cannula.  -Has chronic baseline hypercapnia based on ABG, will consider starting bipap 12/6 to continue chronically if she is able to tolerate.   -CT chest reviewed revealing bibasilar airspace opacities with small bilateral pleural effusions and mild cardiomegaly.  No pulmonary embolus noted.  -Continue azithromycin x 3 days for mycoplasma pneumonia  -Ceftriaxone discontinued  -Sputum culture no growth  -COVID, flu, RSV, Legionella and strep pneumonia negative  -Check procalcitonin --> 0.04  -Continue bronchopulmonary hygiene  -Encourage use of I-S and flutter valve  -Continue as needed DuoNebs  -Continue Lasix 40 mg IV twice daily  -Trend electrolytes and renal panel.  Replace electrolytes as needed.  -Echocardiogram reviewed revealing EF 61 to 65%, grade 1 diastolic dysfunction, mild to moderate mitral valve regurg  -SLP on board.  Appreciate assistance.       DVT prophylaxis:  Medical DVT prophylaxis orders are present.    CODE STATUS:   Level Of Support Discussed With: Patient  Code Status (Patient has no pulse and is not breathing): CPR (Attempt to Resuscitate)  Medical Interventions (Patient has pulse or is breathing): Full Support      I personally reviewed pertinent labs, imaging and provider notes. Discussed with bedside nurse and will discuss with primary service.     This visit was performed by BOTH a physician and an APC. I personally evaluated and examined the patient. I performed all aspects of MDM as documented. , I have reviewed and confirmed the accuracy of the patient's history as documented in this note., and I have reexamined the patient and the results are consistent with the previously documented exam. I have updated the documentation as necessary.     Electronically signed by David Judge MD, 12/14/23, 5:50 PM EST.        Electronically signed by David Judge MD, 12/14/2023, 09:17 EST.

## 2023-12-14 NOTE — THERAPY TREATMENT NOTE
Acute Care - Speech Language Pathology   Swallow Treatment Note SCOTTIE Palm     Patient Name: Marylou Levin  : 1940  MRN: [id number]  Today's Date: 2023               Admit Date: 2023    Visit Dx:     ICD-10-CM ICD-9-CM   1. Acute respiratory failure with hypoxia  J96.01 518.81   2. Shortness of breath  R06.02 786.05   3. Pneumonia due to infectious organism, unspecified laterality, unspecified part of lung  J18.9 486   4. Oropharyngeal dysphagia  R13.12 787.22   5. Difficulty walking  R26.2 719.7     Patient Active Problem List   Diagnosis    Dyspnea    Acute-on-chronic respiratory failure    Pneumonia due to infectious organism    Hypothyroidism (acquired)    History of dementia     Past Medical History:   Diagnosis Date    Atrial fibrillation     Difficulty walking     Hypokalemia     Hypothyroidism     Idiopathic progressive neuropathy     Muscle weakness     Systemic lupus      History reviewed. No pertinent surgical history.      SPEECH PATHOLOGY DYSPHAGIA TREATMENT    Subjective/Behavioral Observations: Alert and cooperative, sitting up in bed feeding self.  Patient very hard of hearing.      Day/time of Treatment: 2023      Current Diet: Mechanical soft, thin      Current Strategies:Alternate small bites and small sips of solids and liquids at a slow rate.       Treatment received: Dysphagia therapy to address swallow function through exercises and education of strategies.      Results of treatment: Patient feeding self 25% of a.m. meal.  No overt clinical signs or symptoms of aspiration noted.  Patient taking single sips of liquid by straw with no difficulty noted.      Progress toward goals: Good      Barriers to Achieving goals: N/A      Plan of care:/changes in plan: Continue per current plan.  Assist patient with set up.  Medications whole in applesauce.                                      Anticipated Discharge Disposition (SLP): extended care facility ([phone])                                                        Plan of Care Reviewed With: patient, son          EDUCATION  The patient has been educated in the following areas:   Modified Diet Instruction.              Time Calculation:    Time Calculation- SLP       Row Name 12/14/23 0934             Time Calculation- SLP    SLP Stop Time 0800  -TB      SLP Received On 12/14/23  -TB         Untimed Charges    [id number] Treatment Swallow Minutes 40  -TB         Total Minutes    Untimed Charges Total Minutes 40  -TB       Total Minutes 40  -TB                User Key  (r) = Recorded By, (t) = Taken By, (c) = Cosigned By      Initials Name Provider Type    TB Chrissie Fonseca SLP Speech and Language Pathologist                    Therapy Charges for Today       Code Description Service Date Service Provider Modifiers Qty    87229985445 HC ST EVAL ORAL PHARYNG SWALLOW 4 12/13/2023 Chrissie Fonseca SLP GN 1    [id number] HC ST TREATMENT SWALLOW 3 12/14/2023 Chrissie Fonseca SLP GN 1                 EMMY Rabago  12/14/2023

## 2023-12-14 NOTE — CONSULTS
Palliative care consult for code status clarification. Updated by primary rn regarding patients current condition. Patient is sitting up in bed at time of visit. Patient is alert and oriented to person and place only during visit. Patients son at bedside. Introduced self and explained role and purpose of palliative care visit. Patient does not have any ACP documents on file- patients son reports patient does not have any. Patient reportedly has 2 sons- 1 son has been estranged from family for 2 years and patient or patients son have any contact information for him. Discussed code status, provided education on cpr and intubation and code event. Patients son reports patient would not want cpr and would not want intubation. Patients son requests code status change to No CPR/No Intubation. Discussed goals of care. Patients son reports patient has not gotten out of bed at the nursing home in about 2 months. Discussed quality of life and likelihood of repeat admissions. Patients son reports he would like to see patient get stronger so she can get out of bed again. Patients son reports patients  lives at nursing home as well, patients  has dementia and did have hospice care but recently was discharged from hospice services in order to receive iv fluids. Emotional support provided. Updated primary rn and provider. Palliative care will continue to follow up as needed.    Keerthi YOUNG, RN, PN  Palliative Care

## 2023-12-15 LAB
027 TOXIN: NORMAL
ALBUMIN SERPL-MCNC: 2.6 G/DL (ref 3.5–5.2)
ALP SERPL-CCNC: 54 U/L (ref 39–117)
ALT SERPL W P-5'-P-CCNC: <5 U/L (ref 1–33)
ANION GAP SERPL CALCULATED.3IONS-SCNC: 7.2 MMOL/L (ref 5–15)
AST SERPL-CCNC: 13 U/L (ref 1–32)
BASOPHILS # BLD AUTO: 0.03 10*3/MM3 (ref 0–0.2)
BASOPHILS NFR BLD AUTO: 0.4 % (ref 0–1.5)
BILIRUB CONJ SERPL-MCNC: <0.2 MG/DL (ref 0–0.3)
BILIRUB INDIRECT SERPL-MCNC: ABNORMAL MG/DL
BILIRUB SERPL-MCNC: 0.4 MG/DL (ref 0–1.2)
BUN SERPL-MCNC: 9 MG/DL (ref 8–23)
BUN/CREAT SERPL: 13.6 (ref 7–25)
C DIFF TOX GENS STL QL NAA+PROBE: NEGATIVE
CALCIUM SPEC-SCNC: 8.1 MG/DL (ref 8.6–10.5)
CHLORIDE SERPL-SCNC: 81 MMOL/L (ref 98–107)
CO2 SERPL-SCNC: 40.8 MMOL/L (ref 22–29)
CREAT SERPL-MCNC: 0.66 MG/DL (ref 0.57–1)
DEPRECATED RDW RBC AUTO: 44.6 FL (ref 37–54)
EGFRCR SERPLBLD CKD-EPI 2021: 86.1 ML/MIN/1.73
EOSINOPHIL # BLD AUTO: 0.29 10*3/MM3 (ref 0–0.4)
EOSINOPHIL NFR BLD AUTO: 3.4 % (ref 0.3–6.2)
ERYTHROCYTE [DISTWIDTH] IN BLOOD BY AUTOMATED COUNT: 12.8 % (ref 12.3–15.4)
GLUCOSE SERPL-MCNC: 77 MG/DL (ref 65–99)
HCT VFR BLD AUTO: 37.4 % (ref 34–46.6)
HGB BLD-MCNC: 11.9 G/DL (ref 12–15.9)
IMM GRANULOCYTES # BLD AUTO: 0.03 10*3/MM3 (ref 0–0.05)
IMM GRANULOCYTES NFR BLD AUTO: 0.4 % (ref 0–0.5)
LYMPHOCYTES # BLD AUTO: 1.7 10*3/MM3 (ref 0.7–3.1)
LYMPHOCYTES NFR BLD AUTO: 20 % (ref 19.6–45.3)
MAGNESIUM SERPL-MCNC: 1.3 MG/DL (ref 1.6–2.4)
MCH RBC QN AUTO: 30.1 PG (ref 26.6–33)
MCHC RBC AUTO-ENTMCNC: 31.8 G/DL (ref 31.5–35.7)
MCV RBC AUTO: 94.4 FL (ref 79–97)
MONOCYTES # BLD AUTO: 0.75 10*3/MM3 (ref 0.1–0.9)
MONOCYTES NFR BLD AUTO: 8.8 % (ref 5–12)
NEUTROPHILS NFR BLD AUTO: 5.69 10*3/MM3 (ref 1.7–7)
NEUTROPHILS NFR BLD AUTO: 67 % (ref 42.7–76)
NRBC BLD AUTO-RTO: 0 /100 WBC (ref 0–0.2)
PHOSPHATE SERPL-MCNC: 3.1 MG/DL (ref 2.5–4.5)
PLATELET # BLD AUTO: 158 10*3/MM3 (ref 140–450)
PMV BLD AUTO: 10.2 FL (ref 6–12)
POTASSIUM SERPL-SCNC: 3.4 MMOL/L (ref 3.5–5.2)
PROT SERPL-MCNC: 5 G/DL (ref 6–8.5)
RBC # BLD AUTO: 3.96 10*6/MM3 (ref 3.77–5.28)
SODIUM SERPL-SCNC: 129 MMOL/L (ref 136–145)
WBC NRBC COR # BLD AUTO: 8.49 10*3/MM3 (ref 3.4–10.8)

## 2023-12-15 PROCEDURE — 99232 SBSQ HOSP IP/OBS MODERATE 35: CPT | Performed by: INTERNAL MEDICINE

## 2023-12-15 PROCEDURE — 87493 C DIFF AMPLIFIED PROBE: CPT | Performed by: FAMILY MEDICINE

## 2023-12-15 PROCEDURE — 25010000002 MAGNESIUM SULFATE 2 GM/50ML SOLUTION: Performed by: FAMILY MEDICINE

## 2023-12-15 PROCEDURE — 92526 ORAL FUNCTION THERAPY: CPT

## 2023-12-15 PROCEDURE — 99232 SBSQ HOSP IP/OBS MODERATE 35: CPT | Performed by: FAMILY MEDICINE

## 2023-12-15 PROCEDURE — 85025 COMPLETE CBC W/AUTO DIFF WBC: CPT | Performed by: FAMILY MEDICINE

## 2023-12-15 PROCEDURE — 25010000002 FUROSEMIDE PER 20 MG: Performed by: INTERNAL MEDICINE

## 2023-12-15 PROCEDURE — 84100 ASSAY OF PHOSPHORUS: CPT | Performed by: FAMILY MEDICINE

## 2023-12-15 PROCEDURE — 94799 UNLISTED PULMONARY SVC/PX: CPT

## 2023-12-15 PROCEDURE — 83735 ASSAY OF MAGNESIUM: CPT | Performed by: FAMILY MEDICINE

## 2023-12-15 PROCEDURE — 80076 HEPATIC FUNCTION PANEL: CPT | Performed by: FAMILY MEDICINE

## 2023-12-15 PROCEDURE — 25010000002 ENOXAPARIN PER 10 MG: Performed by: FAMILY MEDICINE

## 2023-12-15 PROCEDURE — 80048 BASIC METABOLIC PNL TOTAL CA: CPT | Performed by: FAMILY MEDICINE

## 2023-12-15 RX ORDER — ALBUMIN (HUMAN) 12.5 G/50ML
25 SOLUTION INTRAVENOUS ONCE
Status: COMPLETED | OUTPATIENT
Start: 2023-12-16 | End: 2023-12-16

## 2023-12-15 RX ORDER — METOLAZONE 5 MG/1
10 TABLET ORAL ONCE
Status: COMPLETED | OUTPATIENT
Start: 2023-12-15 | End: 2023-12-15

## 2023-12-15 RX ORDER — POTASSIUM CHLORIDE 750 MG/1
30 CAPSULE, EXTENDED RELEASE ORAL ONCE
Status: COMPLETED | OUTPATIENT
Start: 2023-12-15 | End: 2023-12-15

## 2023-12-15 RX ORDER — MAGNESIUM SULFATE HEPTAHYDRATE 40 MG/ML
2 INJECTION, SOLUTION INTRAVENOUS
Qty: 100 ML | Refills: 0 | Status: COMPLETED | OUTPATIENT
Start: 2023-12-15 | End: 2023-12-15

## 2023-12-15 RX ADMIN — GUAIFENESIN 300 MG: 200 SOLUTION ORAL at 22:56

## 2023-12-15 RX ADMIN — METOPROLOL SUCCINATE 12.5 MG: 25 TABLET, EXTENDED RELEASE ORAL at 08:03

## 2023-12-15 RX ADMIN — Medication 10 ML: at 08:04

## 2023-12-15 RX ADMIN — GUAIFENESIN 300 MG: 200 SOLUTION ORAL at 10:51

## 2023-12-15 RX ADMIN — POTASSIUM CHLORIDE 30 MEQ: 10 CAPSULE, COATED, EXTENDED RELEASE ORAL at 08:03

## 2023-12-15 RX ADMIN — METOPROLOL SUCCINATE 12.5 MG: 25 TABLET, EXTENDED RELEASE ORAL at 20:08

## 2023-12-15 RX ADMIN — LEVOTHYROXINE SODIUM 125 MCG: 125 TABLET ORAL at 05:39

## 2023-12-15 RX ADMIN — FUROSEMIDE 40 MG: 10 INJECTION, SOLUTION INTRAMUSCULAR; INTRAVENOUS at 20:08

## 2023-12-15 RX ADMIN — NYSTATIN: 100000 POWDER TOPICAL at 20:14

## 2023-12-15 RX ADMIN — ROPINIROLE HYDROCHLORIDE 0.5 MG: 0.25 TABLET, FILM COATED ORAL at 20:08

## 2023-12-15 RX ADMIN — MAGNESIUM SULFATE HEPTAHYDRATE 2 G: 40 INJECTION, SOLUTION INTRAVENOUS at 08:03

## 2023-12-15 RX ADMIN — WHITE PETROLATUM 1 APPLICATION: 1.75 OINTMENT TOPICAL at 10:51

## 2023-12-15 RX ADMIN — ENOXAPARIN SODIUM 40 MG: 100 INJECTION SUBCUTANEOUS at 08:04

## 2023-12-15 RX ADMIN — HYDROXYCHLOROQUINE SULFATE 200 MG: 200 TABLET ORAL at 08:03

## 2023-12-15 RX ADMIN — HYDROCORTISONE 1 APPLICATION: 1 OINTMENT TOPICAL at 10:51

## 2023-12-15 RX ADMIN — Medication 10 ML: at 20:08

## 2023-12-15 RX ADMIN — WHITE PETROLATUM 1 APPLICATION: 1.75 OINTMENT TOPICAL at 20:14

## 2023-12-15 RX ADMIN — GUAIFENESIN 300 MG: 200 SOLUTION ORAL at 16:58

## 2023-12-15 RX ADMIN — NYSTATIN: 100000 POWDER TOPICAL at 10:51

## 2023-12-15 RX ADMIN — GABAPENTIN 800 MG: 400 CAPSULE ORAL at 13:12

## 2023-12-15 RX ADMIN — GABAPENTIN 800 MG: 400 CAPSULE ORAL at 05:39

## 2023-12-15 RX ADMIN — MAGNESIUM SULFATE HEPTAHYDRATE 2 G: 40 INJECTION, SOLUTION INTRAVENOUS at 10:51

## 2023-12-15 RX ADMIN — METOLAZONE 10 MG: 5 TABLET ORAL at 10:51

## 2023-12-15 RX ADMIN — DOCUSATE SODIUM 50 MG AND SENNOSIDES 8.6 MG 2 TABLET: 8.6; 5 TABLET, FILM COATED ORAL at 20:12

## 2023-12-15 RX ADMIN — FUROSEMIDE 40 MG: 10 INJECTION, SOLUTION INTRAMUSCULAR; INTRAVENOUS at 08:03

## 2023-12-15 RX ADMIN — GABAPENTIN 800 MG: 400 CAPSULE ORAL at 22:57

## 2023-12-15 NOTE — NURSING NOTE
Palliative care consult for code status, see previous note. Palliative care will sign off. If new needs arise please place new palliative care consult order.    Keerthi YOUNG, RN, CHPN  Palliative Care

## 2023-12-15 NOTE — PLAN OF CARE
Goal Outcome Evaluation:   Patient alert and oriented, intermittent confusion to situation, on 2L NC, c.diff negative, diarrhea throughout the day, novoa cathter patent, NSR on monitor, run of vtach today, wound care completed today

## 2023-12-15 NOTE — THERAPY TREATMENT NOTE
Acute Care - Speech Language Pathology   Swallow Treatment Note SCOTTIE Palm     Patient Name: Marylou Levin  : 1940  MRN: [id number]  Today's Date: 12/15/2023               Admit Date: 2023    Visit Dx:     ICD-10-CM ICD-9-CM   1. Acute respiratory failure with hypoxia  J96.01 518.81   2. Shortness of breath  R06.02 786.05   3. Pneumonia due to infectious organism, unspecified laterality, unspecified part of lung  J18.9 486   4. Oropharyngeal dysphagia  R13.12 787.22   5. Difficulty walking  R26.2 719.7     Patient Active Problem List   Diagnosis    Dyspnea    Acute-on-chronic respiratory failure    Pneumonia due to infectious organism    Hypothyroidism (acquired)    History of dementia     Past Medical History:   Diagnosis Date    Atrial fibrillation     Difficulty walking     Hypokalemia     Hypothyroidism     Idiopathic progressive neuropathy     Muscle weakness     Systemic lupus      History reviewed. No pertinent surgical history.    SPEECH PATHOLOGY DYSPHAGIA TREATMENT     Subjective/Behavioral Observations: Alert and cooperative, sitting up in bed.  Patient very hard of hearing.        Day/time of Treatment: 12/15/2023        Current Diet: Mechanical soft, thin        Current Strategies:Alternate small bites and small sips of solids and liquids at a slow rate.         Treatment received: Dysphagia therapy to address swallow function through exercises and education of strategies.        Results of treatment: Patient feeding self 25% of noon meal.  No overt clinical signs or symptoms of aspiration noted.  Patient taking single sips of liquid by straw with no difficulty noted.Patient with decreased appetite.         Progress toward goals: Good        Barriers to Achieving goals: N/A        Plan of care:/changes in plan: Continue per current plan.  Assist patient with set up.  Medications whole in applesauce.                                                                                    Plan  of Care Reviewed With: patient, son          EDUCATION  The patient has been educated in the following areas:   Modified Diet Instruction.              Time Calculation:    Time Calculation- SLP       Row Name 12/15/23 1239             Time Calculation- SLP    SLP Stop Time 1230  -TB      SLP Received On 12/15/23  -TB         Untimed Charges    94207-RS Treatment Swallow Minutes 40  -TB         Total Minutes    Untimed Charges Total Minutes 40  -TB       Total Minutes 40  -TB                User Key  (r) = Recorded By, (t) = Taken By, (c) = Cosigned By      Initials Name Provider Type    TB Chrissie Fonseca SLP Speech and Language Pathologist                    Therapy Charges for Today       Code Description Service Date Service Provider Modifiers Qty    11391630591 HC ST TREATMENT SWALLOW 3 12/14/2023 Chrissie Fonseca SLP GN 1    [id number] HC ST TREATMENT SWALLOW 3 12/15/2023 Chrissie Fonseca SLP GN 1                 EMMY Rabago  12/15/2023

## 2023-12-15 NOTE — PROGRESS NOTES
[facility]   Hospitalist Progress Note  Date: 12/15/2023  Patient Name: Marylou Levin  : 1940  MRN: [id number]  Date of admission: 2023      Subjective   Subjective     Chief complaint: Shortness of breath    Summary:  83-year-old female hospitalized on 2023 with shortness of breath symptoms, has history of SLE, permanent atrial fibrillation, not on anticoagulation, hypothyroidism, debility, dementia, GERD without esophagitis, increased work of breathing, cough, bibasilar opacities on imaging, with hypercapnia, placed on supplemental oxygen, troponins elevated but with no signs of cardiac symptoms or pain.  EKG without ischemic changes, is having issues with a flutter, A-fib.  Known history of such.  Pulmonary consulted with ongoing issues with breathing.    Interval follow-up: Seen and examined this morning, no acute distress, no acute major night events, overall improving, less cough and shortness of breath.  Still has difficulty at times with oxygenation.  Heart rate still variable, no chest pain or palpitations.  Potassium 3.4, sodium 129, creatinine 0.66, blood cell count 8000.  C. difficile negative.  Remains very weak and fatigued.    Review of systems:  All systems reviewed negative except for generalized weakness, generalized fatigue, shortness of breath, cough    Objective   Objective     Vitals:   Temp:  [97.3 °F (36.3 °C)-98.1 °F (36.7 °C)] 97.8 °F (36.6 °C)  Heart Rate:  [74-95] 95  Resp:  [16-18] 18  BP: ()/(50-74) 102/59  Flow (L/min):  [2] 2  Physical Exam    Constitutional: Awake, alert, no acute distress wearing nasal cannula oxygen, laying in bed, pulse ox not capturing   Eyes: Pupils equal, sclerae anicteric, no conjunctival injection   HENT: NCAT, mucous membranes moist   Neck: Supple, full range of motion   Respiratory: Diminished breath sounds with wheezing and rhonchi   Cardiovascular: Irregular rate, regular rhythm, 2 out of 6 systolic murmurs, rubs, or  "gallops, palpable pedal pulses bilaterally   Gastrointestinal: Positive bowel sounds, soft, nontender, nondistended   Musculoskeletal: No bilateral ankle edema, no clubbing or cyanosis to extremities   Psychiatric: Appropriate affect, cooperative   Neurologic: Alert and oriented, strength symmetric in all extremities weakness throughout, Cranial Nerves grossly intact to confrontation, speech clear   Skin: No rashes visible on exposed skin      Result Review    Result Review:  I have personally reviewed the pertinent results from the past 24 hours to 12/15/2023 11:15 EST and agree with these findings:  [x]  Laboratory   CBC          12/12/2023    14:38 12/14/2023    05:07 12/15/2023    05:28   CBC   WBC 7.85  6.94  8.49    RBC 4.26  4.02  3.96    Hemoglobin 12.7  12.0  11.9    Hematocrit 42.0  38.4  37.4    MCV 98.6  95.5  94.4    MCH 29.8  29.9  30.1    MCHC 30.2  31.3  31.8    RDW 12.8  12.8  12.8    Platelets 150  143  158      BMP          12/12/2023    15:20 12/14/2023    05:07 12/14/2023    11:12 12/15/2023    05:28   BMP   BUN 7  7  8  9    Creatinine 0.49  0.57  0.68  0.66    Sodium 133  134  132  129    Potassium 3.8  2.9  3.6  3.4    Chloride 89  84  82  81    CO2 36.4  44.0  42.3  40.8    Calcium 8.6  8.2  8.3  8.1      LIVER FUNCTION TESTS:      Lab 12/15/23  0528 12/14/23  0507 12/12/23  1520   TOTAL PROTEIN 5.0* 4.9* 5.2*   ALBUMIN 2.6* 2.7* 2.7*   GLOBULIN  --   --  2.5   ALT (SGPT) <5 5 6   AST (SGOT) 13 15 13   BILIRUBIN 0.4 0.3 0.4   BILIRUBIN DIRECT <0.2 <0.2  --    ALK PHOS 54 50 57       [x]  Microbiology No results found for: \"ACANTHNAEG\", \"AFBCX\", \"BPERTUSSISCX\", \"BLOODCX\"  No results found for: \"BCIDPCR\", \"CXREFLEX\", \"CSFCX\", \"CULTURETIS\"  No results found for: \"CULTURES\", \"HSVCX\", \"URCX\"  No results found for: \"EYECULTURE\", \"GCCX\", \"HSVCULTURE\", \"LABHSV\"  No results found for: \"LEGIONELLA\", \"MRSACX\", \"MUMPSCX\", \"MYCOPLASCX\"  No results found for: \"NOCARDIACX\", \"STOOLCX\"  No results found for: " "\"THROATCX\", \"UNSTIMCULT\", \"URINECX\", \"CULTURE\", \"VZVCULTUR\"  No results found for: \"VIRALCULTU\", \"WOUNDCX\"    [x]  Radiology CT Chest With Contrast Diagnostic    Result Date: 12/12/2023  PROCEDURE: CT CHEST W CONTRAST DIAGNOSTIC  COMPARISON:  [facility], CR, XR CHEST 1 VW, 12/12/2023, 15:09. INDICATIONS: Short of air, cough  TECHNIQUE: After obtaining the patient's consent, CT images were obtained with non-ionic intravenous contrast material.   PROTOCOL:   Standard imaging protocol performed    RADIATION:   DLP: 617.7mGy*cm   Automated exposure control was utilized to minimize radiation dose. CONTRAST: 100cc Isovue 370 I.V.  FINDINGS:  Pulmonary arteries: there is excellent opacification of the pulmonary arteries.  No intraluminal filling defects are seen.  There is limited evaluation of the subsegmental lower lobe pulmonary arteries due to consolidation.  Main, right left pulmonary artery are upper limits normal in size.  There is cardiomegaly.  There are no pathologically enlarged hilar or mediastinal lymph nodes.  No significant coronary artery atherosclerotic calcification.  Thoracic aorta has normal caliber.  No pericardial effusion.  There are1 small bilateral pleural effusions.  There is bilateral lower lobe consolidation with volume loss.  Atelectasis versus pneumonia.  There is linear atelectasis in the anterior inferior right upper lobe.  There are no endobronchial lesions.  There are no suspicious pulmonary nodules.  Upper abdomen is unremarkable.  Soft tissues are unremarkable.  There are no aggressive focal lytic or sclerotic osseous lesions.  There is osteopenia.          1. No findings of pulmonary embolus.  Limited evaluation of the lower lobe subsegmental pulmonary arteries due to volume loss and pleural effusions and consolidation. 2. Bibasilar airspace opacities atelectasis versus pneumonia with small bilateral pleural effusions.  Recommend clinical correlation. 3. Mild cardiomegaly. "     JUANA HURTADO MD       Electronically Signed and Approved By: JUANA HURTADO MD on 12/12/2023 at 17:04             XR Chest 1 View    Result Date: 12/12/2023  PROCEDURE: XR CHEST 1 VW  COMPARISON: [facility], CR, CHEST AP/PA 1 VIEW, 7/13/2019, 12:31.  INDICATIONS: cough congestion  FINDINGS:  The depth of inspiration is poor.  There are atelectatic changes in the right upper and left basilar area.  There are no large pleural effusions.        1. Poor inspiration. 2. Atelectatic changes noted involving the lungs.  Whether in part some of the findings may be secondary to underlying multi focal pneumonia is uncertain.       KAYDEN VILLASEÑOR MD       Electronically Signed and Approved By: KAYDEN VILLASEÑOR MD on 12/12/2023 at 15:11               [x]  EKG/Telemetry             []  Cardiology/Vascular   []  Pathology  [x]  Old records  []  Other:    Assessment & Plan   Assessment / Plan     Assessment/Plan:  Assessment:  Acute hypoxemic respiratory failure with hypercapnia  Multifocal pneumonia due to bacterial organism; mycoplasma  Hypothyroidism  Uncontrolled atrial fibrillation/flutter  Not on long-term anticoagulation  Dementia  CKD stage IIIa  Essential hypertension  Renal osteodystrophy  GERD without esophagitis  History of hyperuricemia  Debility  SLE  Elevated troponins without concern for cardiac event    Plan:  Labs and imaging reviewed  Further potassium replacement via oral route today's, 30 mill equivalents  Out of bed to chair  CODE STATUS changed to DNR/DNI  Continue azithromycin 500 mg daily for 3 days total  Continue ceftriaxone 1 g daily for next 24 hours until we have negative culture results  Consulted pulmonary discussed with Dr. Judge, recommendations appreciated; continue diuresis  Continue metoprolol 12.5 mg twice a day p.o.  Holding all other home medications until they are required to be resumed  PT/OT  A.m. labs  DNR  DVT prophylaxis with Lovenox; though patient will need anticoagulation for  atrial fibrillation if she can tolerate anticoagulation; will transition tomorrow  Clinical course will dictate further management  Discussed with nurse at the bedside    DVT prophylaxis:  Medical DVT prophylaxis orders are present.    CODE STATUS:   Medical Intervention Limits: NO intubation (DNI)  Level Of Support Discussed With: Next of Kin (If No Surrogate)  Code Status (Patient has no pulse and is not breathing): No CPR (Do Not Attempt to Resuscitate)  Medical Interventions (Patient has pulse or is breathing): Limited Support    Electronically signed by Annmarie Patricio MD, 12/15/23, 11:19 AM EST.    Portions of this documentation were transcribed electronically from a voice recognition software.  I confirm all data accurately represents the service(s) I performed at today's visit.

## 2023-12-15 NOTE — PROGRESS NOTES
Pulmonary / Critical Care Progress Note      Patient Name: Marylou Levin  : 1940  MRN: [id number]  Primary Care Physician:  Brady Rae MD  Date of admission: 2023    Subjective   Subjective   Follow-up for hypercapnia    Over past 24 hours, completed azithromycin, ceftriaxone discontinued, remains on Lasix.    No acute events overnight.  Refused NIPPV.    This morning,   Sitting up in bed eating breakfast  Remains on 2 L per nasal cannula  Overall feeling better  Dyspnea improved  Diuresing well  -1.7 L fluid balance  Remains weak and bedridden    Review of Systems  General:  + Fatigue, No Fever  HEENT: No dysphagia, No Visual Changes, no rhinorrhea  Respiratory:  + Productive cough,+Dyspnea, + phlegm, No Pleuritic Pain, + wheezing, no hemoptysis  Cardiovascular: Denies chest pain, denies palpitations,+GARNER, No Chest Pressure  Gastrointestinal:  No Abdominal Pain, No Nausea, No Vomiting, No Diarrhea  Genitourinary:  No Dysuria, No Frequency, No Hesitancy  Musculoskeletal: No muscle pain or swelling  Endocrine:  No Heat Intolerance, No Cold Intolerance  Hematologic:  No Bleeding, No Bruising  Psychiatric:  No Anxiety, No Depression  Neurologic:  No Confusion, no Dysarthria, No Headaches  Skin:  No Rash, No Open Wounds      Objective   Objective     Vitals:   Temp:  [97.3 °F (36.3 °C)-98.1 °F (36.7 °C)] 97.9 °F (36.6 °C)  Heart Rate:  [74-83] 81  Resp:  [16-18] 18  BP: ()/(50-74) 99/74  Flow (L/min):  [2] 2    Physical Exam   Vital Signs Reviewed   General: Pleasant, elderly female, Alert, NAD, sitting up in bed.    HEENT:  PERRL, EOMI.  OP, nares clear, no sinus tenderness  Neck:  Supple, no JVD, no thyromegaly  Chest:  good aeration, clear to auscultation bilaterally, tympanic to percussion bilaterally, no work of breathing noted on 2 L nasal cannula  CV: RRR, no MGR, pulses 2+, equal.  Abd:  Soft, NT, ND, + BS, no HSM  EXT:  no clubbing, no cyanosis, no edema  Neuro:  A&Ox1, CN  grossly intact, no focal deficits.  Skin: No rashes or lesions noted      Result Review    Result Review:  I have personally reviewed the results from the time of this admission to 12/15/2023 07:28 EST and agree with these findings:  [x]  Laboratory  [x]  Microbiology  [x]  Radiology  [x]  EKG/Telemetry   [x]  Cardiology/Vascular   []  Pathology  []  Old records  []  Other:  Most notable findings include:         Lab 12/15/23  0528 12/14/23  1112 12/14/23  0507 12/12/23  1520 12/12/23  1438   WBC 8.49  --  6.94  --  7.85   HEMOGLOBIN 11.9*  --  12.0  --  12.7   HEMATOCRIT 37.4  --  38.4  --  42.0   PLATELETS 158  --  143  --  150   SODIUM 129* 132* 134* 133*  --    POTASSIUM 3.4* 3.6 2.9* 3.8  --    CHLORIDE 81* 82* 84* 89*  --    CO2 40.8* 42.3* 44.0* 36.4*  --    BUN 9 8 7* 7*  --    CREATININE 0.66 0.68 0.57 0.49*  --    GLUCOSE 77 101* 78 96  --    CALCIUM 8.1* 8.3* 8.2* 8.6  --    PHOSPHORUS 3.1  --  3.5  --   --    TOTAL PROTEIN 5.0*  --  4.9* 5.2*  --    ALBUMIN 2.6*  --  2.7* 2.7*  --    GLOBULIN  --   --   --  2.5  --      XR Chest 1 View    Result Date: 12/12/2023  PROCEDURE: XR CHEST 1 VW  COMPARISON: [facility], CR, CHEST AP/PA 1 VIEW, 7/13/2019, 12:31.  INDICATIONS: cough congestion  FINDINGS:  The depth of inspiration is poor.  There are atelectatic changes in the right upper and left basilar area.  There are no large pleural effusions.      Impression:   1. Poor inspiration. 2. Atelectatic changes noted involving the lungs.  Whether in part some of the findings may be secondary to underlying multi focal pneumonia is uncertain.       KAYDEN VILLASEÑOR MD       Electronically Signed and Approved By: KAYDEN VILLASEÑOR MD on 12/12/2023 at 15:11            Results for orders placed during the hospital encounter of 12/12/23    Adult Transthoracic Echo Complete W/ Cont if Necessary Per Protocol    Interpretation Summary    Left ventricular systolic function is normal. Left ventricular ejection fraction  appears to be 61 - 65%.    Left ventricular wall thickness is consistent with mild concentric hypertrophy.    Left ventricular diastolic function is consistent with (grade I) impaired relaxation.    Mild to moderate mitral valve regurgitation is present.    Inferior vena cava is dilated.    Assessment & Plan   Assessment / Plan     Active Hospital Problems:  Active Hospital Problems    Diagnosis     **Dyspnea     Acute-on-chronic respiratory failure     Pneumonia due to infectious organism     Hypothyroidism (acquired)     History of dementia      Impression:   Acute on chronic respiratory failure, 2 L baseline  Chronic hypercapnic respiratory failure  Mycoplasma pneumonia  Dementia  CKD stage IIIa  Hypertension  History of SLE  History A-fib/flutter, not on anticoagulation     Plan:   -Continue O2 to maintain SpO2 greater than 90%.  Baseline 2 L per nasal cannula.  -Has chronic baseline hypercapnia based on ABG, will consider starting bipap 12/6 to continue chronically if she is able to tolerate.   -CT chest reviewed revealing bibasilar airspace opacities with small bilateral pleural effusions and mild cardiomegaly.  No pulmonary embolus noted.  -Completed azithromycin x 3 days for mycoplasma pneumonia  -Ceftriaxone discontinued, cultures NGTD  -COVID, flu, RSV, Legionella and strep pneumonia negative  -Continue bronchopulmonary hygiene  -Encourage use of I-S and flutter valve  -Continue as needed DuoNebs  -Continue Lasix 40 mg IV twice daily, add metolazone 10 mg p.o. x 1  -Trend electrolytes and renal panel.  Replace electrolytes as needed.  -Echocardiogram reviewed revealing EF 61 to 65%, grade 1 diastolic dysfunction, mild to moderate mitral valve regurg  -SLP on board.  Appreciate assistance.  -Okay to discharge back to nursing home from pulmonary standpoint    Unless otherwise needed, pulmonary will sign off at this time. Please call with any questions or concerns.       DVT prophylaxis:  Medical DVT  prophylaxis orders are present.    CODE STATUS:   Medical Intervention Limits: NO intubation (DNI)  Level Of Support Discussed With: Next of Kin (If No Surrogate)  Code Status (Patient has no pulse and is not breathing): No CPR (Do Not Attempt to Resuscitate)  Medical Interventions (Patient has pulse or is breathing): Limited Support    I personally reviewed pertinent labs, imaging and provider notes. Discussed with bedside nurse and will discuss with primary service.     Electronically signed by David Judge MD, 12/15/2023, 07:28 EST.  Electronically signed by PATRICK Burgos, 12/15/23, 10:24 AM EST.    This visit was performed by BOTH a physician and an APC. I personally evaluated and examined the patient. I performed all aspects of MDM as documented. , I have reviewed and confirmed the accuracy of the patient's history as documented in this note., and I have reexamined the patient and the results are consistent with the previously documented exam. I have updated the documentation as necessary.     Electronically signed by David Judge MD, 12/15/23, 4:37 PM EST.

## 2023-12-16 VITALS
HEIGHT: 63 IN | BODY MASS INDEX: 28.79 KG/M2 | HEART RATE: 70 BPM | SYSTOLIC BLOOD PRESSURE: 106 MMHG | OXYGEN SATURATION: 99 % | DIASTOLIC BLOOD PRESSURE: 44 MMHG | TEMPERATURE: 98 F | WEIGHT: 162.48 LBS | RESPIRATION RATE: 20 BRPM

## 2023-12-16 PROBLEM — N18.4 STAGE 4 CHRONIC KIDNEY DISEASE: Status: ACTIVE | Noted: 2022-01-20

## 2023-12-16 PROBLEM — M32.9 SYSTEMIC LUPUS ERYTHEMATOSUS: Status: ACTIVE | Noted: 2022-01-20

## 2023-12-16 PROBLEM — M06.9 RHEUMATOID ARTHRITIS: Status: ACTIVE | Noted: 2023-12-16

## 2023-12-16 PROBLEM — E78.5 HYPERLIPIDEMIA: Status: ACTIVE | Noted: 2022-01-20

## 2023-12-16 PROBLEM — I10 HYPERTENSION: Status: ACTIVE | Noted: 2022-01-20

## 2023-12-16 PROBLEM — J15.7 PNEUMONIA DUE TO MYCOPLASMA PNEUMONIAE: Status: ACTIVE | Noted: 2023-12-16

## 2023-12-16 PROBLEM — J45.909 ASTHMA: Status: ACTIVE | Noted: 2023-12-16

## 2023-12-16 LAB
ALBUMIN SERPL-MCNC: 3.4 G/DL (ref 3.5–5.2)
ALP SERPL-CCNC: 54 U/L (ref 39–117)
ALT SERPL W P-5'-P-CCNC: <5 U/L (ref 1–33)
ANION GAP SERPL CALCULATED.3IONS-SCNC: 9.5 MMOL/L (ref 5–15)
AST SERPL-CCNC: 12 U/L (ref 1–32)
BASOPHILS # BLD AUTO: 0.03 10*3/MM3 (ref 0–0.2)
BASOPHILS NFR BLD AUTO: 0.4 % (ref 0–1.5)
BILIRUB CONJ SERPL-MCNC: 0.2 MG/DL (ref 0–0.3)
BILIRUB INDIRECT SERPL-MCNC: 0.5 MG/DL
BILIRUB SERPL-MCNC: 0.7 MG/DL (ref 0–1.2)
BUN SERPL-MCNC: 12 MG/DL (ref 8–23)
BUN/CREAT SERPL: 16.9 (ref 7–25)
CALCIUM SPEC-SCNC: 8.7 MG/DL (ref 8.6–10.5)
CHLORIDE SERPL-SCNC: 80 MMOL/L (ref 98–107)
CO2 SERPL-SCNC: 39.5 MMOL/L (ref 22–29)
CREAT SERPL-MCNC: 0.71 MG/DL (ref 0.57–1)
DEPRECATED RDW RBC AUTO: 43.4 FL (ref 37–54)
EGFRCR SERPLBLD CKD-EPI 2021: 83.4 ML/MIN/1.73
EOSINOPHIL # BLD AUTO: 0.23 10*3/MM3 (ref 0–0.4)
EOSINOPHIL NFR BLD AUTO: 2.8 % (ref 0.3–6.2)
ERYTHROCYTE [DISTWIDTH] IN BLOOD BY AUTOMATED COUNT: 12.6 % (ref 12.3–15.4)
GLUCOSE SERPL-MCNC: 75 MG/DL (ref 65–99)
HCT VFR BLD AUTO: 35.2 % (ref 34–46.6)
HGB BLD-MCNC: 11.2 G/DL (ref 12–15.9)
IMM GRANULOCYTES # BLD AUTO: 0.06 10*3/MM3 (ref 0–0.05)
IMM GRANULOCYTES NFR BLD AUTO: 0.7 % (ref 0–0.5)
LYMPHOCYTES # BLD AUTO: 1.56 10*3/MM3 (ref 0.7–3.1)
LYMPHOCYTES NFR BLD AUTO: 18.8 % (ref 19.6–45.3)
MAGNESIUM SERPL-MCNC: 1.9 MG/DL (ref 1.6–2.4)
MCH RBC QN AUTO: 29.9 PG (ref 26.6–33)
MCHC RBC AUTO-ENTMCNC: 31.8 G/DL (ref 31.5–35.7)
MCV RBC AUTO: 93.9 FL (ref 79–97)
MONOCYTES # BLD AUTO: 0.74 10*3/MM3 (ref 0.1–0.9)
MONOCYTES NFR BLD AUTO: 8.9 % (ref 5–12)
NEUTROPHILS NFR BLD AUTO: 5.7 10*3/MM3 (ref 1.7–7)
NEUTROPHILS NFR BLD AUTO: 68.4 % (ref 42.7–76)
NRBC BLD AUTO-RTO: 0 /100 WBC (ref 0–0.2)
PHOSPHATE SERPL-MCNC: 3.6 MG/DL (ref 2.5–4.5)
PLATELET # BLD AUTO: 169 10*3/MM3 (ref 140–450)
PMV BLD AUTO: 10.3 FL (ref 6–12)
POTASSIUM SERPL-SCNC: 3.3 MMOL/L (ref 3.5–5.2)
PROT SERPL-MCNC: 5.3 G/DL (ref 6–8.5)
RBC # BLD AUTO: 3.75 10*6/MM3 (ref 3.77–5.28)
SODIUM SERPL-SCNC: 129 MMOL/L (ref 136–145)
WBC NRBC COR # BLD AUTO: 8.32 10*3/MM3 (ref 3.4–10.8)

## 2023-12-16 PROCEDURE — 25010000002 ALBUMIN HUMAN 25% PER 50 ML: Performed by: PHYSICIAN ASSISTANT

## 2023-12-16 PROCEDURE — 84100 ASSAY OF PHOSPHORUS: CPT | Performed by: FAMILY MEDICINE

## 2023-12-16 PROCEDURE — 80048 BASIC METABOLIC PNL TOTAL CA: CPT | Performed by: FAMILY MEDICINE

## 2023-12-16 PROCEDURE — P9047 ALBUMIN (HUMAN), 25%, 50ML: HCPCS | Performed by: PHYSICIAN ASSISTANT

## 2023-12-16 PROCEDURE — 94761 N-INVAS EAR/PLS OXIMETRY MLT: CPT

## 2023-12-16 PROCEDURE — 85025 COMPLETE CBC W/AUTO DIFF WBC: CPT | Performed by: FAMILY MEDICINE

## 2023-12-16 PROCEDURE — 99239 HOSP IP/OBS DSCHRG MGMT >30: CPT | Performed by: FAMILY MEDICINE

## 2023-12-16 PROCEDURE — 94799 UNLISTED PULMONARY SVC/PX: CPT

## 2023-12-16 PROCEDURE — 25010000002 ENOXAPARIN PER 10 MG: Performed by: FAMILY MEDICINE

## 2023-12-16 PROCEDURE — 83735 ASSAY OF MAGNESIUM: CPT | Performed by: FAMILY MEDICINE

## 2023-12-16 PROCEDURE — 80076 HEPATIC FUNCTION PANEL: CPT | Performed by: FAMILY MEDICINE

## 2023-12-16 RX ORDER — GABAPENTIN 800 MG/1
800 TABLET ORAL 3 TIMES DAILY
Qty: 6 TABLET | Refills: 0 | Status: SHIPPED | OUTPATIENT
Start: 2023-12-16 | End: 2023-12-18

## 2023-12-16 RX ORDER — HYDROCODONE BITARTRATE AND ACETAMINOPHEN 10; 325 MG/1; MG/1
1 TABLET ORAL EVERY 6 HOURS PRN
Qty: 8 TABLET | Refills: 0 | Status: SHIPPED | OUTPATIENT
Start: 2023-12-16 | End: 2023-12-18

## 2023-12-16 RX ORDER — POTASSIUM CHLORIDE 750 MG/1
30 CAPSULE, EXTENDED RELEASE ORAL ONCE
Status: COMPLETED | OUTPATIENT
Start: 2023-12-16 | End: 2023-12-16

## 2023-12-16 RX ORDER — METOPROLOL SUCCINATE 25 MG/1
12.5 TABLET, EXTENDED RELEASE ORAL EVERY 12 HOURS SCHEDULED
Qty: 30 TABLET | Refills: 0 | Status: SHIPPED | OUTPATIENT
Start: 2023-12-16 | End: 2024-01-15

## 2023-12-16 RX ADMIN — HYDROXYCHLOROQUINE SULFATE 200 MG: 200 TABLET ORAL at 08:40

## 2023-12-16 RX ADMIN — POTASSIUM CHLORIDE 30 MEQ: 10 CAPSULE, COATED, EXTENDED RELEASE ORAL at 08:38

## 2023-12-16 RX ADMIN — ALBUMIN (HUMAN) 25 G: 0.25 INJECTION, SOLUTION INTRAVENOUS at 00:35

## 2023-12-16 RX ADMIN — WHITE PETROLATUM 1 APPLICATION: 1.75 OINTMENT TOPICAL at 08:41

## 2023-12-16 RX ADMIN — NYSTATIN: 100000 POWDER TOPICAL at 08:40

## 2023-12-16 RX ADMIN — GABAPENTIN 800 MG: 400 CAPSULE ORAL at 14:34

## 2023-12-16 RX ADMIN — Medication 10 ML: at 08:41

## 2023-12-16 RX ADMIN — GABAPENTIN 800 MG: 400 CAPSULE ORAL at 05:34

## 2023-12-16 RX ADMIN — ENOXAPARIN SODIUM 40 MG: 100 INJECTION SUBCUTANEOUS at 08:38

## 2023-12-16 RX ADMIN — HYDROCODONE BITARTRATE AND ACETAMINOPHEN 1 TABLET: 10; 325 TABLET ORAL at 04:37

## 2023-12-16 RX ADMIN — LEVOTHYROXINE SODIUM 125 MCG: 125 TABLET ORAL at 05:34

## 2023-12-16 RX ADMIN — GUAIFENESIN 300 MG: 200 SOLUTION ORAL at 05:34

## 2023-12-16 NOTE — DISCHARGE SUMMARY
[facility]         HOSPITALIST  DISCHARGE SUMMARY    Patient Name: Marylou Levin  : 1940  MRN: [id number]    Date of Admission: 2023  Date of Discharge:  2023    Primary Care Physician: Brady Rae MD    Consults       Date and Time Order Name Status Description    2023  6:44 AM Inpatient Pulmonology Consult Completed     2023  5:57 PM Inpatient Hospitalist Consult              Active and Resolved Hospital Problems:    Acute hypoxemic respiratory failure with hypercapnia  Multifocal pneumonia due to bacterial organism; mycoplasma  Hypothyroidism  Uncontrolled atrial fibrillation/flutter  Not on long-term anticoagulation  Dementia  CKD stage IIIa  Essential hypertension  Renal osteodystrophy  GERD without esophagitis  History of hyperuricemia  Debility  SLE  Elevated troponins without concern for cardiac event    Active Hospital Problems    Diagnosis POA   • **Dyspnea [R06.00] Yes   • Pneumonia due to Mycoplasma pneumoniae [J15.7] Yes   • Acute-on-chronic respiratory failure [J96.20] Yes   • Pneumonia due to infectious organism [J18.9] Unknown   • Hypothyroidism (acquired) [E03.9] Unknown   • History of dementia [Z86.59] Not Applicable      Resolved Hospital Problems   No resolved problems to display.       Hospital Course     Hospital Course:  83-year-old female hospitalized on 2023 with shortness of breath symptoms, has history of SLE, permanent atrial fibrillation, not on anticoagulation, hypothyroidism, debility, dementia, GERD without esophagitis, increased work of breathing, cough, bibasilar opacities on imaging, with hypercapnia, placed on supplemental oxygen, troponins elevated but with no signs of cardiac symptoms or pain.  EKG without ischemic changes, is having issues with a flutter, A-fib.  Known history of such.  Pulmonary consulted with ongoing issues with breathing.  Positive breathing improvements with treatment, mycoplasma  pneumonia managed.  Heart rate was controlled with metoprolol.  Potassium was replaced.  Eliquis continued.  Ontiveros catheter removed.  Discharged in hemodynamically stable addition on 12/16/2023 to return to facility where she resides at.  Follow-up with cardiology as outpatient.      Day of Discharge     Vital Signs:  Temp:  [97.6 °F (36.4 °C)-98.2 °F (36.8 °C)] 97.9 °F (36.6 °C)  Heart Rate:  [78] 78  Resp:  [17-20] 20  BP: ()/(42-58) 98/44  Flow (L/min):  [2] 2  Review of systems:  All systems reviewed negative except for generalized weakness, generalized fatigue    Physical Exam              Constitutional: Awake, alert, no acute distress wearing nasal cannula oxygen, laying in bed   Eyes: Pupils equal, sclerae anicteric, no conjunctival injection   HENT: NCAT, mucous membranes moist   Neck: Supple, full range of motion   Respiratory: Diminished breath sounds with wheezing and rhonchi   Cardiovascular: Irregular rate, regular rhythm, 2 out of 6 systolic murmurs, rubs, or gallops, palpable pedal pulses bilaterally   Gastrointestinal: Positive bowel sounds, soft, nontender, nondistended   Musculoskeletal: No bilateral ankle edema, no clubbing or cyanosis to extremities   Psychiatric: Appropriate affect, cooperative   Neurologic: Alert and oriented, strength symmetric in all extremities weakness throughout, Cranial Nerves grossly intact to confrontation, speech clear   Skin: No rashes visible on exposed skin    Discharge Details        Discharge Medications        New Medications        Instructions Start Date   apixaban 5 MG tablet tablet  Commonly known as: ELIQUIS   5 mg, Oral, 2 Times Daily      metoprolol succinate XL 25 MG 24 hr tablet  Commonly known as: TOPROL-XL   12.5 mg, Oral, Every 12 Hours Scheduled             Continue These Medications        Instructions Start Date   Acetaminophen--10 MG/10ML liquid   10 mL, Oral, Every 6 Hours PRN      Baclofen 5 MG tablet  Commonly known as: LIORESAL    5 mg, Oral, 3 Times Daily      gabapentin 800 MG tablet  Commonly known as: NEURONTIN   800 mg, Oral, 3 Times Daily      HYDROcodone-acetaminophen  MG per tablet  Commonly known as: NORCO   1 tablet, Oral, Every 6 Hours PRN      hydroxychloroquine 200 MG tablet  Commonly known as: PLAQUENIL   200 mg, Oral, Daily      levothyroxine 125 MCG tablet  Commonly known as: SYNTHROID, LEVOTHROID   125 mcg, Oral, Daily      nystatin 788001 UNIT/GM powder  Commonly known as: MYCOSTATIN   1 g, Topical, 2 Times Daily      ondansetron 4 MG tablet  Commonly known as: ZOFRAN   4 mg, Oral, Every 6 Hours PRN      potassium chloride 10 MEQ CR capsule  Commonly known as: MICRO-K   10 mEq, Oral, Daily      raloxifene 60 MG tablet  Commonly known as: EVISTA   60 mg, Oral, Daily      rOPINIRole 0.5 MG tablet  Commonly known as: REQUIP   0.5 mg, Oral, Nightly, Take 1 hour before bedtime.      senna 8.6 MG tablet  Commonly known as: SENOKOT   1 tablet, Oral, Daily             Stop These Medications      chlorthalidone 25 MG tablet  Commonly known as: HYGROTON              Allergies   Allergen Reactions   • Penicillins Unknown - Low Severity     Has tolerated Cephalexin and Ceftriaxone -Tomás Cheek [facility]       Discharge Disposition:  Skilled Nursing Facility (DC - External)    Diet:  Hospital:  Diet Order   Procedures   • Diet: Cardiac Diets; Healthy Heart (2-3 Na+); Texture: Mechanical Ground (NDD 2); Fluid Consistency: Thin (IDDSI 0)       Discharge Activity: as tolerates      CODE STATUS:  Code Status and Medical Interventions:   Ordered at: 12/14/23 1330     Medical Intervention Limits:    NO intubation (DNI)     Level Of Support Discussed With:    Next of Kin (If No Surrogate)     Code Status (Patient has no pulse and is not breathing):    No CPR (Do Not Attempt to Resuscitate)     Medical Interventions (Patient has pulse or is breathing):    Limited Support         No future appointments.    Additional Instructions for the  Follow-ups that You Need to Schedule       Discharge Follow-up with PCP   As directed       Currently Documented PCP:    Brady Rae MD    PCP Phone Number:    [phone]     Follow Up Details: 3 to 7 days                Pertinent  and/or Most Recent Results     PROCEDURES:   Adult Transthoracic Echo Complete W/ Cont if Necessary Per Protocol    Result Date: 12/14/2023  •  Left ventricular systolic function is normal. Left ventricular ejection fraction appears to be 61 - 65%. •  Left ventricular wall thickness is consistent with mild concentric hypertrophy. •  Left ventricular diastolic function is consistent with (grade I) impaired relaxation. •  Mild to moderate mitral valve regurgitation is present. •  Inferior vena cava is dilated.     CT Chest With Contrast Diagnostic    Result Date: 12/12/2023  PROCEDURE: CT CHEST W CONTRAST DIAGNOSTIC  COMPARISON:  [facility], CR, XR CHEST 1 VW, 12/12/2023, 15:09. INDICATIONS: Short of air, cough  TECHNIQUE: After obtaining the patient's consent, CT images were obtained with non-ionic intravenous contrast material.   PROTOCOL:   Standard imaging protocol performed    RADIATION:   DLP: 617.7mGy*cm   Automated exposure control was utilized to minimize radiation dose. CONTRAST: 100cc Isovue 370 I.V.  FINDINGS:  Pulmonary arteries: there is excellent opacification of the pulmonary arteries.  No intraluminal filling defects are seen.  There is limited evaluation of the subsegmental lower lobe pulmonary arteries due to consolidation.  Main, right left pulmonary artery are upper limits normal in size.  There is cardiomegaly.  There are no pathologically enlarged hilar or mediastinal lymph nodes.  No significant coronary artery atherosclerotic calcification.  Thoracic aorta has normal caliber.  No pericardial effusion.  There are1 small bilateral pleural effusions.  There is bilateral lower lobe consolidation with volume loss.  Atelectasis versus pneumonia.   There is linear atelectasis in the anterior inferior right upper lobe.  There are no endobronchial lesions.  There are no suspicious pulmonary nodules.  Upper abdomen is unremarkable.  Soft tissues are unremarkable.  There are no aggressive focal lytic or sclerotic osseous lesions.  There is osteopenia.          1. No findings of pulmonary embolus.  Limited evaluation of the lower lobe subsegmental pulmonary arteries due to volume loss and pleural effusions and consolidation. 2. Bibasilar airspace opacities atelectasis versus pneumonia with small bilateral pleural effusions.  Recommend clinical correlation. 3. Mild cardiomegaly.     JUANA HURTADO MD       Electronically Signed and Approved By: JUANA HURTADO MD on 12/12/2023 at 17:04             XR Chest 1 View    Result Date: 12/12/2023  PROCEDURE: XR CHEST 1 VW  COMPARISON: [facility], , CHEST AP/PA 1 VIEW, 7/13/2019, 12:31.  INDICATIONS: cough congestion  FINDINGS:  The depth of inspiration is poor.  There are atelectatic changes in the right upper and left basilar area.  There are no large pleural effusions.        1. Poor inspiration. 2. Atelectatic changes noted involving the lungs.  Whether in part some of the findings may be secondary to underlying multi focal pneumonia is uncertain.       KAYDEN VILLASEÑOR MD       Electronically Signed and Approved By: KAYDEN VILLASEÑOR MD on 12/12/2023 at 15:11                LAB RESULTS:      Lab 12/16/23  0535 12/15/23  0528 12/14/23  0507 12/13/23  0558 12/12/23  1755 12/12/23  1438   WBC 8.32 8.49 6.94  --   --  7.85   HEMOGLOBIN 11.2* 11.9* 12.0  --   --  12.7   HEMATOCRIT 35.2 37.4 38.4  --   --  42.0   PLATELETS 169 158 143  --   --  150   NEUTROS ABS 5.70 5.69 4.24  --   --  5.01   IMMATURE GRANS (ABS) 0.06* 0.03 0.03  --   --  0.03   LYMPHS ABS 1.56 1.70 1.79  --   --  2.02   MONOS ABS 0.74 0.75 0.62  --   --  0.55   EOS ABS 0.23 0.29 0.23  --   --  0.22   MCV 93.9 94.4 95.5  --   --  98.6*   PROCALCITONIN  --    --   --  0.04  --   --    LACTATE  --   --   --   --  1.3  --          Lab 12/16/23  0535 12/15/23  0528 12/14/23  1112 12/14/23  0507 12/13/23  1641 12/12/23  1520   SODIUM 129* 129* 132* 134*  --  133*   POTASSIUM 3.3* 3.4* 3.6 2.9*  --  3.8   CHLORIDE 80* 81* 82* 84*  --  89*   CO2 39.5* 40.8* 42.3* 44.0*  --  36.4*   ANION GAP 9.5 7.2 7.7 6.0  --  7.6   BUN 12 9 8 7*  --  7*   CREATININE 0.71 0.66 0.68 0.57  --  0.49*   EGFR 83.4 86.1 85.5 89.2  --  92.5   GLUCOSE 75 77 101* 78  --  96   CALCIUM 8.7 8.1* 8.3* 8.2*  --  8.6   MAGNESIUM 1.9 1.3* 2.3 0.7*  --   --    PHOSPHORUS 3.6 3.1  --  3.5  --   --    TSH  --   --   --   --  0.382 0.340         Lab 12/16/23  0535 12/15/23  0528 12/14/23  0507 12/12/23  1520   TOTAL PROTEIN 5.3* 5.0* 4.9* 5.2*   ALBUMIN 3.4* 2.6* 2.7* 2.7*   GLOBULIN  --   --   --  2.5   ALT (SGPT) <5 <5 5 6   AST (SGOT) 12 13 15 13   BILIRUBIN 0.7 0.4 0.3 0.4   INDIRECT BILIRUBIN 0.5  --   --   --    BILIRUBIN DIRECT 0.2 <0.2 <0.2  --    ALK PHOS 54 54 50 57         Lab 12/13/23  1641 12/13/23  0558 12/12/23  2043 12/12/23  1520 12/12/23  1438   PROBNP  --   --   --   --  1,621.0   HSTROP T 83* 82* 70* 65*  --                  Lab 12/12/23  2254   PH, ARTERIAL 7.382   PCO2, ARTERIAL 68.0*   PO2 ART 83.2   O2 SATURATION ART 95.7   FIO2 28   HCO3 ART 39.5*   BASE EXCESS ART 11.6*   CARBOXYHEMOGLOBIN 0.3     Brief Urine Lab Results  (Last result in the past 365 days)        Color   Clarity   Blood   Leuk Est   Nitrite   Protein   CREAT   Urine HCG        12/12/23 1619 Yellow   Slightly Cloudy   Trace   Negative   Negative   Negative                 Microbiology Results (last 10 days)       Procedure Component Value - Date/Time    Clostridioides difficile Toxin - Stool, Per Rectum [[id number]] Collected: 12/15/23 0933    Lab Status: Final result Specimen: Stool from Per Rectum Updated: 12/15/23 1101    Narrative:      The following orders were created for panel order Clostridioides difficile Toxin  - Stool, Per Rectum.  Procedure                               Abnormality         Status                     ---------                               -----------         ------                     Clostridioides difficile...[[id number]]                      Final result                 Please view results for these tests on the individual orders.    Clostridioides difficile Toxin, PCR - Stool, Per Rectum [[id number]] Collected: 12/15/23 0944    Lab Status: Final result Specimen: Stool from Per Rectum Updated: 12/15/23 1101     Toxigenic C. difficile by PCR Negative     027 Toxin Presumptive Negative    Narrative:      The result indicates the absence of toxigenic C. difficile from stool specimen.     S. Pneumo Ag Urine or CSF - Urine, Urine, Clean Catch [[id number]]  (Normal) Collected: 12/13/23 0833    Lab Status: Final result Specimen: Urine, Clean Catch Updated: 12/13/23 1024     Strep Pneumo Ag Negative    Legionella Antigen, Urine - Urine, Urine, Clean Catch [[id number]]  (Normal) Collected: 12/13/23 0833    Lab Status: Final result Specimen: Urine, Clean Catch Updated: 12/13/23 1024     LEGIONELLA ANTIGEN, URINE Negative    Blood Culture - Blood, Arm, Left [[id number]]  (Normal) Collected: 12/12/23 1801    Lab Status: Preliminary result Specimen: Blood from Arm, Left Updated: 12/15/23 1832     Blood Culture No growth at 3 days    Blood Culture - Blood, Arm, Left [[id number]]  (Normal) Collected: 12/12/23 1755    Lab Status: Preliminary result Specimen: Blood from Arm, Left Updated: 12/15/23 1832     Blood Culture No growth at 3 days    COVID-19, FLU A/B, RSV PCR 1 HR TAT - Swab, Nasopharynx [[id number]]  (Normal) Collected: 12/12/23 1609    Lab Status: Final result Specimen: Swab from Nasopharynx Updated: 12/12/23 1710     COVID19 Not Detected     Influenza A PCR Not Detected     Influenza B PCR Not Detected     RSV, PCR Not Detected    Narrative:      Fact sheet for providers:  https://www.fda.gov/media/461832/download    Fact sheet for patients: https://www.fda.gov/media/957561/download    Test performed by PCR.    Mycoplasma Pneumoniae Antibody, IgM - Blood, [[id number]]  (Abnormal) Collected: 12/12/23 1438    Lab Status: Final result Specimen: Blood Updated: 12/13/23 0819     Mycoplasma pneumo IgM Positive            CT Chest With Contrast Diagnostic    Result Date: 12/12/2023  Impression:   1. No findings of pulmonary embolus.  Limited evaluation of the lower lobe subsegmental pulmonary arteries due to volume loss and pleural effusions and consolidation. 2. Bibasilar airspace opacities atelectasis versus pneumonia with small bilateral pleural effusions.  Recommend clinical correlation. 3. Mild cardiomegaly.     JUANA HURTADO MD       Electronically Signed and Approved By: JUANA HURTADO MD on 12/12/2023 at 17:04             XR Chest 1 View    Result Date: 12/12/2023  Impression:   1. Poor inspiration. 2. Atelectatic changes noted involving the lungs.  Whether in part some of the findings may be secondary to underlying multi focal pneumonia is uncertain.       KAYDEN VILLASEÑOR MD       Electronically Signed and Approved By: KAYDEN VILLASEÑOR MD on 12/12/2023 at 15:11              Results for orders placed during the hospital encounter of 11/08/23    Duplex Lower Extremity Art / Grafts - Right CAR    Interpretation Summary  •  Normal ankle-brachial indices bilaterally.  •  Normal right infrainguinal arteries.      Results for orders placed during the hospital encounter of 11/08/23    Duplex Lower Extremity Art / Grafts - Right CAR    Interpretation Summary  •  Normal ankle-brachial indices bilaterally.  •  Normal right infrainguinal arteries.      Results for orders placed during the hospital encounter of 12/12/23    Adult Transthoracic Echo Complete W/ Cont if Necessary Per Protocol    Interpretation Summary  •  Left ventricular systolic function is normal. Left ventricular ejection fraction appears  to be 61 - 65%.  •  Left ventricular wall thickness is consistent with mild concentric hypertrophy.  •  Left ventricular diastolic function is consistent with (grade I) impaired relaxation.  •  Mild to moderate mitral valve regurgitation is present.  •  Inferior vena cava is dilated.      Labs Pending at Discharge:  Pending Labs       Order Current Status    Blood Culture - Blood, Arm, Left Preliminary result    Blood Culture - Blood, Arm, Left Preliminary result              Time spent on Discharge including face to face service:  35 minutes    Electronically signed by Annmarie Patricio MD, 12/16/23, 9:10 AM EST.    Portions of this documentation were transcribed electronically from a voice recognition software.  I confirm all data accurately represents the service(s) I performed at today's visit.

## 2023-12-16 NOTE — PLAN OF CARE
Goal Outcome Evaluation:  Plan of Care Reviewed With: patient        Pt rested through the night. Her pressure was slightly low and was given albumin. Provider notified and is ok with the current BP. Will continue to monitor.

## 2023-12-17 LAB
BACTERIA SPEC AEROBE CULT: NORMAL
BACTERIA SPEC AEROBE CULT: NORMAL

## 2023-12-29 LAB
QT INTERVAL: 343 MS
QT INTERVAL: 358 MS
QTC INTERVAL: 427 MS
QTC INTERVAL: 441 MS

## 2024-02-16 ENCOUNTER — APPOINTMENT (OUTPATIENT)
Dept: GENERAL RADIOLOGY | Facility: HOSPITAL | Age: 84
End: 2024-02-16
Payer: MEDICARE

## 2024-02-16 ENCOUNTER — APPOINTMENT (OUTPATIENT)
Dept: CT IMAGING | Facility: HOSPITAL | Age: 84
End: 2024-02-16
Payer: MEDICARE

## 2024-02-16 ENCOUNTER — HOSPITAL ENCOUNTER (INPATIENT)
Facility: HOSPITAL | Age: 84
LOS: 6 days | Discharge: NURSING, REHAB OR LONG-TERM CARE | End: 2024-02-22
Attending: EMERGENCY MEDICINE | Admitting: STUDENT IN AN ORGANIZED HEALTH CARE EDUCATION/TRAINING PROGRAM
Payer: MEDICARE

## 2024-02-16 DIAGNOSIS — N39.0 URINARY TRACT INFECTION WITHOUT HEMATURIA, SITE UNSPECIFIED: ICD-10-CM

## 2024-02-16 DIAGNOSIS — E87.5 HYPERKALEMIA: ICD-10-CM

## 2024-02-16 DIAGNOSIS — Z86.59 HISTORY OF DEMENTIA: Primary | ICD-10-CM

## 2024-02-16 DIAGNOSIS — J96.01 ACUTE RESPIRATORY FAILURE WITH HYPOXIA: ICD-10-CM

## 2024-02-16 DIAGNOSIS — R79.89 ELEVATED TROPONIN: ICD-10-CM

## 2024-02-16 DIAGNOSIS — R13.12 OROPHARYNGEAL DYSPHAGIA: ICD-10-CM

## 2024-02-16 DIAGNOSIS — J81.0 ACUTE PULMONARY EDEMA: ICD-10-CM

## 2024-02-16 DIAGNOSIS — R26.2 DIFFICULTY IN WALKING: ICD-10-CM

## 2024-02-16 PROBLEM — G93.41 ACUTE METABOLIC ENCEPHALOPATHY: Status: ACTIVE | Noted: 2024-02-16

## 2024-02-16 LAB
ALBUMIN SERPL-MCNC: 2.9 G/DL (ref 3.5–5.2)
ALBUMIN/GLOB SERPL: 1 G/DL
ALP SERPL-CCNC: 69 U/L (ref 39–117)
ALT SERPL W P-5'-P-CCNC: 13 U/L (ref 1–33)
AMMONIA BLD-SCNC: 13 UMOL/L (ref 11–51)
ANION GAP SERPL CALCULATED.3IONS-SCNC: 2.3 MMOL/L (ref 5–15)
AST SERPL-CCNC: 21 U/L (ref 1–32)
BACTERIA UR QL AUTO: ABNORMAL /HPF
BASE EXCESS BLDV CALC-SCNC: 8.2 MMOL/L (ref -2–2)
BASOPHILS # BLD AUTO: 0.02 10*3/MM3 (ref 0–0.2)
BASOPHILS NFR BLD AUTO: 0.3 % (ref 0–1.5)
BDY SITE: ABNORMAL
BILIRUB SERPL-MCNC: 0.3 MG/DL (ref 0–1.2)
BILIRUB UR QL STRIP: ABNORMAL
BUN SERPL-MCNC: 27 MG/DL (ref 8–23)
BUN/CREAT SERPL: 32.5 (ref 7–25)
CA-I BLDA-SCNC: 1.14 MMOL/L (ref 1.13–1.32)
CALCIUM SPEC-SCNC: 9.1 MG/DL (ref 8.6–10.5)
CHLORIDE BLDV-SCNC: 90 MMOL/L (ref 98–106)
CHLORIDE SERPL-SCNC: 92 MMOL/L (ref 98–107)
CLARITY UR: ABNORMAL
CO2 SERPL-SCNC: 41.7 MMOL/L (ref 22–29)
COD CRY URNS QL: ABNORMAL /HPF
COHGB MFR BLD: 1.5 % (ref 0–1.5)
COLOR UR: ABNORMAL
CREAT SERPL-MCNC: 0.83 MG/DL (ref 0.57–1)
D-LACTATE SERPL-SCNC: 1.5 MMOL/L (ref 0.5–2)
DEPRECATED RDW RBC AUTO: 53.3 FL (ref 37–54)
EGFRCR SERPLBLD CKD-EPI 2021: 69.2 ML/MIN/1.73
EOSINOPHIL # BLD AUTO: 0.03 10*3/MM3 (ref 0–0.4)
EOSINOPHIL NFR BLD AUTO: 0.4 % (ref 0.3–6.2)
ERYTHROCYTE [DISTWIDTH] IN BLOOD BY AUTOMATED COUNT: 13.5 % (ref 12.3–15.4)
FHHB: 36.8 % (ref 0–5)
FLUAV SUBTYP SPEC NAA+PROBE: NOT DETECTED
FLUBV RNA ISLT QL NAA+PROBE: NOT DETECTED
GAS FLOW AIRWAY: 15 LPM
GEN 5 2HR TROPONIN T REFLEX: 146 NG/L
GLOBULIN UR ELPH-MCNC: 2.8 GM/DL
GLUCOSE BLDA-MCNC: 113 MG/DL (ref 65–99)
GLUCOSE BLDC GLUCOMTR-MCNC: 102 MG/DL (ref 70–99)
GLUCOSE BLDC GLUCOMTR-MCNC: 158 MG/DL (ref 70–99)
GLUCOSE BLDC GLUCOMTR-MCNC: 98 MG/DL (ref 70–99)
GLUCOSE SERPL-MCNC: 129 MG/DL (ref 65–99)
GLUCOSE UR STRIP-MCNC: NEGATIVE MG/DL
GRAN CASTS URNS QL MICRO: ABNORMAL /LPF
HCO3 BLDV-SCNC: 38.9 MMOL/L (ref 22–26)
HCT VFR BLD AUTO: 45.6 % (ref 34–46.6)
HGB BLD-MCNC: 12.9 G/DL (ref 12–15.9)
HGB BLDA-MCNC: 13.9 G/DL (ref 11.7–14.6)
HGB UR QL STRIP.AUTO: ABNORMAL
HOLD SPECIMEN: NORMAL
HOLD SPECIMEN: NORMAL
HYALINE CASTS UR QL AUTO: ABNORMAL /LPF
IMM GRANULOCYTES # BLD AUTO: 0.03 10*3/MM3 (ref 0–0.05)
IMM GRANULOCYTES NFR BLD AUTO: 0.4 % (ref 0–0.5)
INHALED O2 CONCENTRATION: 100 %
INR PPP: 1.45 (ref 0.86–1.15)
KETONES UR QL STRIP: ABNORMAL
LACTATE BLDA-SCNC: 1.76 MMOL/L (ref 0.5–2)
LEUKOCYTE ESTERASE UR QL STRIP.AUTO: ABNORMAL
LYMPHOCYTES # BLD AUTO: 1.29 10*3/MM3 (ref 0.7–3.1)
LYMPHOCYTES NFR BLD AUTO: 17.4 % (ref 19.6–45.3)
MCH RBC QN AUTO: 29.7 PG (ref 26.6–33)
MCHC RBC AUTO-ENTMCNC: 28.3 G/DL (ref 31.5–35.7)
MCV RBC AUTO: 104.8 FL (ref 79–97)
METHGB BLD QL: 0.3 % (ref 0–1.5)
MODALITY: ABNORMAL
MONOCYTES # BLD AUTO: 0.89 10*3/MM3 (ref 0.1–0.9)
MONOCYTES NFR BLD AUTO: 12 % (ref 5–12)
NEUTROPHILS NFR BLD AUTO: 5.15 10*3/MM3 (ref 1.7–7)
NEUTROPHILS NFR BLD AUTO: 69.5 % (ref 42.7–76)
NITRITE UR QL STRIP: NEGATIVE
NRBC BLD AUTO-RTO: 0 /100 WBC (ref 0–0.2)
NT-PROBNP SERPL-MCNC: ABNORMAL PG/ML (ref 0–1800)
OXYHGB MFR BLDV: 61.4 % (ref 94–99)
PCO2 BLDV: 88.8 MM HG (ref 41–51)
PH BLDV: 7.26 PH UNITS (ref 7.31–7.41)
PH UR STRIP.AUTO: 5.5 [PH] (ref 5–8)
PLATELET # BLD AUTO: 187 10*3/MM3 (ref 140–450)
PMV BLD AUTO: 11 FL (ref 6–12)
PO2 BLDV: <40.5 MM HG (ref 35–42)
POTASSIUM BLDV-SCNC: 5.5 MMOL/L (ref 3.5–5)
POTASSIUM SERPL-SCNC: 5.8 MMOL/L (ref 3.5–5.2)
PROCALCITONIN SERPL-MCNC: 0.04 NG/ML (ref 0–0.25)
PROT SERPL-MCNC: 5.7 G/DL (ref 6–8.5)
PROT UR QL STRIP: ABNORMAL
PROTHROMBIN TIME: 17.9 SECONDS (ref 11.8–14.9)
QT INTERVAL: 374 MS
QTC INTERVAL: 393 MS
RBC # BLD AUTO: 4.35 10*6/MM3 (ref 3.77–5.28)
RBC # UR STRIP: ABNORMAL /HPF
REF LAB TEST METHOD: ABNORMAL
RSV RNA NPH QL NAA+NON-PROBE: NOT DETECTED
SAO2 % BLDCOV: 62.5 % (ref 45–75)
SARS-COV-2 RNA RESP QL NAA+PROBE: NOT DETECTED
SODIUM BLDV-SCNC: 133.1 MMOL/L (ref 136–146)
SODIUM SERPL-SCNC: 136 MMOL/L (ref 136–145)
SP GR UR STRIP: 1.02 (ref 1–1.03)
SQUAMOUS #/AREA URNS HPF: ABNORMAL /HPF
TROPONIN T DELTA: -1 NG/L
TROPONIN T SERPL HS-MCNC: 147 NG/L
UROBILINOGEN UR QL STRIP: ABNORMAL
WBC # UR STRIP: ABNORMAL /HPF
WBC NRBC COR # BLD AUTO: 7.41 10*3/MM3 (ref 3.4–10.8)
WHOLE BLOOD HOLD COAG: NORMAL
WHOLE BLOOD HOLD SPECIMEN: NORMAL
YEAST URNS QL MICRO: ABNORMAL /HPF

## 2024-02-16 PROCEDURE — 82948 REAGENT STRIP/BLOOD GLUCOSE: CPT

## 2024-02-16 PROCEDURE — 87086 URINE CULTURE/COLONY COUNT: CPT | Performed by: EMERGENCY MEDICINE

## 2024-02-16 PROCEDURE — 82805 BLOOD GASES W/O2 SATURATION: CPT | Performed by: EMERGENCY MEDICINE

## 2024-02-16 PROCEDURE — 87077 CULTURE AEROBIC IDENTIFY: CPT | Performed by: EMERGENCY MEDICINE

## 2024-02-16 PROCEDURE — 70450 CT HEAD/BRAIN W/O DYE: CPT

## 2024-02-16 PROCEDURE — 36415 COLL VENOUS BLD VENIPUNCTURE: CPT

## 2024-02-16 PROCEDURE — 83880 ASSAY OF NATRIURETIC PEPTIDE: CPT | Performed by: EMERGENCY MEDICINE

## 2024-02-16 PROCEDURE — 83605 ASSAY OF LACTIC ACID: CPT

## 2024-02-16 PROCEDURE — 99223 1ST HOSP IP/OBS HIGH 75: CPT | Performed by: STUDENT IN AN ORGANIZED HEALTH CARE EDUCATION/TRAINING PROGRAM

## 2024-02-16 PROCEDURE — 93005 ELECTROCARDIOGRAM TRACING: CPT

## 2024-02-16 PROCEDURE — 84145 PROCALCITONIN (PCT): CPT | Performed by: EMERGENCY MEDICINE

## 2024-02-16 PROCEDURE — 99285 EMERGENCY DEPT VISIT HI MDM: CPT

## 2024-02-16 PROCEDURE — 93005 ELECTROCARDIOGRAM TRACING: CPT | Performed by: EMERGENCY MEDICINE

## 2024-02-16 PROCEDURE — 87186 SC STD MICRODIL/AGAR DIL: CPT | Performed by: EMERGENCY MEDICINE

## 2024-02-16 PROCEDURE — 88312 SPECIAL STAINS GROUP 1: CPT | Performed by: EMERGENCY MEDICINE

## 2024-02-16 PROCEDURE — 82140 ASSAY OF AMMONIA: CPT | Performed by: EMERGENCY MEDICINE

## 2024-02-16 PROCEDURE — 84484 ASSAY OF TROPONIN QUANT: CPT | Performed by: EMERGENCY MEDICINE

## 2024-02-16 PROCEDURE — 82948 REAGENT STRIP/BLOOD GLUCOSE: CPT | Performed by: EMERGENCY MEDICINE

## 2024-02-16 PROCEDURE — 80053 COMPREHEN METABOLIC PANEL: CPT | Performed by: EMERGENCY MEDICINE

## 2024-02-16 PROCEDURE — 85610 PROTHROMBIN TIME: CPT | Performed by: EMERGENCY MEDICINE

## 2024-02-16 PROCEDURE — 81001 URINALYSIS AUTO W/SCOPE: CPT | Performed by: EMERGENCY MEDICINE

## 2024-02-16 PROCEDURE — 82820 HEMOGLOBIN-OXYGEN AFFINITY: CPT | Performed by: EMERGENCY MEDICINE

## 2024-02-16 PROCEDURE — 84443 ASSAY THYROID STIM HORMONE: CPT | Performed by: STUDENT IN AN ORGANIZED HEALTH CARE EDUCATION/TRAINING PROGRAM

## 2024-02-16 PROCEDURE — 87147 CULTURE TYPE IMMUNOLOGIC: CPT | Performed by: EMERGENCY MEDICINE

## 2024-02-16 PROCEDURE — 71045 X-RAY EXAM CHEST 1 VIEW: CPT

## 2024-02-16 PROCEDURE — 25010000002 FUROSEMIDE PER 20 MG: Performed by: EMERGENCY MEDICINE

## 2024-02-16 PROCEDURE — 85025 COMPLETE CBC W/AUTO DIFF WBC: CPT

## 2024-02-16 PROCEDURE — 87154 CUL TYP ID BLD PTHGN 6+ TRGT: CPT | Performed by: EMERGENCY MEDICINE

## 2024-02-16 PROCEDURE — 87637 SARSCOV2&INF A&B&RSV AMP PRB: CPT | Performed by: EMERGENCY MEDICINE

## 2024-02-16 PROCEDURE — 63710000001 INSULIN REGULAR HUMAN PER 5 UNITS: Performed by: EMERGENCY MEDICINE

## 2024-02-16 PROCEDURE — 25010000002 CEFTRIAXONE PER 250 MG: Performed by: EMERGENCY MEDICINE

## 2024-02-16 PROCEDURE — 87040 BLOOD CULTURE FOR BACTERIA: CPT | Performed by: EMERGENCY MEDICINE

## 2024-02-16 RX ORDER — SODIUM CHLORIDE 0.9 % (FLUSH) 0.9 %
10 SYRINGE (ML) INJECTION AS NEEDED
Status: DISCONTINUED | OUTPATIENT
Start: 2024-02-16 | End: 2024-02-22 | Stop reason: HOSPADM

## 2024-02-16 RX ORDER — FUROSEMIDE 10 MG/ML
40 INJECTION INTRAMUSCULAR; INTRAVENOUS ONCE
Status: COMPLETED | OUTPATIENT
Start: 2024-02-16 | End: 2024-02-16

## 2024-02-16 RX ORDER — DEXTROSE MONOHYDRATE 25 G/50ML
25 INJECTION, SOLUTION INTRAVENOUS ONCE
Status: COMPLETED | OUTPATIENT
Start: 2024-02-16 | End: 2024-02-16

## 2024-02-16 RX ADMIN — INSULIN HUMAN 10 UNITS: 100 INJECTION, SOLUTION PARENTERAL at 21:14

## 2024-02-16 RX ADMIN — CEFTRIAXONE SODIUM 1000 MG: 1 INJECTION, POWDER, FOR SOLUTION INTRAMUSCULAR; INTRAVENOUS at 21:55

## 2024-02-16 RX ADMIN — FUROSEMIDE 40 MG: 10 INJECTION, SOLUTION INTRAMUSCULAR; INTRAVENOUS at 19:55

## 2024-02-16 RX ADMIN — DEXTROSE MONOHYDRATE 25 G: 25 INJECTION, SOLUTION INTRAVENOUS at 21:13

## 2024-02-17 ENCOUNTER — APPOINTMENT (OUTPATIENT)
Dept: GENERAL RADIOLOGY | Facility: HOSPITAL | Age: 84
End: 2024-02-17
Payer: MEDICARE

## 2024-02-17 ENCOUNTER — APPOINTMENT (OUTPATIENT)
Dept: CARDIOLOGY | Facility: HOSPITAL | Age: 84
End: 2024-02-17
Payer: MEDICARE

## 2024-02-17 LAB
ALBUMIN SERPL-MCNC: 2.8 G/DL (ref 3.5–5.2)
ALBUMIN/GLOB SERPL: 1.1 G/DL
ALP SERPL-CCNC: 58 U/L (ref 39–117)
ALT SERPL W P-5'-P-CCNC: 12 U/L (ref 1–33)
ANION GAP SERPL CALCULATED.3IONS-SCNC: 5.9 MMOL/L (ref 5–15)
ARTERIAL PATENCY WRIST A: ABNORMAL
ARTERIAL PATENCY WRIST A: POSITIVE
ARTERIAL PATENCY WRIST A: POSITIVE
AST SERPL-CCNC: 20 U/L (ref 1–32)
B PARAPERT DNA SPEC QL NAA+PROBE: NOT DETECTED
B PERT DNA SPEC QL NAA+PROBE: NOT DETECTED
BACTERIA BLD CULT: ABNORMAL
BASE EXCESS BLDA CALC-SCNC: 11.1 MMOL/L (ref -2–2)
BASE EXCESS BLDA CALC-SCNC: 12.6 MMOL/L (ref -2–2)
BASE EXCESS BLDA CALC-SCNC: 13.6 MMOL/L (ref -2–2)
BASE EXCESS BLDV CALC-SCNC: 15.9 MMOL/L (ref -2–2)
BASOPHILS # BLD AUTO: 0.03 10*3/MM3 (ref 0–0.2)
BASOPHILS NFR BLD AUTO: 0.3 % (ref 0–1.5)
BDY SITE: ABNORMAL
BH CV ECHO MEAS - AO ROOT DIAM: 2.7 CM
BH CV ECHO MEAS - EDV(CUBED): 46 ML
BH CV ECHO MEAS - EDV(MOD-SP2): 37 ML
BH CV ECHO MEAS - EDV(MOD-SP4): 45 ML
BH CV ECHO MEAS - EF(MOD-BP): 64.2 %
BH CV ECHO MEAS - EF(MOD-SP2): 60 %
BH CV ECHO MEAS - EF(MOD-SP4): 68.9 %
BH CV ECHO MEAS - ESV(CUBED): 15 ML
BH CV ECHO MEAS - ESV(MOD-SP2): 14.8 ML
BH CV ECHO MEAS - ESV(MOD-SP4): 14 ML
BH CV ECHO MEAS - FS: 31.2 %
BH CV ECHO MEAS - IVS/LVPW: 1 CM
BH CV ECHO MEAS - IVSD: 1.05 CM
BH CV ECHO MEAS - LA DIMENSION: 2.9 CM
BH CV ECHO MEAS - LAT PEAK E' VEL: 6.7 CM/SEC
BH CV ECHO MEAS - LV DIASTOLIC VOL/BSA (35-75): 25.3 CM2
BH CV ECHO MEAS - LV MASS(C)D: 115.2 GRAMS
BH CV ECHO MEAS - LV SYSTOLIC VOL/BSA (12-30): 7.9 CM2
BH CV ECHO MEAS - LVIDD: 3.6 CM
BH CV ECHO MEAS - LVIDS: 2.47 CM
BH CV ECHO MEAS - LVOT AREA: 2.9 CM2
BH CV ECHO MEAS - LVOT DIAM: 1.91 CM
BH CV ECHO MEAS - LVPWD: 1.05 CM
BH CV ECHO MEAS - MED PEAK E' VEL: 3.9 CM/SEC
BH CV ECHO MEAS - MV A MAX VEL: 78.4 CM/SEC
BH CV ECHO MEAS - MV DEC SLOPE: 408.2 CM/SEC2
BH CV ECHO MEAS - MV DEC TIME: 0.17 SEC
BH CV ECHO MEAS - MV E MAX VEL: 69 CM/SEC
BH CV ECHO MEAS - MV E/A: 0.88
BH CV ECHO MEAS - RVDD: 2.7 CM
BH CV ECHO MEAS - SI(MOD-SP2): 12.5 ML/M2
BH CV ECHO MEAS - SI(MOD-SP4): 17.4 ML/M2
BH CV ECHO MEAS - SV(MOD-SP2): 22.2 ML
BH CV ECHO MEAS - SV(MOD-SP4): 31 ML
BH CV ECHO MEAS - TAPSE (>1.6): 1.42 CM
BH CV ECHO MEAS - TR MAX PG: 33.8 MMHG
BH CV ECHO MEAS - TR MAX VEL: 290.5 CM/SEC
BH CV ECHO MEASUREMENTS AVERAGE E/E' RATIO: 13.02
BILIRUB SERPL-MCNC: 0.4 MG/DL (ref 0–1.2)
BOTTLE TYPE: ABNORMAL
BUN SERPL-MCNC: 28 MG/DL (ref 8–23)
BUN/CREAT SERPL: 32.9 (ref 7–25)
C PNEUM DNA NPH QL NAA+NON-PROBE: NOT DETECTED
CA-I BLDA-SCNC: 1.19 MMOL/L (ref 1.13–1.32)
CA-I BLDA-SCNC: 1.2 MMOL/L (ref 1.13–1.32)
CALCIUM SPEC-SCNC: 9.4 MG/DL (ref 8.6–10.5)
CHLORIDE BLDA-SCNC: 91 MMOL/L (ref 98–106)
CHLORIDE BLDA-SCNC: 91 MMOL/L (ref 98–106)
CHLORIDE SERPL-SCNC: 93 MMOL/L (ref 98–107)
CO2 SERPL-SCNC: 38.1 MMOL/L (ref 22–29)
COHGB MFR BLD: 0.1 % (ref 0–1.5)
COHGB MFR BLD: 0.3 % (ref 0–1.5)
COHGB MFR BLD: 0.4 % (ref 0–1.5)
COHGB MFR BLD: 0.4 % (ref 0–1.5)
CREAT SERPL-MCNC: 0.85 MG/DL (ref 0.57–1)
DEPRECATED RDW RBC AUTO: 53.9 FL (ref 37–54)
EGFRCR SERPLBLD CKD-EPI 2021: 67.2 ML/MIN/1.73
EOSINOPHIL # BLD AUTO: 0.07 10*3/MM3 (ref 0–0.4)
EOSINOPHIL NFR BLD AUTO: 0.8 % (ref 0.3–6.2)
ERYTHROCYTE [DISTWIDTH] IN BLOOD BY AUTOMATED COUNT: 14 % (ref 12.3–15.4)
FHHB: 1.2 % (ref 0–5)
FHHB: 1.3 % (ref 0–5)
FHHB: 3.5 % (ref 0–5)
FHHB: 55.2 % (ref 0–5)
FLUAV SUBTYP SPEC NAA+PROBE: NOT DETECTED
FLUBV RNA ISLT QL NAA+PROBE: NOT DETECTED
GAS FLOW AIRWAY: 15 LPM
GAS FLOW AIRWAY: ABNORMAL L/MIN
GLOBULIN UR ELPH-MCNC: 2.6 GM/DL
GLUCOSE BLDA-MCNC: 144 MG/DL (ref 65–99)
GLUCOSE BLDA-MCNC: 150 MG/DL (ref 65–99)
GLUCOSE BLDC GLUCOMTR-MCNC: 121 MG/DL (ref 70–99)
GLUCOSE BLDC GLUCOMTR-MCNC: 121 MG/DL (ref 70–99)
GLUCOSE BLDC GLUCOMTR-MCNC: 137 MG/DL (ref 70–99)
GLUCOSE BLDC GLUCOMTR-MCNC: 137 MG/DL (ref 70–99)
GLUCOSE BLDC GLUCOMTR-MCNC: 156 MG/DL (ref 70–99)
GLUCOSE BLDC GLUCOMTR-MCNC: 72 MG/DL (ref 70–99)
GLUCOSE BLDC GLUCOMTR-MCNC: 76 MG/DL (ref 70–99)
GLUCOSE BLDC GLUCOMTR-MCNC: 80 MG/DL (ref 70–99)
GLUCOSE BLDC GLUCOMTR-MCNC: 84 MG/DL (ref 70–99)
GLUCOSE BLDC GLUCOMTR-MCNC: 89 MG/DL (ref 70–99)
GLUCOSE SERPL-MCNC: 93 MG/DL (ref 65–99)
HADV DNA SPEC NAA+PROBE: NOT DETECTED
HCO3 BLDA-SCNC: 38.8 MMOL/L (ref 22–26)
HCO3 BLDA-SCNC: 42.9 MMOL/L (ref 22–26)
HCO3 BLDA-SCNC: 43.4 MMOL/L (ref 22–26)
HCO3 BLDV-SCNC: 48.3 MMOL/L (ref 22–26)
HCOV 229E RNA SPEC QL NAA+PROBE: NOT DETECTED
HCOV HKU1 RNA SPEC QL NAA+PROBE: NOT DETECTED
HCOV NL63 RNA SPEC QL NAA+PROBE: NOT DETECTED
HCOV OC43 RNA SPEC QL NAA+PROBE: NOT DETECTED
HCT VFR BLD AUTO: 44.2 % (ref 34–46.6)
HGB BLD-MCNC: 12.8 G/DL (ref 12–15.9)
HGB BLDA-MCNC: 12.5 G/DL (ref 11.7–14.6)
HGB BLDA-MCNC: 12.6 G/DL (ref 11.7–14.6)
HGB BLDA-MCNC: 13.1 G/DL (ref 11.7–14.6)
HGB BLDA-MCNC: 13.7 G/DL (ref 11.7–14.6)
HMPV RNA NPH QL NAA+NON-PROBE: NOT DETECTED
HPIV1 RNA ISLT QL NAA+PROBE: NOT DETECTED
HPIV2 RNA SPEC QL NAA+PROBE: NOT DETECTED
HPIV3 RNA NPH QL NAA+PROBE: NOT DETECTED
HPIV4 P GENE NPH QL NAA+PROBE: NOT DETECTED
IMM GRANULOCYTES # BLD AUTO: 0.05 10*3/MM3 (ref 0–0.05)
IMM GRANULOCYTES NFR BLD AUTO: 0.5 % (ref 0–0.5)
INHALED O2 CONCENTRATION: 100 %
INHALED O2 CONCENTRATION: 100 %
INHALED O2 CONCENTRATION: 70 %
INHALED O2 CONCENTRATION: 80 %
L PNEUMO1 AG UR QL IA: NEGATIVE
LACTATE BLDA-SCNC: 1.46 MMOL/L (ref 0.5–2)
LACTATE BLDA-SCNC: 1.71 MMOL/L (ref 0.5–2)
LYMPHOCYTES # BLD AUTO: 1.68 10*3/MM3 (ref 0.7–3.1)
LYMPHOCYTES NFR BLD AUTO: 18.4 % (ref 19.6–45.3)
M PNEUMO IGG SER IA-ACNC: NOT DETECTED
MAGNESIUM SERPL-MCNC: 1.6 MG/DL (ref 1.6–2.4)
MCH RBC QN AUTO: 30.3 PG (ref 26.6–33)
MCHC RBC AUTO-ENTMCNC: 29 G/DL (ref 31.5–35.7)
MCV RBC AUTO: 104.7 FL (ref 79–97)
METHGB BLD QL: 0.2 % (ref 0–1.5)
METHGB BLD QL: 0.3 % (ref 0–1.5)
MODALITY: ABNORMAL
MONOCYTES # BLD AUTO: 1.12 10*3/MM3 (ref 0.1–0.9)
MONOCYTES NFR BLD AUTO: 12.3 % (ref 5–12)
NEUTROPHILS NFR BLD AUTO: 6.19 10*3/MM3 (ref 1.7–7)
NEUTROPHILS NFR BLD AUTO: 67.7 % (ref 42.7–76)
NOTE: ABNORMAL
NRBC BLD AUTO-RTO: 0 /100 WBC (ref 0–0.2)
OXYHGB MFR BLDV: 44.4 % (ref 94–99)
OXYHGB MFR BLDV: 96 % (ref 94–99)
OXYHGB MFR BLDV: 98 % (ref 94–99)
OXYHGB MFR BLDV: 98.1 % (ref 94–99)
PCO2 BLDA: 66.3 MM HG (ref 35–45)
PCO2 BLDA: 83.5 MM HG (ref 35–45)
PCO2 BLDA: 91.3 MM HG (ref 35–45)
PCO2 BLDV: 117 MM HG (ref 41–51)
PH BLDA: 7.29 PH UNITS (ref 7.35–7.45)
PH BLDA: 7.33 PH UNITS (ref 7.35–7.45)
PH BLDA: 7.38 PH UNITS (ref 7.35–7.45)
PH BLDV: 7.23 PH UNITS (ref 7.31–7.41)
PHOSPHATE SERPL-MCNC: 2.9 MG/DL (ref 2.5–4.5)
PLATELET # BLD AUTO: 164 10*3/MM3 (ref 140–450)
PMV BLD AUTO: 10.9 FL (ref 6–12)
PO2 BLD: 187 MM[HG] (ref 0–500)
PO2 BLD: 200 MM[HG] (ref 0–500)
PO2 BLD: 92 MM[HG] (ref 0–500)
PO2 BLDA: 140.3 MM HG (ref 80–100)
PO2 BLDA: 149.7 MM HG (ref 80–100)
PO2 BLDA: 92.4 MM HG (ref 80–100)
PO2 BLDV: 24.1 MM HG (ref 35–42)
POTASSIUM BLDA-SCNC: 4.25 MMOL/L (ref 3.5–5)
POTASSIUM BLDA-SCNC: 4.38 MMOL/L (ref 3.5–5)
POTASSIUM SERPL-SCNC: 4.9 MMOL/L (ref 3.5–5.2)
PROT SERPL-MCNC: 5.4 G/DL (ref 6–8.5)
RBC # BLD AUTO: 4.22 10*6/MM3 (ref 3.77–5.28)
RHINOVIRUS RNA SPEC NAA+PROBE: NOT DETECTED
RSV RNA NPH QL NAA+NON-PROBE: NOT DETECTED
S PNEUM AG SPEC QL LA: NEGATIVE
SAO2 % BLDCOA: 96.5 % (ref 95–99)
SAO2 % BLDCOA: 98.7 % (ref 95–99)
SAO2 % BLDCOA: 98.8 % (ref 95–99)
SAO2 % BLDCOV: 44.6 % (ref 45–75)
SARS-COV-2 RNA RESP QL NAA+PROBE: NOT DETECTED
SODIUM BLDA-SCNC: 133.6 MMOL/L (ref 136–146)
SODIUM BLDA-SCNC: 134.5 MMOL/L (ref 136–146)
SODIUM SERPL-SCNC: 137 MMOL/L (ref 136–145)
TROPONIN T SERPL HS-MCNC: 136 NG/L
TSH SERPL DL<=0.05 MIU/L-ACNC: 1.96 UIU/ML (ref 0.27–4.2)
WBC NRBC COR # BLD AUTO: 9.14 10*3/MM3 (ref 3.4–10.8)

## 2024-02-17 PROCEDURE — 5A09357 ASSISTANCE WITH RESPIRATORY VENTILATION, LESS THAN 24 CONSECUTIVE HOURS, CONTINUOUS POSITIVE AIRWAY PRESSURE: ICD-10-PCS | Performed by: INTERNAL MEDICINE

## 2024-02-17 PROCEDURE — 84100 ASSAY OF PHOSPHORUS: CPT | Performed by: STUDENT IN AN ORGANIZED HEALTH CARE EDUCATION/TRAINING PROGRAM

## 2024-02-17 PROCEDURE — 83050 HGB METHEMOGLOBIN QUAN: CPT | Performed by: STUDENT IN AN ORGANIZED HEALTH CARE EDUCATION/TRAINING PROGRAM

## 2024-02-17 PROCEDURE — 94799 UNLISTED PULMONARY SVC/PX: CPT

## 2024-02-17 PROCEDURE — 83050 HGB METHEMOGLOBIN QUAN: CPT | Performed by: INTERNAL MEDICINE

## 2024-02-17 PROCEDURE — 82820 HEMOGLOBIN-OXYGEN AFFINITY: CPT | Performed by: INTERNAL MEDICINE

## 2024-02-17 PROCEDURE — 36600 WITHDRAWAL OF ARTERIAL BLOOD: CPT | Performed by: INTERNAL MEDICINE

## 2024-02-17 PROCEDURE — 82805 BLOOD GASES W/O2 SATURATION: CPT | Performed by: INTERNAL MEDICINE

## 2024-02-17 PROCEDURE — 25010000002 MORPHINE PER 10 MG: Performed by: STUDENT IN AN ORGANIZED HEALTH CARE EDUCATION/TRAINING PROGRAM

## 2024-02-17 PROCEDURE — 99291 CRITICAL CARE FIRST HOUR: CPT | Performed by: INTERNAL MEDICINE

## 2024-02-17 PROCEDURE — 85025 COMPLETE CBC W/AUTO DIFF WBC: CPT | Performed by: STUDENT IN AN ORGANIZED HEALTH CARE EDUCATION/TRAINING PROGRAM

## 2024-02-17 PROCEDURE — 0 DEXTROSE 5 % SOLUTION: Performed by: INTERNAL MEDICINE

## 2024-02-17 PROCEDURE — 82948 REAGENT STRIP/BLOOD GLUCOSE: CPT

## 2024-02-17 PROCEDURE — 71045 X-RAY EXAM CHEST 1 VIEW: CPT

## 2024-02-17 PROCEDURE — 25010000002 FUROSEMIDE PER 20 MG: Performed by: INTERNAL MEDICINE

## 2024-02-17 PROCEDURE — 82948 REAGENT STRIP/BLOOD GLUCOSE: CPT | Performed by: EMERGENCY MEDICINE

## 2024-02-17 PROCEDURE — 82948 REAGENT STRIP/BLOOD GLUCOSE: CPT | Performed by: STUDENT IN AN ORGANIZED HEALTH CARE EDUCATION/TRAINING PROGRAM

## 2024-02-17 PROCEDURE — 36600 WITHDRAWAL OF ARTERIAL BLOOD: CPT | Performed by: STUDENT IN AN ORGANIZED HEALTH CARE EDUCATION/TRAINING PROGRAM

## 2024-02-17 PROCEDURE — 93306 TTE W/DOPPLER COMPLETE: CPT | Performed by: INTERNAL MEDICINE

## 2024-02-17 PROCEDURE — 25010000002 HEPARIN (PORCINE) PER 1000 UNITS: Performed by: STUDENT IN AN ORGANIZED HEALTH CARE EDUCATION/TRAINING PROGRAM

## 2024-02-17 PROCEDURE — 0202U NFCT DS 22 TRGT SARS-COV-2: CPT

## 2024-02-17 PROCEDURE — 82375 ASSAY CARBOXYHB QUANT: CPT | Performed by: INTERNAL MEDICINE

## 2024-02-17 PROCEDURE — 25010000002 MORPHINE PER 10 MG: Performed by: INTERNAL MEDICINE

## 2024-02-17 PROCEDURE — 99232 SBSQ HOSP IP/OBS MODERATE 35: CPT | Performed by: INTERNAL MEDICINE

## 2024-02-17 PROCEDURE — 83735 ASSAY OF MAGNESIUM: CPT | Performed by: STUDENT IN AN ORGANIZED HEALTH CARE EDUCATION/TRAINING PROGRAM

## 2024-02-17 PROCEDURE — 94660 CPAP INITIATION&MGMT: CPT

## 2024-02-17 PROCEDURE — 93306 TTE W/DOPPLER COMPLETE: CPT

## 2024-02-17 PROCEDURE — 87449 NOS EACH ORGANISM AG IA: CPT

## 2024-02-17 PROCEDURE — 94761 N-INVAS EAR/PLS OXIMETRY MLT: CPT

## 2024-02-17 PROCEDURE — 80053 COMPREHEN METABOLIC PANEL: CPT | Performed by: STUDENT IN AN ORGANIZED HEALTH CARE EDUCATION/TRAINING PROGRAM

## 2024-02-17 PROCEDURE — 82375 ASSAY CARBOXYHB QUANT: CPT | Performed by: STUDENT IN AN ORGANIZED HEALTH CARE EDUCATION/TRAINING PROGRAM

## 2024-02-17 PROCEDURE — 84484 ASSAY OF TROPONIN QUANT: CPT | Performed by: STUDENT IN AN ORGANIZED HEALTH CARE EDUCATION/TRAINING PROGRAM

## 2024-02-17 PROCEDURE — 25010000002 MEROPENEM PER 100 MG: Performed by: STUDENT IN AN ORGANIZED HEALTH CARE EDUCATION/TRAINING PROGRAM

## 2024-02-17 PROCEDURE — 82805 BLOOD GASES W/O2 SATURATION: CPT | Performed by: STUDENT IN AN ORGANIZED HEALTH CARE EDUCATION/TRAINING PROGRAM

## 2024-02-17 RX ORDER — DEXTROSE MONOHYDRATE 25 G/50ML
INJECTION, SOLUTION INTRAVENOUS
Status: DISPENSED
Start: 2024-02-17 | End: 2024-02-17

## 2024-02-17 RX ORDER — SODIUM CHLORIDE 0.9 % (FLUSH) 0.9 %
10 SYRINGE (ML) INJECTION AS NEEDED
Status: DISCONTINUED | OUTPATIENT
Start: 2024-02-17 | End: 2024-02-22 | Stop reason: HOSPADM

## 2024-02-17 RX ORDER — HEPARIN SODIUM 5000 [USP'U]/ML
5000 INJECTION, SOLUTION INTRAVENOUS; SUBCUTANEOUS EVERY 8 HOURS SCHEDULED
Status: DISCONTINUED | OUTPATIENT
Start: 2024-02-17 | End: 2024-02-22 | Stop reason: HOSPADM

## 2024-02-17 RX ORDER — SODIUM CHLORIDE 0.9 % (FLUSH) 0.9 %
10 SYRINGE (ML) INJECTION EVERY 12 HOURS SCHEDULED
Status: DISCONTINUED | OUTPATIENT
Start: 2024-02-17 | End: 2024-02-22 | Stop reason: HOSPADM

## 2024-02-17 RX ORDER — BUDESONIDE 0.5 MG/2ML
0.5 INHALANT ORAL
Status: DISCONTINUED | OUTPATIENT
Start: 2024-02-17 | End: 2024-02-22 | Stop reason: HOSPADM

## 2024-02-17 RX ORDER — SODIUM CHLORIDE 9 MG/ML
40 INJECTION, SOLUTION INTRAVENOUS AS NEEDED
Status: DISCONTINUED | OUTPATIENT
Start: 2024-02-17 | End: 2024-02-22 | Stop reason: HOSPADM

## 2024-02-17 RX ORDER — DEXTROSE MONOHYDRATE 25 G/50ML
25 INJECTION, SOLUTION INTRAVENOUS ONCE
Status: COMPLETED | OUTPATIENT
Start: 2024-02-17 | End: 2024-02-17

## 2024-02-17 RX ORDER — IBUPROFEN 600 MG/1
1 TABLET ORAL
Status: DISCONTINUED | OUTPATIENT
Start: 2024-02-17 | End: 2024-02-22 | Stop reason: HOSPADM

## 2024-02-17 RX ORDER — MORPHINE SULFATE 2 MG/ML
1 INJECTION, SOLUTION INTRAMUSCULAR; INTRAVENOUS ONCE
Status: COMPLETED | OUTPATIENT
Start: 2024-02-17 | End: 2024-02-17

## 2024-02-17 RX ORDER — FUROSEMIDE 10 MG/ML
40 INJECTION INTRAMUSCULAR; INTRAVENOUS ONCE
Status: COMPLETED | OUTPATIENT
Start: 2024-02-17 | End: 2024-02-17

## 2024-02-17 RX ORDER — MORPHINE SULFATE 2 MG/ML
1 INJECTION, SOLUTION INTRAMUSCULAR; INTRAVENOUS EVERY 4 HOURS PRN
Status: DISCONTINUED | OUTPATIENT
Start: 2024-02-17 | End: 2024-02-19

## 2024-02-17 RX ORDER — DEXTROSE MONOHYDRATE 50 MG/ML
25 INJECTION, SOLUTION INTRAVENOUS CONTINUOUS
Status: DISCONTINUED | OUTPATIENT
Start: 2024-02-17 | End: 2024-02-20

## 2024-02-17 RX ORDER — NITROGLYCERIN 0.4 MG/1
0.4 TABLET SUBLINGUAL
Status: DISCONTINUED | OUTPATIENT
Start: 2024-02-17 | End: 2024-02-22 | Stop reason: HOSPADM

## 2024-02-17 RX ORDER — NICOTINE POLACRILEX 4 MG
15 LOZENGE BUCCAL
Status: DISCONTINUED | OUTPATIENT
Start: 2024-02-17 | End: 2024-02-22 | Stop reason: HOSPADM

## 2024-02-17 RX ORDER — DEXTROSE MONOHYDRATE 25 G/50ML
25 INJECTION, SOLUTION INTRAVENOUS
Status: DISCONTINUED | OUTPATIENT
Start: 2024-02-17 | End: 2024-02-22 | Stop reason: HOSPADM

## 2024-02-17 RX ORDER — ASPIRIN 300 MG/1
300 SUPPOSITORY RECTAL DAILY
Status: DISCONTINUED | OUTPATIENT
Start: 2024-02-17 | End: 2024-02-19

## 2024-02-17 RX ORDER — ARFORMOTEROL TARTRATE 15 UG/2ML
15 SOLUTION RESPIRATORY (INHALATION)
Status: DISCONTINUED | OUTPATIENT
Start: 2024-02-17 | End: 2024-02-22 | Stop reason: HOSPADM

## 2024-02-17 RX ADMIN — HEPARIN SODIUM 5000 UNITS: 5000 INJECTION INTRAVENOUS; SUBCUTANEOUS at 21:38

## 2024-02-17 RX ADMIN — DEXTROSE MONOHYDRATE 25 G: 25 INJECTION, SOLUTION INTRAVENOUS at 06:40

## 2024-02-17 RX ADMIN — DEXTROSE MONOHYDRATE 25 ML/HR: 50 INJECTION, SOLUTION INTRAVENOUS at 15:55

## 2024-02-17 RX ADMIN — MEROPENEM 500 MG: 500 INJECTION, POWDER, FOR SOLUTION INTRAVENOUS at 08:40

## 2024-02-17 RX ADMIN — BUDESONIDE 0.5 MG: 0.5 INHALANT ORAL at 09:27

## 2024-02-17 RX ADMIN — ASPIRIN 300 MG: 300 SUPPOSITORY RECTAL at 14:37

## 2024-02-17 RX ADMIN — FUROSEMIDE 40 MG: 10 INJECTION, SOLUTION INTRAMUSCULAR; INTRAVENOUS at 08:40

## 2024-02-17 RX ADMIN — ARFORMOTEROL TARTRATE 15 MCG: 15 SOLUTION RESPIRATORY (INHALATION) at 09:26

## 2024-02-17 RX ADMIN — DICLOFENAC SODIUM 2 G: 10 GEL TOPICAL at 17:10

## 2024-02-17 RX ADMIN — HEPARIN SODIUM 5000 UNITS: 5000 INJECTION INTRAVENOUS; SUBCUTANEOUS at 06:02

## 2024-02-17 RX ADMIN — MORPHINE SULFATE 1 MG: 2 INJECTION, SOLUTION INTRAMUSCULAR; INTRAVENOUS at 04:46

## 2024-02-17 RX ADMIN — MORPHINE SULFATE 1 MG: 2 INJECTION, SOLUTION INTRAMUSCULAR; INTRAVENOUS at 11:57

## 2024-02-17 RX ADMIN — MEROPENEM 500 MG: 500 INJECTION, POWDER, FOR SOLUTION INTRAVENOUS at 17:09

## 2024-02-17 RX ADMIN — Medication 10 ML: at 02:21

## 2024-02-17 RX ADMIN — ARFORMOTEROL TARTRATE 15 MCG: 15 SOLUTION RESPIRATORY (INHALATION) at 21:40

## 2024-02-17 RX ADMIN — Medication 10 ML: at 08:41

## 2024-02-17 RX ADMIN — MEROPENEM 500 MG: 500 INJECTION, POWDER, FOR SOLUTION INTRAVENOUS at 02:21

## 2024-02-17 RX ADMIN — BUDESONIDE 0.5 MG: 0.5 INHALANT ORAL at 21:40

## 2024-02-17 RX ADMIN — MORPHINE SULFATE 1 MG: 2 INJECTION, SOLUTION INTRAMUSCULAR; INTRAVENOUS at 21:55

## 2024-02-17 RX ADMIN — DEXTROSE MONOHYDRATE 25 G: 25 INJECTION, SOLUTION INTRAVENOUS at 00:19

## 2024-02-17 RX ADMIN — HEPARIN SODIUM 5000 UNITS: 5000 INJECTION INTRAVENOUS; SUBCUTANEOUS at 14:37

## 2024-02-17 RX ADMIN — DICLOFENAC SODIUM 2 G: 10 GEL TOPICAL at 21:38

## 2024-02-17 RX ADMIN — Medication 10 ML: at 21:39

## 2024-02-17 NOTE — ED PROVIDER NOTES
Time: 7:13 PM EST  Date of encounter:  2/16/2024  Independent Historian/Clinical History and Information was obtained by:   Family, son is at bedside  Chief Complaint: Decreased responsiveness    History is limited by: Altered Mental Status    History of Present Illness:  Patient is a 83 y.o. year old female who presents to the emergency department for evaluation of decreased responsiveness.  According to the son at bedside, the patient had a steady decline over the last several weeks.  She is in the nursing home.  The patient has been nonambulatory for at least 6 months.  There has been some intermittent confusion but she is able to have a conversation.  The patient has not been able to feed herself as well.  The son states that the patient's had a significant decrease in response of the last 2 days.  She no longer would respond to verbal stimulus.  Today she is only responsive to physical stimulus.  EMS was called.  Upon their arrival they noted the patient was hypotensive and was hypoxic.  The son notes that she has an advanced directive.  He states that she is a DNR and also would not want to be placed on a ventilator    HPI    Patient Care Team  Primary Care Provider: Brady Rae MD    Past Medical History:     Allergies   Allergen Reactions    Penicillins Unknown - Low Severity     Has tolerated Cephalexin and Ceftriaxone -[facility]     Past Medical History:   Diagnosis Date    Atrial fibrillation     Dementia     pt son    Difficulty walking     Hypokalemia     Hypothyroidism     Idiopathic progressive neuropathy     Muscle weakness     Systemic lupus      Past Surgical History:   Procedure Laterality Date    SPINAL FUSION      provided by pt son     History reviewed. No pertinent family history.    Home Medications:  Prior to Admission medications    Medication Sig Start Date End Date Taking? Authorizing Provider   Acetaminophen--10 MG/10ML liquid Take 10 mL by mouth Every 6 (Six)  "Hours As Needed.    Nilesh Sharp MD   Baclofen (LIORESAL) 5 MG tablet Take 1 tablet by mouth 3 (Three) Times a Day.    Nilesh Sharp MD   gabapentin (NEURONTIN) 800 MG tablet Take 1 tablet by mouth 3 (Three) Times a Day for 2 days. 12/16/23 12/18/23  Annmarie Patricio MD   hydroxychloroquine (PLAQUENIL) 200 MG tablet Take 1 tablet by mouth Daily.    Nilesh Sharp MD   levothyroxine (SYNTHROID, LEVOTHROID) 125 MCG tablet Take 1 tablet by mouth Daily.    Nilesh Sharp MD   metoprolol succinate XL (TOPROL-XL) 25 MG 24 hr tablet Take 0.5 tablets by mouth Every 12 (Twelve) Hours for 30 days. 12/16/23 1/15/24  Annmarie Patricio MD   nystatin (MYCOSTATIN) 846815 UNIT/GM powder Apply 1 application  topically to the appropriate area as directed 2 (Two) Times a Day.    Nilesh Sharp MD   ondansetron (ZOFRAN) 4 MG tablet Take 1 tablet by mouth Every 6 (Six) Hours As Needed for Nausea or Vomiting.    Nilesh Sharp MD   potassium chloride (MICRO-K) 10 MEQ CR capsule Take 1 capsule by mouth Daily.    Nilesh Sharp MD   raloxifene (EVISTA) 60 MG tablet Take 1 tablet by mouth Daily.    Nilesh Sharp MD   rOPINIRole (REQUIP) 0.5 MG tablet Take 1 tablet by mouth Every Night. Take 1 hour before bedtime.    Nilesh Sharp MD   senna 8.6 MG tablet Take 1 tablet by mouth Daily.    Nilesh Sharp MD        Social History:   Social History     Tobacco Use    Smoking status: Never    Smokeless tobacco: Never   Vaping Use    Vaping Use: Never used   Substance Use Topics    Alcohol use: Never    Drug use: Never         Review of Systems:  Review of Systems   Unable to perform ROS: Mental status change        Physical Exam:  /74 (BP Location: Left arm, Patient Position: Lying)   Pulse 96   Temp 98.2 °F (36.8 °C) (Oral)   Resp 20   Ht 160 cm (62.99\")   Wt 75 kg (165 lb 5.5 oz)   SpO2 98%   BMI 29.30 kg/m²     Physical Exam  Vitals and nursing note " reviewed.   Constitutional:       General: She is not in acute distress.     Appearance: Normal appearance. She is ill-appearing. She is not toxic-appearing or diaphoretic.   HENT:      Head: Normocephalic and atraumatic.      Mouth/Throat:      Mouth: Mucous membranes are dry.   Eyes:      Pupils: Pupils are equal, round, and reactive to light.   Cardiovascular:      Rate and Rhythm: Normal rate. Rhythm irregular.      Pulses: Normal pulses.           Carotid pulses are 2+ on the right side and 2+ on the left side.       Radial pulses are 2+ on the right side and 2+ on the left side.        Femoral pulses are 2+ on the right side and 2+ on the left side.       Popliteal pulses are 2+ on the right side and 2+ on the left side.        Dorsalis pedis pulses are 2+ on the right side and 2+ on the left side.        Posterior tibial pulses are 2+ on the right side and 2+ on the left side.      Heart sounds: Normal heart sounds. No murmur heard.  Pulmonary:      Effort: Pulmonary effort is normal. No accessory muscle usage, respiratory distress or retractions.      Breath sounds: Decreased air movement present. Examination of the right-middle field reveals decreased breath sounds and rales. Examination of the left-middle field reveals decreased breath sounds. Examination of the right-lower field reveals decreased breath sounds and rales. Examination of the left-lower field reveals decreased breath sounds and rales. Decreased breath sounds and rales present. No wheezing or rhonchi.   Chest:      Chest wall: No mass or tenderness.   Abdominal:      General: Abdomen is flat. There is no distension.      Palpations: Abdomen is soft. There is no mass or pulsatile mass.      Tenderness: There is no abdominal tenderness. There is no right CVA tenderness, left CVA tenderness, guarding or rebound.      Comments: No rigidity   Musculoskeletal:         General: No swelling, tenderness or deformity.      Cervical back: Neck supple.  No tenderness.      Right lower leg: No edema.      Left lower leg: No edema.      Comments: The patient has severe muscle atrophy bilaterally    It appears that EMS has placed an interosseous IV in the left upper extremity.  It is located superior to the tibial tuberosity   Skin:     General: Skin is warm and dry.      Capillary Refill: Capillary refill takes less than 2 seconds.      Coloration: Skin is pale. Skin is not cyanotic or jaundiced.      Findings: No erythema.   Neurological:      Mental Status: Mental status is at baseline. She is lethargic.      GCS: GCS eye subscore is 4. GCS verbal subscore is 3. GCS motor subscore is 5.      Comments: The patient is unable to cooperate with a neurological exam   Psychiatric:         Cognition and Memory: Cognition is impaired. Memory is impaired.                  Procedures:  Procedures      Medical Decision Making:      Comorbidities that affect care:    Atrial fibrillation, systemic lupus, hypothyroid    External Notes reviewed:    None      The following orders were placed and all results were independently analyzed by me:  Orders Placed This Encounter   Procedures    COVID-19, FLU A/B, RSV PCR 1 HR TAT - Swab, Nasopharynx    Blood Culture - Blood,    Blood Culture - Blood,    Urine Culture - Urine,    S. Pneumo Ag Urine or CSF - Urine, Urine, Clean Catch    Legionella Antigen, Urine - Urine, Urine, Clean Catch    Respiratory Panel PCR w/COVID-19(SARS-CoV-2) ANGELICA/PHI/PATTI/PAD/COR/TK In-House, NP Swab in UTM/VTM, 2 HR TAT - Swab, Nasopharynx    Blood Culture ID, PCR - Blood,    XR Chest 1 View    CT Head Without Contrast    XR Chest 1 View    Imperial Draw    Comprehensive Metabolic Panel    BNP    Single High Sensitivity Troponin T    CBC Auto Differential    Lactic Acid, Plasma    High Sensitivity Troponin T 2Hr    Urinalysis With Culture If Indicated - Urine, Clean Catch    VBG with Co-Ox and Electrolytes    Ammonia    Protime-INR    Procalcitonin    Urinalysis,  Microscopic Only - Urine, Clean Catch    Phosphorus    Magnesium    Comprehensive Metabolic Panel    TSH Rfx On Abnormal To Free T4    High Sensitivity Troponin T    CBC Auto Differential    ABG with Co-Ox and Electrolytes    ABG with Co-Ox and Electrolytes    Blood Gas, Venous -With Co-Ox Panel: Yes    Blood Gas, Arterial -With Co-Ox Panel: Yes    High Sensitivity Troponin T    Basic Metabolic Panel    CBC Auto Differential    High Sensitivity Troponin T 2Hr    NPO Diet NPO Type: Strict NPO    Undress & Gown    Vital Signs    Please perform rectal temperature and notify physician with results  Nursing Communication    Vital Signs    Intake & Output    Weigh Patient    Oral Care    Telemetry - Maintain IV Access    Telemetry - Place Orders & Notify Provider of Results When Patient Experiences Acute Chest Pain, Dysrhythmia or Respiratory Distress    Pulse Oximetry, Continuous    Daily Weights    Strict Intake & Output    Code Status and Medical Interventions:    Inpatient Hospitalist Consult    Inpatient Palliative Care Team Consult    Inpatient Pulmonology Consult    Inpatient Case Management  Consult    Inpatient Nutrition Consult    Oxygen Therapy- Nasal Cannula; Titrate 1-6 LPM Per SpO2; 90 - 95%    Oxygen Therapy- Simple Face Mask; Titrate 5-12 LPM Per SpO2; 90 - 95%    NIPPV (CPAP or BIPAP)    RT to Initiate Bronchopulmonary Hygiene Protocol    SLP Consult: Eval & Treat RN Dysphagia Screen Failed    SLP Consult: Eval & Treat Swallow Disorder    POC Glucose Q1H    POC Glucose Once    POC Glucose Once    POC Glucose STAT    POC Glucose Finger Q2H    POC Glucose Once    POC Glucose Once    POC Glucose Once    ECG 12 Lead ED Triage Standing Order; SOA    Adult Transthoracic Echo Complete W/ Cont if Necessary Per Protocol    Wound Ostomy Eval & Treat    Insert Peripheral IV    Insert Peripheral IV    Inpatient Admission    Fall Precautions    CBC & Differential    Green Top (Gel)    Lavender Top     Gold Top - SST    Light Blue Top    CBC & Differential    CBC & Differential       Medications Given in the Emergency Department:  Medications   sodium chloride 0.9 % flush 10 mL (has no administration in time range)   sodium chloride 0.9 % flush 10 mL (10 mL Intravenous Given 2/17/24 2139)   sodium chloride 0.9 % flush 10 mL (has no administration in time range)   sodium chloride 0.9 % infusion 40 mL (has no administration in time range)   heparin (porcine) 5000 UNIT/ML injection 5,000 Units (5,000 Units Subcutaneous Given 2/17/24 2138)   nitroglycerin (NITROSTAT) SL tablet 0.4 mg (has no administration in time range)   Pharmacy to Dose meropenem (MERREM) (has no administration in time range)   meropenem (MERREM) 500 mg/100 mL 0.9% NS IVPB (mbp) (500 mg Intravenous New Bag 2/18/24 0006)   dextrose (GLUTOSE) oral gel 15 g (has no administration in time range)   dextrose (D50W) (25 g/50 mL) IV injection 25 g (has no administration in time range)   glucagon (GLUCAGEN) injection 1 mg (has no administration in time range)   dextrose (D50W) 50 % (25 g/50 mL) IV injection  - ADS Override Pull (  Not Given 2/17/24 0838)   budesonide (PULMICORT) nebulizer solution 0.5 mg (0.5 mg Nebulization Given 2/17/24 2140)   arformoterol (BROVANA) nebulizer solution 15 mcg (15 mcg Nebulization Given 2/17/24 2140)   morphine injection 1 mg (1 mg Intravenous Given 2/18/24 0139)   aspirin suppository 300 mg (300 mg Rectal Given 2/17/24 1437)   Diclofenac Sodium (VOLTAREN) 1 % gel 2 g (2 g Topical Given 2/17/24 2138)   dextrose (D5W) 5 % infusion (25 mL/hr Intravenous New Bag 2/17/24 1555)   furosemide (LASIX) injection 40 mg (40 mg Intravenous Given 2/16/24 1955)   insulin regular (humuLIN R,novoLIN R) injection 10 Units (10 Units Intravenous Given 2/16/24 2114)   dextrose (D50W) (25 g/50 mL) IV injection 25 g (25 g Intravenous Given 2/16/24 2113)   cefTRIAXone (ROCEPHIN) 1000 mg/100 mL 0.9% NS (MBP) (0 mg Intravenous Stopped 2/16/24  2225)   dextrose (D50W) (25 g/50 mL) IV injection 25 g (25 g Intravenous Given 2/17/24 0019)   meropenem (MERREM) 500 mg/100 mL 0.9% NS IVPB (mbp) (500 mg Intravenous New Bag 2/17/24 0221)   morphine injection 1 mg (1 mg Intravenous Given 2/17/24 0446)   dextrose (D50W) (25 g/50 mL) IV injection 25 g (25 g Intravenous Given 2/17/24 0640)   furosemide (LASIX) injection 40 mg (40 mg Intravenous Given 2/17/24 0840)        ED Course:    ED Course as of 02/18/24 0534   Fri Feb 16, 2024 1813 EKG:    Rhythm: Sinus rhythm  Rate: 68  Incomplete left bundle branch block  LVH by criteria in aVL  Intervals: Normal MD and QT interval  T-wave: Inverted T waves in I and aVL  ST Segment: ST segment elevation V1, V2, V3, thought to be related to LVH    EKG Comparison: No change in the QRS and ST morphology from EKG that was performed December 13, 2023.  The ST changes in the precordial leads are present as well    Interpreted by me   [SD]      ED Course User Index  [SD] Thuan Magana DO       Labs:    Lab Results (last 24 hours)       Procedure Component Value Units Date/Time    POC Glucose Finger Q2H [[id number]]  (Normal) Collected: 02/17/24 0622    Specimen: Blood from Finger Updated: 02/17/24 0629     Glucose 84 mg/dL      Comment: Serial Number: [id number]:  [id number]       Phosphorus [[id number]]  (Normal) Collected: 02/17/24 0635    Specimen: Blood Updated: 02/17/24 0719     Phosphorus 2.9 mg/dL     Magnesium [[id number]]  (Normal) Collected: 02/17/24 0635    Specimen: Blood Updated: 02/17/24 0719     Magnesium 1.6 mg/dL     Comprehensive Metabolic Panel [[id number]]  (Abnormal) Collected: 02/17/24 0635    Specimen: Blood Updated: 02/17/24 0719     Glucose 93 mg/dL      BUN 28 mg/dL      Creatinine 0.85 mg/dL      Sodium 137 mmol/L      Potassium 4.9 mmol/L      Chloride 93 mmol/L      CO2 38.1 mmol/L      Calcium 9.4 mg/dL      Total Protein 5.4 g/dL      Albumin 2.8 g/dL      ALT (SGPT) 12 U/L      AST (SGOT) 20 U/L       Alkaline Phosphatase 58 U/L      Total Bilirubin 0.4 mg/dL      Globulin 2.6 gm/dL      A/G Ratio 1.1 g/dL      BUN/Creatinine Ratio 32.9     Anion Gap 5.9 mmol/L      eGFR 67.2 mL/min/1.73     Narrative:      GFR Normal >60  Chronic Kidney Disease <60  Kidney Failure <15    The GFR formula is only valid for adults with stable renal function between ages 18 and 70.    CBC & Differential [[id number]]  (Abnormal) Collected: 02/17/24 0635    Specimen: Blood Updated: 02/17/24 0649    Narrative:      The following orders were created for panel order CBC & Differential.  Procedure                               Abnormality         Status                     ---------                               -----------         ------                     CBC Auto Differential[[id number]]        Abnormal            Final result                 Please view results for these tests on the individual orders.    High Sensitivity Troponin T [[id number]]  (Abnormal) Collected: 02/17/24 0635    Specimen: Blood Updated: 02/17/24 0818     HS Troponin T 136 ng/L     Narrative:      High Sensitive Troponin T Reference Range:  <14.0 ng/L- Negative Female for AMI  <22.0 ng/L- Negative Male for AMI  >=14 - Abnormal Female indicating possible myocardial injury.  >=22 - Abnormal Male indicating possible myocardial injury.   Clinicians would have to utilize clinical acumen, EKG, Troponin, and serial changes to determine if it is an Acute Myocardial Infarction or myocardial injury due to an underlying chronic condition.         CBC Auto Differential [[id number]]  (Abnormal) Collected: 02/17/24 0635    Specimen: Blood Updated: 02/17/24 0649     WBC 9.14 10*3/mm3      RBC 4.22 10*6/mm3      Hemoglobin 12.8 g/dL      Hematocrit 44.2 %      .7 fL      MCH 30.3 pg      MCHC 29.0 g/dL      RDW 14.0 %      RDW-SD 53.9 fl      MPV 10.9 fL      Platelets 164 10*3/mm3      Neutrophil % 67.7 %      Lymphocyte % 18.4 %      Monocyte % 12.3 %      Eosinophil %  0.8 %      Basophil % 0.3 %      Immature Grans % 0.5 %      Neutrophils, Absolute 6.19 10*3/mm3      Lymphocytes, Absolute 1.68 10*3/mm3      Monocytes, Absolute 1.12 10*3/mm3      Eosinophils, Absolute 0.07 10*3/mm3      Basophils, Absolute 0.03 10*3/mm3      Immature Grans, Absolute 0.05 10*3/mm3      nRBC 0.0 /100 WBC     Blood Gas, Venous -With Co-Ox Panel: Yes [[id number]]  (Abnormal) Collected: 02/17/24 0701    Specimen: Venous Blood Updated: 02/17/24 1014     pH, Venous 7.234 pH Units      pCO2, Venous 117.0 mm Hg      pO2, Venous 24.1 mm Hg      HCO3, Venous 48.3 mmol/L      Base Excess, Venous 15.9 mmol/L      O2 Saturation, Venous 44.6 %      Hemoglobin, Blood Gas 12.6 g/dL      Carboxyhemoglobin 0.1 %      Methemoglobin 0.30 %      Oxyhemoglobin 44.4 %      FHHB 55.2 %      Note 14/7 rate 20     Site Arterial: right radial     Modality BiPap     FIO2 100 %     POC Glucose Once [[id number]]  (Abnormal) Collected: 02/17/24 0730    Specimen: Blood Updated: 02/17/24 0735     Glucose 156 mg/dL      Comment: Serial Number: [id number]:  [id number]       ABG with Co-Ox and Electrolytes [[id number]]  (Abnormal) Collected: 02/17/24 0931    Specimen: Arterial Blood from Arm, Right Updated: 02/17/24 0936     pH, Arterial 7.334 pH units      pCO2, Arterial 83.5 mm Hg      pO2, Arterial 149.7 mm Hg      HCO3, Arterial 43.4 mmol/L      Base Excess, Arterial 13.6 mmol/L      O2 Saturation, Arterial 98.8 %      Hemoglobin, Blood Gas 13.7 g/dL      Carboxyhemoglobin 0.4 %      Methemoglobin 0.30 %      Oxyhemoglobin 98.1 %      FHHB 1.2 %      Fracisco's Test --     Note 20/8, rr16, 80%     Site Arterial: right radial     Modality BiPap     FIO2 80 %      Flow Rate --     Sodium, Arterial 134.5 mmol/L      Potassium, Arterial 4.25 mmol/L      Ionized Calcium, Arterial 1.20 mmol/L      Chloride, Arterial 91 mmol/L      Glucose, Arterial 144 mg/dL      Lactate, Arterial 1.46 mmol/L      PO2/FIO2 187    POC Glucose Finger  Q2H [[id number]]  (Abnormal) Collected: 02/17/24 1038    Specimen: Blood from Finger Updated: 02/17/24 1042     Glucose 121 mg/dL      Comment: Serial Number: [id number]:  [id number]       S. Pneumo Ag Urine or CSF - Urine, Urine, Clean Catch [[id number]]  (Normal) Collected: 02/17/24 1210    Specimen: Urine, Clean Catch Updated: 02/17/24 1228     Strep Pneumo Ag Negative    Legionella Antigen, Urine - Urine, Urine, Clean Catch [[id number]]  (Normal) Collected: 02/17/24 1210    Specimen: Urine, Clean Catch Updated: 02/17/24 1229     LEGIONELLA ANTIGEN, URINE Negative    Respiratory Panel PCR w/COVID-19(SARS-CoV-2) ANGELICA/PHI/PATTI/PAD/COR/TK In-House, NP Swab in UTM/VTM, 2 HR TAT - Swab, Nasopharynx [[id number]]  (Normal) Collected: 02/17/24 1211    Specimen: Swab from Nasopharynx Updated: 02/17/24 1304     ADENOVIRUS, PCR Not Detected     Coronavirus 229E Not Detected     Coronavirus HKU1 Not Detected     Coronavirus NL63 Not Detected     Coronavirus OC43 Not Detected     COVID19 Not Detected     Human Metapneumovirus Not Detected     Human Rhinovirus/Enterovirus Not Detected     Influenza A PCR Not Detected     Influenza B PCR Not Detected     Parainfluenza Virus 1 Not Detected     Parainfluenza Virus 2 Not Detected     Parainfluenza Virus 3 Not Detected     Parainfluenza Virus 4 Not Detected     RSV, PCR Not Detected     Bordetella pertussis pcr Not Detected     Bordetella parapertussis PCR Not Detected     Chlamydophila pneumoniae PCR Not Detected     Mycoplasma pneumo by PCR Not Detected    Narrative:      In the setting of a positive respiratory panel with a viral infection PLUS a negative procalcitonin without other underlying concern for bacterial infection, consider observing off antibiotics or discontinuation of antibiotics and continue supportive care. If the respiratory panel is positive for atypical bacterial infection (Bordetella pertussis, Chlamydophila pneumoniae, or Mycoplasma pneumoniae), consider  antibiotic de-escalation to target atypical bacterial infection.    POC Glucose Finger Q2H [[id number]]  (Abnormal) Collected: 02/17/24 1256    Specimen: Blood from Finger Updated: 02/17/24 1259     Glucose 121 mg/dL      Comment: Serial Number: [id number]:  [id number]       POC Glucose Finger Q2H [[id number]]  (Normal) Collected: 02/17/24 1443    Specimen: Blood from Finger Updated: 02/17/24 1445     Glucose 89 mg/dL      Comment: Serial Number: [id number]:  [id number]       Blood Gas, Arterial -With Co-Ox Panel: Yes [[id number]]  (Abnormal) Collected: 02/17/24 1444    Specimen: Arterial Blood Updated: 02/17/24 1448     pH, Arterial 7.385 pH units      pCO2, Arterial 66.3 mm Hg      pO2, Arterial 140.3 mm Hg      HCO3, Arterial 38.8 mmol/L      Base Excess, Arterial 11.1 mmol/L      O2 Saturation, Arterial 98.7 %      Hemoglobin, Blood Gas 13.1 g/dL      Carboxyhemoglobin 0.4 %      Methemoglobin 0.30 %      Oxyhemoglobin 98.0 %      FHHB 1.3 %      Site Arterial: right radial     Modality BiPap     FIO2 70 %      PO2/FIO2 200     Fracisco's Test Positive     Note 20/8 R16    POC Glucose Once [[id number]]  (Normal) Collected: 02/17/24 1747    Specimen: Blood Updated: 02/17/24 1749     Glucose 76 mg/dL      Comment: Serial Number: [id number]:  [id number]       POC Glucose Once [[id number]]  (Normal) Collected: 02/17/24 2137    Specimen: Blood Updated: 02/17/24 2150     Glucose 80 mg/dL      Comment: Serial Number: [id number]:  [id number]       POC Glucose Finger Q2H [[id number]]  (Normal) Collected: 02/18/24 0008    Specimen: Blood from Finger Updated: 02/18/24 0010     Glucose 86 mg/dL      Comment: Serial Number: [id number]:  [id number]       High Sensitivity Troponin T [[id number]]  (Abnormal) Collected: 02/18/24 0406    Specimen: Blood from Hand, Left Updated: 02/18/24 0517     HS Troponin T 155 ng/L     Narrative:      High Sensitive Troponin T Reference Range:  <14.0 ng/L- Negative Female  for AMI  <22.0 ng/L- Negative Male for AMI  >=14 - Abnormal Female indicating possible myocardial injury.  >=22 - Abnormal Male indicating possible myocardial injury.   Clinicians would have to utilize clinical acumen, EKG, Troponin, and serial changes to determine if it is an Acute Myocardial Infarction or myocardial injury due to an underlying chronic condition.         Basic Metabolic Panel [[id number]]  (Abnormal) Collected: 02/18/24 0406    Specimen: Blood from Hand, Left Updated: 02/18/24 0453     Glucose 75 mg/dL      BUN 25 mg/dL      Creatinine 0.77 mg/dL      Sodium 136 mmol/L      Potassium 4.0 mmol/L      Chloride 91 mmol/L      CO2 41.6 mmol/L      Calcium 8.8 mg/dL      BUN/Creatinine Ratio 32.5     Anion Gap 3.4 mmol/L      eGFR 75.7 mL/min/1.73     Narrative:      GFR Normal >60  Chronic Kidney Disease <60  Kidney Failure <15    The GFR formula is only valid for adults with stable renal function between ages 18 and 70.    CBC & Differential [[id number]]  (Abnormal) Collected: 02/18/24 0406    Specimen: Blood from Hand, Left Updated: 02/18/24 0437    Narrative:      The following orders were created for panel order CBC & Differential.  Procedure                               Abnormality         Status                     ---------                               -----------         ------                     CBC Auto Differential[[id number]]        Abnormal            Final result                 Please view results for these tests on the individual orders.    CBC Auto Differential [[id number]]  (Abnormal) Collected: 02/18/24 0406    Specimen: Blood from Hand, Left Updated: 02/18/24 0437     WBC 7.33 10*3/mm3      RBC 3.92 10*6/mm3      Hemoglobin 11.6 g/dL      Hematocrit 39.7 %      .3 fL      MCH 29.6 pg      MCHC 29.2 g/dL      RDW 14.0 %      RDW-SD 52.7 fl      MPV 11.0 fL      Platelets 159 10*3/mm3      Neutrophil % 64.3 %      Lymphocyte % 21.4 %      Monocyte % 12.0 %      Eosinophil % 1.5  %      Basophil % 0.4 %      Immature Grans % 0.4 %      Neutrophils, Absolute 4.71 10*3/mm3      Lymphocytes, Absolute 1.57 10*3/mm3      Monocytes, Absolute 0.88 10*3/mm3      Eosinophils, Absolute 0.11 10*3/mm3      Basophils, Absolute 0.03 10*3/mm3      Immature Grans, Absolute 0.03 10*3/mm3      nRBC 0.0 /100 WBC              Imaging:    Adult Transthoracic Echo Complete W/ Cont if Necessary Per Protocol    Result Date: 2/17/2024    Left ventricular ejection fraction appears to be 56 - 60%.   Left ventricular wall thickness is consistent with septal asymmetric hypertrophy.   Left ventricular diastolic function is consistent with (grade I) impaired relaxation and age.   The right ventricular cavity is mildly dilated.   Mild to moderate tricuspid regurgitation.     XR Chest 1 View    Result Date: 2/17/2024  PROCEDURE: XR CHEST 1 VW  COMPARISON: [facility], CT, CT CHEST W CONTRAST DIAGNOSTIC, 12/12/2023, 16:52.  [facility], CR, XR CHEST 1 VW, 2/16/2024, 18:25.  INDICATIONS: hypoxia  FINDINGS:  There is mild-to-moderate bilateral perihilar and basilar airspace opacity.  Finding appears similar to the previous exam.  Small bilateral pleural effusions are not excluded.  The cardiac and mediastinal silhouettes appear normal.        1. Mild-to-moderate bilateral perihilar and basilar airspace opacity suspected to represent pulmonary edema.  Superimposed pneumonia is not excluded 2. Suspected small bilateral pleural effusions       Tavo Law M.D.       Electronically Signed and Approved By: Tavo Law M.D. on 2/17/2024 at 8:14                Differential Diagnosis and Discussion:    Altered Mental Status: Based on the patient's signs and symptoms, differential diagnosis includes but is not limited to meningitis, stroke, sepsis, subarachnoid hemorrhage, intracranial bleeding, encephalitis, and metabolic encephalopathy.    All labs were reviewed and interpreted by me.  All X-rays  impressions were independently interpreted by me.  EKG was interpreted by me.  CT scan radiology impression was interpreted by me.    MDM  Number of Diagnoses or Management Options  Acute pulmonary edema  Acute respiratory failure with hypoxia  Elevated troponin  Hyperkalemia  Urinary tract infection without hematuria, site unspecified  Diagnosis management comments:     Sepsis was not present in the emergency department or on arrival. This is supported as the patient does not either meet two out of the four SIRS criteria or has an obvious bacterial infection.      SIRS criteria considered:   1.                     Temperature > 100.4 or <98.6    2.                     Heart Rate > 90    3.                     Respiratory Rate > 22    4.                     WBC > 12K or <4K.    The patient's CBC was reviewed and shows no abnormalities of critical concern.  Of note, there is no anemia requiring a blood transfusion and the platelet count is acceptable    Patient's chemistry demonstrates a BUN of 27, patient normal creatinine.  The patient's potassium was slightly elevated at 5.8.  I performed a venous blood gas.  The patient's venous blood gas was elevated on that as well.  Patient's hyperkalemia was treated with IV insulin and glucose.  The patient could not be given Lokelma because of aspiration.  Patient had no EKG changes consistent hyperkalemia and thus the patient was not given bicarb or calcium gluconate.  The patient had normal liver enzymes.    Patient's ammonia level was normal.    Patient had a normal procalcitonin.    Patient's urinalysis demonstrated 3 6 Remus epithelial cells.  However, and had 4+ bacteria and 21-50 BCs.  I do feel the patient had a UTI.  The patient was treated with Rocephin.    2 blood cultures were ordered.  The results are pending at the time of disposition    The patient's Covid swab was negative   The patient's Influenza swab was negative     The patient's first troponin was  elevated at 147.  His repeated 2 hours later and came back at 146.  This is a negative delta of 1.  It does not appear the patient does have an acute active myocardial infarction.    CT of the brain demonstrated no acute findings.  There was opacification of the mastoid air cells and middle ear cavities    Chest demonstrated cardiomegaly and mild pulmonary edema.    The chest x-ray is consistent with the patient's elevated BNP of over 19,000.  The patient was treated with IV Lasix.       Amount and/or Complexity of Data Reviewed  Clinical lab tests: reviewed  Tests in the radiology section of CPT®: reviewed  Tests in the medicine section of CPT®: reviewed  Decide to obtain previous medical records or to obtain history from someone other than the patient: yes  Discuss the patient with other providers: yes (23:05 EST  I discussed the case with the hospitalist.  We have discussed the patient's presenting symptoms, laboratory values, imaging and condition at the time of admission.  They will evaluate the patient in the emergency room and admit the patient to the hospital)             Social Determinants of Health:    Patient is a nursing home/assisted living resident and has reliable access to care.      Disposition and Care Coordination:    Admit:   Through independent evaluation of the patient's history, physical, and imperical data, the patient meets criteria for inpatient admission to the hospital.        Final diagnoses:   Acute respiratory failure with hypoxia   Acute pulmonary edema   Urinary tract infection without hematuria, site unspecified   Hyperkalemia   Elevated troponin        ED Disposition       ED Disposition   Decision to Admit    Condition   --    Comment   Level of Care: Telemetry [5]   Diagnosis: Acute metabolic encephalopathy [[id number]]   Certification: I Certify That Inpatient Hospital Services Are Medically Necessary For Greater Than 2 Midnights                 This medical record created using  voice recognition software.             Thuan Magana DO  02/18/24 0588

## 2024-02-17 NOTE — H&P
[facility] HISTORY AND PHYSICAL  Date: 2024   Patient Name: Marylou Levin  : 1940  MRN: [id number]  Primary Care Physician:  Brady Rae MD  Date of admission: 2024    Subjective   Subjective     Chief Complaint: Decreased responsiveness    HPI:    Mraylou Levin is a 83 y.o. female  with a past medical history of dementia, hypothyroidism, SLE, dementia, presented to the ED for evaluation of decreased responsiveness.  As per the reports present to the ED physician, patient has a steady decline over the last several weeks.  Currently staying at a nursing facility.  Patient has been nonambulatory in the past 6 months.  Usually patient noted to have some intermittent confusion but able to have conversation.  For the last 2 days patient has significant decreased in response.  Only responding to the physical symptoms.  EMS was called.  Patient noted to be hypotensive and hypoxic, started on supplemental oxygen.  Patient is unable to provide any history at this time.  Intermittently responsive.  Incoherent speech.    In the ED, vital signs temperature 91.9, pulse 79, respiratory 26, blood pressure 90s over 60s, on nonrebreather 15 L saturating around 90%.  Labs initial troponin 141, repeat troponin 146, proBNP 1 1998, VBG showed 7.25/88, sodium 136, potassium 5.8, chloride 92, bicarb 41.7, BUN 27, creatinine 0.83, rest of the CMP with no significant findings, normal ammonia, normal lactic acid, procalcitonin 0.04, urinalysis showed 21-50 WBC, 4+ bacteria, blood cultures and urine cultures in process.  COVID flu RSV negative.  Chest x-ray showed slight increase in the effusion and consolidation in the right lung bases compared to the chest x-ray on 2023.  CT head without contrast showed no acute findings.  Opacification of the mastoid air cells and middle ear cavities may reflect otomastoiditis.  Received ceftriaxone in the ED.  Patient has been admitted for  further evaluation and management of acute metabolic encephalopathy, acute hypoxic and hypercapnic respiratory failure, UTI, hyperkalemia.      Personal History     Past Medical History:   Diagnosis Date    Atrial fibrillation     Difficulty walking     Hypokalemia     Hypothyroidism     Idiopathic progressive neuropathy     Muscle weakness     Systemic lupus          No past surgical history on file.      No family history on file.      Social History     Socioeconomic History    Marital status:    Tobacco Use    Smoking status: Never    Smokeless tobacco: Never   Vaping Use    Vaping Use: Never used   Substance and Sexual Activity    Alcohol use: Never    Drug use: Never    Sexual activity: Defer         Home Medications:  Acetaminophen-DM, Baclofen, gabapentin, hydroxychloroquine, levothyroxine, metoprolol succinate XL, nystatin, ondansetron, potassium chloride, rOPINIRole, raloxifene, and senna    Allergies:  Allergies   Allergen Reactions    Penicillins Unknown - Low Severity     Has tolerated Cephalexin and Ceftriaxone -Tomás Cheek RPH       Review of Systems   Unable to obtain any history from the patient due to altered mental status    Objective   Objective     Vitals:   Temp:  [97.9 °F (36.6 °C)] 97.9 °F (36.6 °C)  Heart Rate:  [61-83] 61  Resp:  [26] 26  BP: ()/(36-99) 91/60  Flow (L/min):  [15] 15    Physical Exam    Constitutional: Somnolent, disoriented   Eyes: Pupils equal, sclerae anicteric, no conjunctival injection   HENT: NCAT, mucous membranes dry   Respiratory: Bilateral air entry present, decreased breath sounds on the right lower lobe region, on nonrebreather   Cardiovascular: RRR, no murmurs   Gastrointestinal: Positive bowel sounds, soft, nontender, nondistended   Musculoskeletal: No bilateral ankle edema, no clubbing or cyanosis to extremities   Neurologic: Disoriented, incoherent speech, unable to follow commands for neurological examination    Result Review    Result  Review:  I have personally reviewed the results from the time of this admission to 2/17/2024 01:06 EST and agree with these findings:  [x]  Laboratory  []  Microbiology  [x]  Radiology  [x]  EKG/Telemetry   []  Cardiology/Vascular   []  Pathology  []  Old records  []  Other:        Imaging Results (Last 24 Hours)       Procedure Component Value Units Date/Time    CT Head Without Contrast [[id number]] Collected: 02/16/24 2114     Updated: 02/16/24 2221    Narrative:      PROCEDURE: CT HEAD WO CONTRAST     COMPARISON:  [facility], CT, HEAD W/O CONTRAST, 12/31/2019, 18:23.  INDICATIONS: headache.  Mental status change.     PROTOCOL:   Standard imaging protocol performed      RADIATION:   DLP: 1017.2 mGy*cm    MA and/or KV was adjusted to minimize radiation dose.          TECHNIQUE: After obtaining the patient's consent, CT images were obtained without non-ionic   intravenous contrast material.      FINDINGS:   Ventricular size and configuration are within normal limits.  There is age-appropriate generalized   atrophy.  No acute infarct or hemorrhage is identified.  There are no masses.  There is no skull   fracture.  There is opacification of the mastoid air cells and middle ear cavities on both sides   which may reflect otomastoiditis.  Bilateral hearing aids are present.       Impression:       1. No acute findings in the brain.  2. Opacification of the mastoid air cells and middle ear cavities may reflect otomastoiditis.              SIMON MCDONNELL MD         Electronically Signed and Approved By: SIMON MCDONNELL MD on 2/16/2024 at 22:17                     XR Chest 1 View [[id number]] Collected: 02/16/24 1824     Updated: 02/16/24 1827    Narrative:      PROCEDURE: XR CHEST 1 VW     COMPARISON: [facility], CR, XR CHEST 1 VW, 12/12/2023, 15:09.  [facility], CT, CT CHEST W CONTRAST DIAGNOSTIC, 12/12/2023, 16:52.     INDICATIONS: SOA     FINDINGS:      There is mild  cardiomegaly.  There appears to be mild pulmonary edema.  Stable elevation of the   right hemidiaphragm.  Probable slight increase in the effusion and consolidation in the lung bases.     IMPRESSION:  Slight increase in the effusion and consolidation in the lung bases.       KOBY WOODARD MD         Electronically Signed and Approved By: KOBY WOODARD MD on 2/16/2024 at 18:23                                    Assessment & Plan   Assessment / Plan     Assessment/Plan:   Acute metabolic encephalopathy DDX: UTI, worsening dementia, hypercapnia  Acute hypoxic and hypercapnic respiratory failure  UTI  Hyperkalemia  Elevated troponin likely NSTEMI type II from hypoxia, CHF exacerbation  Mild pulmonary edema  Likely CHF exacerbation  History of dementia  History of hypothyroidism  History of SLE  History of grade 1 diastolic dysfunction  Mild to moderate mitral valve regurgitation      Plan  Admit to inpatient, telemetry  Follow-up on blood cultures, urine cultures  Patient has history of ESBL in 2019, received ceftriaxone in the ED.  Will switch to meropenem.  De-escalate antibiotics based on the culture data  Repeat ABG in the a.m.  Continue nonrebreather, wean oxygen as tolerated  Pulmonology consult in a.m.  Received insulin, Lasix in the ED.  No hyperkalemic EKG changes.  Follow potassium levels with a.m. labs  Follow-up on TSH, T4 levels  Follow-up on repeat troponin levels in the a.m. if trending up, consult cardiology  Cardiac echo  Reassess and redose Lasix in the a.m.  Monitor urine output  N.p.o.  POCT glucose every 2 hours  Palliative care consult in the a.m.    DVT prophylaxis:       CODE STATUS:    Medical Intervention Limits: NO intubation (DNI)  Level Of Support Discussed With: Health Care Surrogate  Code Status (Patient has no pulse and is not breathing): No CPR (Do Not Attempt to Resuscitate)  Medical Interventions (Patient has pulse or is breathing): Limited Support      Admission Status:  I  believe this patient meets inpatient status.    Part of this note may be an electronic transcription/translation of spoken language to printed text using the Dragon Dictation System    Delano Crystal MD

## 2024-02-17 NOTE — PAYOR COMM NOTE
"Marylou Levin (83 y.o. Female)       Date of Birth   1940    Social Security Number       Address   [address] [place] [address]    Home Phone   [phone]    MRN   [id number]       Sabianist   Cheondoism    Marital Status                               Admission Date   24    Admission Type   Emergency    Admitting Provider   eDlano Crystal MD    Attending Provider   Jimi Lerma MD    Department, Room/Bed   [facility] 5TH FLOOR SURGICAL TELEMETRY UNIT, 519/       Discharge Date       Discharge Disposition       Discharge Destination                                 Attending Provider: Jimi Lerma MD    Allergies: Penicillins    Isolation: None   Infection: COVID (rule out) (24)   Code Status: No CPR    Ht: 160 cm (62.99\")   Wt: 75 kg (165 lb 5.5 oz)    Admission Cmt: None   Principal Problem: Acute metabolic encephalopathy [G93.41]                   Active Insurance as of 2024       Primary Coverage       Payor Plan Insurance Group Employer/Plan Group    SIGNATURE MEDICARE ADVANTAGE SIGNATURE ADVANTAGE [id number]       Payor Plan Address Payor Plan Phone Number Payor Plan Fax Number Effective Dates    [address] [address] [phone]  2021 - None Entered    [facility] [id number]         Subscriber Name Subscriber Birth Date Member ID       MARYLOU LEVIN 1940 [id number]                     Emergency Contacts        (Rel.) Home Phone Work Phone Mobile Phone    DYLAN LEVIN (Son) -- -- [phone]                 History & Physical        Delano Crystal MD at 24 [address] [place] [address] HISTORY AND PHYSICAL  Date: 2024   Patient Name: Marylou Levin  : 1940  MRN: [id number]  Primary Care Physician:  Brady Rae MD  Date of admission: 2024    Subjective  Subjective     Chief Complaint: Decreased responsiveness    HPI:    Marylou Levin is a 83 y.o. female  " with a past medical history of dementia, hypothyroidism, SLE, dementia, presented to the ED for evaluation of decreased responsiveness.  As per the reports present to the ED physician, patient has a steady decline over the last several weeks.  Currently staying at a nursing facility.  Patient has been nonambulatory in the past 6 months.  Usually patient noted to have some intermittent confusion but able to have conversation.  For the last 2 days patient has significant decreased in response.  Only responding to the physical symptoms.  EMS was called.  Patient noted to be hypotensive and hypoxic, started on supplemental oxygen.  Patient is unable to provide any history at this time.  Intermittently responsive.  Incoherent speech.    In the ED, vital signs temperature 91.9, pulse 79, respiratory 26, blood pressure 90s over 60s, on nonrebreather 15 L saturating around 90%.  Labs initial troponin 141, repeat troponin 146, proBNP 1 9/5/1998, VBG showed 7.25/88, sodium 136, potassium 5.8, chloride 92, bicarb 41.7, BUN 27, creatinine 0.83, rest of the CMP with no significant findings, normal ammonia, normal lactic acid, procalcitonin 0.04, urinalysis showed 21-50 WBC, 4+ bacteria, blood cultures and urine cultures in process.  COVID flu RSV negative.  Chest x-ray showed slight increase in the effusion and consolidation in the right lung bases compared to the chest x-ray on 12/12/2023.  CT head without contrast showed no acute findings.  Opacification of the mastoid air cells and middle ear cavities may reflect otomastoiditis.  Received ceftriaxone in the ED.  Patient has been admitted for further evaluation and management of acute metabolic encephalopathy, acute hypoxic and hypercapnic respiratory failure, UTI, hyperkalemia.      Personal History     Past Medical History:   Diagnosis Date    Atrial fibrillation     Difficulty walking     Hypokalemia     Hypothyroidism     Idiopathic progressive neuropathy     Muscle  weakness     Systemic lupus          No past surgical history on file.      No family history on file.      Social History     Socioeconomic History    Marital status:    Tobacco Use    Smoking status: Never    Smokeless tobacco: Never   Vaping Use    Vaping Use: Never used   Substance and Sexual Activity    Alcohol use: Never    Drug use: Never    Sexual activity: Defer         Home Medications:  Acetaminophen-DM, Baclofen, gabapentin, hydroxychloroquine, levothyroxine, metoprolol succinate XL, nystatin, ondansetron, potassium chloride, rOPINIRole, raloxifene, and senna    Allergies:  Allergies   Allergen Reactions    Penicillins Unknown - Low Severity     Has tolerated Cephalexin and Ceftriaxone -[facility]       Review of Systems   Unable to obtain any history from the patient due to altered mental status    Objective  Objective     Vitals:   Temp:  [97.9 °F (36.6 °C)] 97.9 °F (36.6 °C)  Heart Rate:  [61-83] 61  Resp:  [26] 26  BP: ()/(36-99) 91/60  Flow (L/min):  [15] 15    Physical Exam    Constitutional: Somnolent, disoriented   Eyes: Pupils equal, sclerae anicteric, no conjunctival injection   HENT: NCAT, mucous membranes dry   Respiratory: Bilateral air entry present, decreased breath sounds on the right lower lobe region, on nonrebreather   Cardiovascular: RRR, no murmurs   Gastrointestinal: Positive bowel sounds, soft, nontender, nondistended   Musculoskeletal: No bilateral ankle edema, no clubbing or cyanosis to extremities   Neurologic: Disoriented, incoherent speech, unable to follow commands for neurological examination    Result Review   Result Review:  I have personally reviewed the results from the time of this admission to 2/17/2024 01:06 EST and agree with these findings:  [x]  Laboratory  []  Microbiology  [x]  Radiology  [x]  EKG/Telemetry   []  Cardiology/Vascular   []  Pathology  []  Old records  []  Other:        Imaging Results (Last 24 Hours)       Procedure  Component Value Units Date/Time    CT Head Without Contrast [[id number]] Collected: 02/16/24 2114     Updated: 02/16/24 2221    Narrative:      PROCEDURE: CT HEAD WO CONTRAST     COMPARISON:  [facility], CT, HEAD W/O CONTRAST, 12/31/2019, 18:23.  INDICATIONS: headache.  Mental status change.     PROTOCOL:   Standard imaging protocol performed      RADIATION:   DLP: 1017.2 mGy*cm    MA and/or KV was adjusted to minimize radiation dose.          TECHNIQUE: After obtaining the patient's consent, CT images were obtained without non-ionic   intravenous contrast material.      FINDINGS:   Ventricular size and configuration are within normal limits.  There is age-appropriate generalized   atrophy.  No acute infarct or hemorrhage is identified.  There are no masses.  There is no skull   fracture.  There is opacification of the mastoid air cells and middle ear cavities on both sides   which may reflect otomastoiditis.  Bilateral hearing aids are present.       Impression:       1. No acute findings in the brain.  2. Opacification of the mastoid air cells and middle ear cavities may reflect otomastoiditis.              SIMON MCDONNELL MD         Electronically Signed and Approved By: SIMON MCDONNELL MD on 2/16/2024 at 22:17                     XR Chest 1 View [[id number]] Collected: 02/16/24 1824     Updated: 02/16/24 1827    Narrative:      PROCEDURE: XR CHEST 1 VW     COMPARISON: [facility], CR, XR CHEST 1 VW, 12/12/2023, 15:09.  [facility], CT, CT CHEST W CONTRAST DIAGNOSTIC, 12/12/2023, 16:52.     INDICATIONS: SOA     FINDINGS:      There is mild cardiomegaly.  There appears to be mild pulmonary edema.  Stable elevation of the   right hemidiaphragm.  Probable slight increase in the effusion and consolidation in the lung bases.     IMPRESSION:  Slight increase in the effusion and consolidation in the lung bases.       KOBY WOODARD MD         Electronically Signed and Approved By:  KOBY WOODARD MD on 2/16/2024 at 18:23                                    Assessment & Plan  Assessment / Plan     Assessment/Plan:   Acute metabolic encephalopathy DDX: UTI, worsening dementia, hypercapnia  Acute hypoxic and hypercapnic respiratory failure  UTI  Hyperkalemia  Elevated troponin likely NSTEMI type II from hypoxia, CHF exacerbation  Mild pulmonary edema  Likely CHF exacerbation  History of dementia  History of hypothyroidism  History of SLE  History of grade 1 diastolic dysfunction  Mild to moderate mitral valve regurgitation      Plan  Admit to inpatient, telemetry  Follow-up on blood cultures, urine cultures  Patient has history of ESBL in 2019, received ceftriaxone in the ED.  Will switch to meropenem.  De-escalate antibiotics based on the culture data  Repeat ABG in the a.m.  Continue nonrebreather, wean oxygen as tolerated  Pulmonology consult in a.m.  Received insulin, Lasix in the ED.  No hyperkalemic EKG changes.  Follow potassium levels with a.m. labs  Follow-up on TSH, T4 levels  Follow-up on repeat troponin levels in the a.m. if trending up, consult cardiology  Cardiac echo  Reassess and redose Lasix in the a.m.  Monitor urine output  N.p.o.  POCT glucose every 2 hours  Palliative care consult in the a.m.    DVT prophylaxis:       CODE STATUS:    Medical Intervention Limits: NO intubation (DNI)  Level Of Support Discussed With: Health Care Surrogate  Code Status (Patient has no pulse and is not breathing): No CPR (Do Not Attempt to Resuscitate)  Medical Interventions (Patient has pulse or is breathing): Limited Support      Admission Status:  I believe this patient meets inpatient status.    Part of this note may be an electronic transcription/translation of spoken language to printed text using the Dragon Dictation System    Delano Crystal MD               Electronically signed by Delano Crystal MD at 02/17/24 0106       Facility-Administered Medications as of 2/17/2024    Medication Dose Route Frequency Provider Last Rate Last Admin    arformoterol (BROVANA) nebulizer solution 15 mcg  15 mcg Nebulization BID - RT David Judge MD   15 mcg at 02/17/24 0926    budesonide (PULMICORT) nebulizer solution 0.5 mg  0.5 mg Nebulization BID - RT David Judge MD   0.5 mg at 02/17/24 0927    [COMPLETED] cefTRIAXone (ROCEPHIN) 1000 mg/100 mL 0.9% NS (MBP)  1,000 mg Intravenous Once Thuan Magana DO   Stopped at 02/16/24 2225    [COMPLETED] dextrose (D50W) (25 g/50 mL) IV injection 25 g  25 g Intravenous Once Thuan Magana DO   25 g at 02/16/24 2113    [COMPLETED] dextrose (D50W) (25 g/50 mL) IV injection 25 g  25 g Intravenous Once Delano Crystal MD   25 g at 02/17/24 0019    dextrose (D50W) (25 g/50 mL) IV injection 25 g  25 g Intravenous Q15 Min PRN Delano Crystal MD        [COMPLETED] dextrose (D50W) (25 g/50 mL) IV injection 25 g  25 g Intravenous Once Delano Crystal MD   25 g at 02/17/24 0640    dextrose (D50W) 50 % (25 g/50 mL) IV injection  - ADS Override Pull             dextrose (GLUTOSE) oral gel 15 g  15 g Oral Q15 Min PRN Delano Crystal MD        [COMPLETED] furosemide (LASIX) injection 40 mg  40 mg Intravenous Once Thuan Magana DO   40 mg at 02/16/24 1955    [COMPLETED] furosemide (LASIX) injection 40 mg  40 mg Intravenous Once Floyd Mendes MD   40 mg at 02/17/24 0840    glucagon (GLUCAGEN) injection 1 mg  1 mg Intramuscular Q15 Min PRN Delano Crystal MD        heparin (porcine) 5000 UNIT/ML injection 5,000 Units  5,000 Units Subcutaneous Q8H Delano Crystal MD   5,000 Units at 02/17/24 0602    [COMPLETED] insulin regular (humuLIN R,novoLIN R) injection 10 Units  10 Units Intravenous Once Thuan Magana DO   10 Units at 02/16/24 2114    [COMPLETED] meropenem (MERREM) 500 mg/100 mL 0.9% NS IVPB (mbp)  500 mg Intravenous Once Delano Crystal MD   500 mg at 02/17/24 0221    meropenem (MERREM) 500 mg/100 mL 0.9% NS IVPB (mbp)  500 mg  Intravenous Q8H Delano Crystal MD   500 mg at 02/17/24 0840    [COMPLETED] morphine injection 1 mg  1 mg Intravenous Once Delano Crystal MD   1 mg at 02/17/24 0446    nitroglycerin (NITROSTAT) SL tablet 0.4 mg  0.4 mg Sublingual Q5 Min PRN Delano Crystal MD        Pharmacy to Dose meropenem (MERREM)   Does not apply Continuous PRN Delano Crystal MD        sodium chloride 0.9 % flush 10 mL  10 mL Intravenous PRN Delano Crystal MD        sodium chloride 0.9 % flush 10 mL  10 mL Intravenous Q12H Delano Crystal MD   10 mL at 02/17/24 0841    sodium chloride 0.9 % flush 10 mL  10 mL Intravenous PRN Delano Crystal MD        sodium chloride 0.9 % infusion 40 mL  40 mL Intravenous GWENDOLYNN Delano Crystal MD         Lab Results (last 24 hours)       Procedure Component Value Units Date/Time    POC Glucose Finger Q2H [[id number]]  (Abnormal) Collected: 02/17/24 1038    Specimen: Blood from Finger Updated: 02/17/24 1042     Glucose 121 mg/dL      Comment: Serial Number: [id number]:  [id number]       Blood Gas, Venous -With Co-Ox Panel: Yes [[id number]]  (Abnormal) Collected: 02/17/24 0701    Specimen: Venous Blood Updated: 02/17/24 1014     pH, Venous 7.234 pH Units      pCO2, Venous 117.0 mm Hg      pO2, Venous 24.1 mm Hg      HCO3, Venous 48.3 mmol/L      Base Excess, Venous 15.9 mmol/L      O2 Saturation, Venous 44.6 %      Hemoglobin, Blood Gas 12.6 g/dL      Carboxyhemoglobin 0.1 %      Methemoglobin 0.30 %      Oxyhemoglobin 44.4 %      FHHB 55.2 %      Note 14/7 rate 20     Site Arterial: right radial     Modality BiPap     FIO2 100 %     ABG with Co-Ox and Electrolytes [[id number]]  (Abnormal) Collected: 02/17/24 0931    Specimen: Arterial Blood from Arm, Right Updated: 02/17/24 0936     pH, Arterial 7.334 pH units      pCO2, Arterial 83.5 mm Hg      pO2, Arterial 149.7 mm Hg      HCO3, Arterial 43.4 mmol/L      Base Excess, Arterial 13.6 mmol/L      O2 Saturation, Arterial 98.8 %       Hemoglobin, Blood Gas 13.7 g/dL      Carboxyhemoglobin 0.4 %      Methemoglobin 0.30 %      Oxyhemoglobin 98.1 %      FHHB 1.2 %      Fracisco's Test --     Note 20/8, rr16, 80%     Site Arterial: right radial     Modality BiPap     FIO2 80 %      Flow Rate --     Sodium, Arterial 134.5 mmol/L      Potassium, Arterial 4.25 mmol/L      Ionized Calcium, Arterial 1.20 mmol/L      Chloride, Arterial 91 mmol/L      Glucose, Arterial 144 mg/dL      Lactate, Arterial 1.46 mmol/L      PO2/FIO2 187    High Sensitivity Troponin T [[id number]]  (Abnormal) Collected: 02/17/24 0635    Specimen: Blood Updated: 02/17/24 0818     HS Troponin T 136 ng/L     Narrative:      High Sensitive Troponin T Reference Range:  <14.0 ng/L- Negative Female for AMI  <22.0 ng/L- Negative Male for AMI  >=14 - Abnormal Female indicating possible myocardial injury.  >=22 - Abnormal Male indicating possible myocardial injury.   Clinicians would have to utilize clinical acumen, EKG, Troponin, and serial changes to determine if it is an Acute Myocardial Infarction or myocardial injury due to an underlying chronic condition.         POC Glucose Once [[id number]]  (Abnormal) Collected: 02/17/24 0730    Specimen: Blood Updated: 02/17/24 0735     Glucose 156 mg/dL      Comment: Serial Number: [id number]:  [id number]       Phosphorus [[id number]]  (Normal) Collected: 02/17/24 0635    Specimen: Blood Updated: 02/17/24 0719     Phosphorus 2.9 mg/dL     Magnesium [[id number]]  (Normal) Collected: 02/17/24 0635    Specimen: Blood Updated: 02/17/24 0719     Magnesium 1.6 mg/dL     Comprehensive Metabolic Panel [[id number]]  (Abnormal) Collected: 02/17/24 0635    Specimen: Blood Updated: 02/17/24 0719     Glucose 93 mg/dL      BUN 28 mg/dL      Creatinine 0.85 mg/dL      Sodium 137 mmol/L      Potassium 4.9 mmol/L      Chloride 93 mmol/L      CO2 38.1 mmol/L      Calcium 9.4 mg/dL      Total Protein 5.4 g/dL      Albumin 2.8 g/dL      ALT (SGPT) 12 U/L      AST  (SGOT) 20 U/L      Alkaline Phosphatase 58 U/L      Total Bilirubin 0.4 mg/dL      Globulin 2.6 gm/dL      A/G Ratio 1.1 g/dL      BUN/Creatinine Ratio 32.9     Anion Gap 5.9 mmol/L      eGFR 67.2 mL/min/1.73     Narrative:      GFR Normal >60  Chronic Kidney Disease <60  Kidney Failure <15    The GFR formula is only valid for adults with stable renal function between ages 18 and 70.    CBC & Differential [[id number]]  (Abnormal) Collected: 02/17/24 0635    Specimen: Blood Updated: 02/17/24 0649    Narrative:      The following orders were created for panel order CBC & Differential.  Procedure                               Abnormality         Status                     ---------                               -----------         ------                     CBC Auto Differential[[id number]]        Abnormal            Final result                 Please view results for these tests on the individual orders.    CBC Auto Differential [[id number]]  (Abnormal) Collected: 02/17/24 0635    Specimen: Blood Updated: 02/17/24 0649     WBC 9.14 10*3/mm3      RBC 4.22 10*6/mm3      Hemoglobin 12.8 g/dL      Hematocrit 44.2 %      .7 fL      MCH 30.3 pg      MCHC 29.0 g/dL      RDW 14.0 %      RDW-SD 53.9 fl      MPV 10.9 fL      Platelets 164 10*3/mm3      Neutrophil % 67.7 %      Lymphocyte % 18.4 %      Monocyte % 12.3 %      Eosinophil % 0.8 %      Basophil % 0.3 %      Immature Grans % 0.5 %      Neutrophils, Absolute 6.19 10*3/mm3      Lymphocytes, Absolute 1.68 10*3/mm3      Monocytes, Absolute 1.12 10*3/mm3      Eosinophils, Absolute 0.07 10*3/mm3      Basophils, Absolute 0.03 10*3/mm3      Immature Grans, Absolute 0.05 10*3/mm3      nRBC 0.0 /100 WBC     POC Glucose Finger Q2H [[id number]]  (Normal) Collected: 02/17/24 0622    Specimen: Blood from Finger Updated: 02/17/24 0629     Glucose 84 mg/dL      Comment: Serial Number: [id number]:  [id number]       POC Glucose Finger Q2H [[id number]]  (Abnormal) Collected:  02/17/24 0259    Specimen: Blood from Finger Updated: 02/17/24 0300     Glucose 137 mg/dL      Comment: Serial Number: [id number]:  [id number]       TSH Rfx On Abnormal To Free T4 [[id number]]  (Normal) Collected: 02/16/24 1924    Specimen: Blood Updated: 02/17/24 0149     TSH 1.960 uIU/mL     ABG with Co-Ox and Electrolytes [[id number]]  (Abnormal) Collected: 02/17/24 0135    Specimen: Arterial Blood Updated: 02/17/24 0139     pH, Arterial 7.290 pH units      pCO2, Arterial 91.3 mm Hg      pO2, Arterial 92.4 mm Hg      HCO3, Arterial 42.9 mmol/L      Base Excess, Arterial 12.6 mmol/L      O2 Saturation, Arterial 96.5 %      Hemoglobin, Blood Gas 12.5 g/dL      Carboxyhemoglobin 0.3 %      Methemoglobin 0.20 %      Oxyhemoglobin 96.0 %      FHHB 3.5 %      Fracisco's Test Positive     Note --     Site Arterial: right radial     Modality Non Rebreather     FIO2 100 %      Flow Rate 15 lpm      Sodium, Arterial 133.6 mmol/L      Potassium, Arterial 4.38 mmol/L      Ionized Calcium, Arterial 1.19 mmol/L      Chloride, Arterial 91 mmol/L      Glucose, Arterial 150 mg/dL      Lactate, Arterial 1.71 mmol/L      PO2/FIO2 92    POC Glucose STAT [[id number]]  (Abnormal) Collected: 02/17/24 0122    Specimen: Blood Updated: 02/17/24 0125     Glucose 137 mg/dL      Comment: Serial Number: [id number]:  [id number]       POC Glucose Q1H [[id number]]  (Normal) Collected: 02/17/24 0007    Specimen: Blood Updated: 02/17/24 0009     Glucose 72 mg/dL      Comment: Serial Number: [id number]:  [id number]       POC Glucose Q1H [[id number]]  (Normal) Collected: 02/16/24 2311    Specimen: Blood Updated: 02/16/24 2312     Glucose 98 mg/dL      Comment: Serial Number: [id number]:  [id number]       POC Glucose Once [[id number]]  (Abnormal) Collected: 02/16/24 2200    Specimen: Blood Updated: 02/16/24 2214     Glucose 158 mg/dL      Comment: Serial Number: [id number]:  [id number]       POC Glucose Once [[id number]]   (Abnormal) Collected: 02/16/24 2111    Specimen: Blood Updated: 02/16/24 2214     Glucose 102 mg/dL      Comment: Serial Number: [id number]:  [id number]       Urinalysis, Microscopic Only - Urine, Clean Catch [[id number]]  (Abnormal) Collected: 02/1940    Specimen: Urine, Clean Catch Updated: 02/16/24 2135     RBC, UA 6-10 /HPF      WBC, UA 21-50 /HPF      Bacteria, UA 4+ /HPF      Squamous Epithelial Cells, UA 3-6 /HPF      Yeast, UA Moderate/2+ Yeast /HPF      Hyaline Casts, UA None Seen /LPF      Granular Casts, UA 7-12 /LPF      Calcium Oxalate Crystals, UA Moderate/2+ /HPF      Methodology Manual Light Microscopy    Urine Culture - Urine, Urine, Clean Catch [[id number]] Collected: 02/1940    Specimen: Urine, Clean Catch Updated: 02/16/24 2135    COVID-19, FLU A/B, RSV PCR 1 HR TAT - Swab, Nasopharynx [[id number]]  (Normal) Collected: 02/16/24 1950    Specimen: Swab from Nasopharynx Updated: 02/16/24 2040     COVID19 Not Detected     Influenza A PCR Not Detected     Influenza B PCR Not Detected     RSV, PCR Not Detected    Narrative:      Fact sheet for providers: https://www.fda.gov/media/150332/download    Fact sheet for patients: https://www.fda.gov/media/768755/download    Test performed by PCR.    Urinalysis With Culture If Indicated - Urine, Clean Catch [[id number]]  (Abnormal) Collected: 02/1940    Specimen: Urine, Clean Catch Updated: 02/16/24 2007     Color, UA Dark Yellow     Appearance, UA Turbid     pH, UA 5.5     Specific Gravity, UA 1.023     Glucose, UA Negative     Ketones, UA Trace     Bilirubin, UA Small (1+)     Blood, UA Trace     Protein, UA 30 mg/dL (1+)     Leuk Esterase, UA Large (3+)     Nitrite, UA Negative     Urobilinogen, UA 1.0 E.U./dL    Narrative:      In absence of clinical symptoms, the presence of pyuria, bacteria, and/or nitrites on the urinalysis result does not correlate with infection.    High Sensitivity Troponin T 2Hr [[id number]]  (Abnormal) Collected:  "02/16/24 1924    Specimen: Blood Updated: 02/16/24 2005     HS Troponin T 146 ng/L      Troponin T Delta -1 ng/L     Narrative:      High Sensitive Troponin T Reference Range:  <14.0 ng/L- Negative Female for AMI  <22.0 ng/L- Negative Male for AMI  >=14 - Abnormal Female indicating possible myocardial injury.  >=22 - Abnormal Male indicating possible myocardial injury.   Clinicians would have to utilize clinical acumen, EKG, Troponin, and serial changes to determine if it is an Acute Myocardial Infarction or myocardial injury due to an underlying chronic condition.         Ammonia [[id number]]  (Normal) Collected: 02/16/24 1924    Specimen: Blood Updated: 02/16/24 2002     Ammonia 13 umol/L      Comment: Specimen hemolyzed.  Results may be affected.       Blood Culture - Blood, Arm, Right [[id number]] Collected: 02/16/24 1946    Specimen: Blood from Arm, Right Updated: 02/16/24 1959    Blood Culture - Blood, Arm, Right [[id number]] Collected: 02/16/24 1946    Specimen: Blood from Arm, Right Updated: 02/16/24 1959    Procalcitonin [[id number]]  (Normal) Collected: 02/16/24 1812    Specimen: Blood Updated: 02/16/24 1949     Procalcitonin 0.04 ng/mL     Narrative:      As a Marker for Sepsis (Non-Neonates):    1. <0.5 ng/mL represents a low risk of severe sepsis and/or septic shock.  2. >2 ng/mL represents a high risk of severe sepsis and/or septic shock.    As a Marker for Lower Respiratory Tract Infections that require antibiotic therapy:    PCT on Admission    Antibiotic Therapy       6-12 Hrs later    >0.5                Strongly Recommended  >0.25 - <0.5        Recommended  0.1 - 0.25          Discouraged              Remeasure/reassess PCT  <0.1                Strongly Discouraged     Remeasure/reassess PCT    As 28 day mortality risk marker: \"Change in Procalcitonin Result\" (>80% or <=80%) if Day 0 (or Day 1) and Day 4 values are available. Refer to http://www.[employer]-pct-calculator.com    Change in PCT <=80%  A " decrease of PCT levels below or equal to 80% defines a positive change in PCT test result representing a higher risk for 28-day all-cause mortality of patients diagnosed with severe sepsis for septic shock.    Change in PCT >80%  A decrease of PCT levels of more than 80% defines a negative change in PCT result representing a lower risk for 28-day all-cause mortality of patients diagnosed with severe sepsis or septic shock.    This test is Prognostic not Diagnostic, if elevated correlate with clinical findings before administering antibiotic treatment.        VBG with Co-Ox and Electrolytes [[id number]]  (Abnormal) Collected: 02/16/24 1923    Specimen: Venous Blood Updated: 02/1940     pH, Venous 7.259 pH Units      pCO2, Venous 88.8 mm Hg      pO2, Venous <40.5 mm Hg      HCO3, Venous 38.9 mmol/L      Base Excess, Venous 8.2 mmol/L      O2 Saturation, Venous 62.5 %      Hemoglobin, Blood Gas 13.9 g/dL      Carboxyhemoglobin 1.5 %      Methemoglobin 0.30 %      Oxyhemoglobin 61.4 %      FHHB 36.8 %      Site Drawn by RN     Modality Mask - Nonbreather     FIO2 100 %      Flow Rate 15 lpm      Sodium, Venous 133.1 mmol/L      Potassium, Venous 5.5 mmol/L      Ionized Calcium, Arterial 1.14 mmol/L      Chloride, Venous  90 mmol/L      Glucose, Arterial 113 mg/dL      Lactate, Arterial 1.76 mmol/L     Protime-INR [[id number]]  (Abnormal) Collected: 02/16/24 1812    Specimen: Blood Updated: 02/16/24 1936     Protime 17.9 Seconds      INR 1.45    Narrative:      Suggested Therapeutic Ranges For Oral Anticoagulant Therapy:  Level of Therapy                      INR Target Range  Standard Dose                            2.0-3.0  High Dose                                2.5-3.5  Patients not receiving anticoagulant  Therapy Normal Range                     0.86-1.15    Single High Sensitivity Troponin T [[id number]]  (Abnormal) Collected: 02/16/24 1812    Specimen: Blood Updated: 02/16/24 1859     HS Troponin T 147 ng/L      Narrative:      High Sensitive Troponin T Reference Range:  <14.0 ng/L- Negative Female for AMI  <22.0 ng/L- Negative Male for AMI  >=14 - Abnormal Female indicating possible myocardial injury.  >=22 - Abnormal Male indicating possible myocardial injury.   Clinicians would have to utilize clinical acumen, EKG, Troponin, and serial changes to determine if it is an Acute Myocardial Infarction or myocardial injury due to an underlying chronic condition.         [facility] Draw [[id number]] Collected: 02/16/24 1812    Specimen: Blood Updated: 02/16/24 1850    Narrative:      The following orders were created for panel order [facility] Draw.  Procedure                               Abnormality         Status                     ---------                               -----------         ------                     Green Top (Gel)[[id number]]                                  Final result               Lavender Top[[id number]]                                     Final result               Gold Top - SST[[id number]]                                   Final result               Light Blue Top[[id number]]                                   Final result                 Please view results for these tests on the individual orders.    Gold Top - SST [[id number]] Collected: 02/16/24 1812    Specimen: Blood Updated: 02/16/24 1850     Extra Tube Hold Specimen for Add Ons    Green Top (Gel) [[id number]] Collected: 02/16/24 1812    Specimen: Blood Updated: 02/16/24 1850     Extra Tube Hold Specimen for Add Ons    Light Blue Top [[id number]] Collected: 02/16/24 1812    Specimen: Blood Updated: 02/16/24 1850     Extra Tube Hold Specimen for Add Ons    Lavender Top [[id number]] Collected: 02/16/24 1812    Specimen: Blood Updated: 02/16/24 1850     Extra Tube Hold Specimen for Add Ons    Comprehensive Metabolic Panel [[id number]]  (Abnormal) Collected: 02/16/24 1812    Specimen: Blood Updated: 02/16/24 1848     Glucose 129 mg/dL      BUN 27 mg/dL       Creatinine 0.83 mg/dL      Sodium 136 mmol/L      Potassium 5.8 mmol/L      Chloride 92 mmol/L      CO2 41.7 mmol/L      Calcium 9.1 mg/dL      Total Protein 5.7 g/dL      Albumin 2.9 g/dL      ALT (SGPT) 13 U/L      AST (SGOT) 21 U/L      Alkaline Phosphatase 69 U/L      Total Bilirubin 0.3 mg/dL      Globulin 2.8 gm/dL      A/G Ratio 1.0 g/dL      BUN/Creatinine Ratio 32.5     Anion Gap 2.3 mmol/L      eGFR 69.2 mL/min/1.73     Narrative:      GFR Normal >60  Chronic Kidney Disease <60  Kidney Failure <15    The GFR formula is only valid for adults with stable renal function between ages 18 and 70.    BNP [[id number]]  (Abnormal) Collected: 02/16/24 1812    Specimen: Blood Updated: 02/16/24 1843     proBNP 19,598.0 pg/mL     Narrative:      This assay is used as an aid in the diagnosis of individuals suspected of having heart failure. It can be used as an aid in the diagnosis of acute decompensated heart failure (ADHF) in patients presenting with signs and symptoms of ADHF to the emergency department (ED). In addition, NT-proBNP of <300 pg/mL indicates ADHF is not likely.    Age Range Result Interpretation  NT-proBNP Concentration (pg/mL:      <50             Positive            >450                   Gray                 300-450                    Negative             <300    50-75           Positive            >900                  Gray                300-900                  Negative            <300      >75             Positive            >1800                  Gray                300-1800                  Negative            <300    Lactic Acid, Plasma [[id number]]  (Normal) Collected: 02/16/24 1812    Specimen: Blood Updated: 02/16/24 1841     Lactate 1.5 mmol/L     CBC & Differential [[id number]]  (Abnormal) Collected: 02/16/24 1812    Specimen: Blood Updated: 02/16/24 1823    Narrative:      The following orders were created for panel order CBC & Differential.  Procedure                                Abnormality         Status                     ---------                               -----------         ------                     CBC Auto Differential[[id number]]        Abnormal            Final result                 Please view results for these tests on the individual orders.    CBC Auto Differential [[id number]]  (Abnormal) Collected: 02/16/24 1812    Specimen: Blood Updated: 02/16/24 1823     WBC 7.41 10*3/mm3      RBC 4.35 10*6/mm3      Hemoglobin 12.9 g/dL      Hematocrit 45.6 %      .8 fL      MCH 29.7 pg      MCHC 28.3 g/dL      RDW 13.5 %      RDW-SD 53.3 fl      MPV 11.0 fL      Platelets 187 10*3/mm3      Neutrophil % 69.5 %      Lymphocyte % 17.4 %      Monocyte % 12.0 %      Eosinophil % 0.4 %      Basophil % 0.3 %      Immature Grans % 0.4 %      Neutrophils, Absolute 5.15 10*3/mm3      Lymphocytes, Absolute 1.29 10*3/mm3      Monocytes, Absolute 0.89 10*3/mm3      Eosinophils, Absolute 0.03 10*3/mm3      Basophils, Absolute 0.02 10*3/mm3      Immature Grans, Absolute 0.03 10*3/mm3      nRBC 0.0 /100 WBC           Imaging Results (Last 24 Hours)       Procedure Component Value Units Date/Time    XR Chest 1 View [[id number]] Collected: 02/17/24 0814     Updated: 02/17/24 0817    Narrative:      PROCEDURE: XR CHEST 1 VW     COMPARISON: [facility], CT, CT CHEST W CONTRAST DIAGNOSTIC, 12/12/2023, 16:52.    [facility], CR, XR CHEST 1 VW, 2/16/2024, 18:25.     INDICATIONS: hypoxia     FINDINGS:   There is mild-to-moderate bilateral perihilar and basilar airspace opacity.  Finding appears   similar to the previous exam.  Small bilateral pleural effusions are not excluded.  The cardiac and   mediastinal silhouettes appear normal.       Impression:         1. Mild-to-moderate bilateral perihilar and basilar airspace opacity suspected to represent   pulmonary edema.  Superimposed pneumonia is not excluded  2. Suspected small bilateral pleural effusions                   Tavo Law M.D.         Electronically Signed and Approved By: Tavo Law M.D. on 2/17/2024 at 8:14                     CT Head Without Contrast [[id number]] Collected: 02/16/24 2114     Updated: 02/16/24 2221    Narrative:      PROCEDURE: CT HEAD WO CONTRAST     COMPARISON:  [facility], CT, HEAD W/O CONTRAST, 12/31/2019, 18:23.  INDICATIONS: headache.  Mental status change.     PROTOCOL:   Standard imaging protocol performed      RADIATION:   DLP: 1017.2 mGy*cm    MA and/or KV was adjusted to minimize radiation dose.          TECHNIQUE: After obtaining the patient's consent, CT images were obtained without non-ionic   intravenous contrast material.      FINDINGS:   Ventricular size and configuration are within normal limits.  There is age-appropriate generalized   atrophy.  No acute infarct or hemorrhage is identified.  There are no masses.  There is no skull   fracture.  There is opacification of the mastoid air cells and middle ear cavities on both sides   which may reflect otomastoiditis.  Bilateral hearing aids are present.       Impression:       1. No acute findings in the brain.  2. Opacification of the mastoid air cells and middle ear cavities may reflect otomastoiditis.              SIMON MCDONNELL MD         Electronically Signed and Approved By: SIMON MCDONNELL MD on 2/16/2024 at 22:17                     XR Chest 1 View [[id number]] Collected: 02/16/24 1824     Updated: 02/16/24 1827    Narrative:      PROCEDURE: XR CHEST 1 VW     COMPARISON: [facility], CR, XR CHEST 1 VW, 12/12/2023, 15:09.  [facility], CT, CT CHEST W CONTRAST DIAGNOSTIC, 12/12/2023, 16:52.     INDICATIONS: SOA     FINDINGS:      There is mild cardiomegaly.  There appears to be mild pulmonary edema.  Stable elevation of the   right hemidiaphragm.  Probable slight increase in the effusion and consolidation in the lung bases.     IMPRESSION:  Slight increase in the effusion and consolidation  in the lung bases.       KOBY WOODARD MD         Electronically Signed and Approved By: KOBY WOODARD MD on 2/16/2024 at 18:23                           ECG/EMG Results (last 24 hours)       Procedure Component Value Units Date/Time    ECG 12 Lead ED Triage Standing Order; SOA [[id number]] Collected: 02/16/24 1813     Updated: 02/16/24 1814     QT Interval 374 ms      QTC Interval 393 ms     Narrative:      HEART RATE= 68  bpm  RR Interval= 904  ms  NC Interval= 143  ms  P Horizontal Axis= 72  deg  P Front Axis= 1  deg  QRSD Interval= 106  ms  QT Interval= 374  ms  QTcB= 393  ms  QRS Axis= -31  deg  T Wave Axis= 128  deg  - ABNORMAL ECG -  Sinus rhythm  Atrial premature complex  Incomplete left bundle branch block  Abnormal R-wave progression, late transition  LVH with secondary repolarization abnormality  Anterior ST elevation, probably due to LVH  The disappearance of BBB may be rate related  When compared with ECG of 13-Dec-2023 6:58:19,  Significant rate decrease  Electronically Signed By:   Date and Time of Study: 2024-02-16 18:13:29    Adult Transthoracic Echo Complete W/ Cont if Necessary Per Protocol [[id number]] Resulted: 02/17/24 1058     Updated: 02/17/24 1059     EF(MOD-bp) 64.2 %      LVIDd 3.6 cm      LVIDs 2.47 cm      IVSd 1.05 cm      LVPWd 1.05 cm      FS 31.2 %      IVS/LVPW 1.00 cm      ESV(cubed) 15.0 ml      LV Sys Vol (BSA corrected) 7.9 cm2      EDV(cubed) 46.0 ml      LV Trejo Vol (BSA corrected) 25.3 cm2      LV mass(C)d 115.2 grams      LVOT area 2.9 cm2      LVOT diam 1.91 cm      EDV(MOD-sp2) 37.0 ml      EDV(MOD-sp4) 45.0 ml      ESV(MOD-sp2) 14.8 ml      ESV(MOD-sp4) 14.0 ml      SV(MOD-sp2) 22.2 ml      SV(MOD-sp4) 31.0 ml      SI(MOD-sp2) 12.5 ml/m2      SI(MOD-sp4) 17.4 ml/m2      EF(MOD-sp2) 60.0 %      EF(MOD-sp4) 68.9 %      MV E max laurent 69.0 cm/sec      MV A max laurent 78.4 cm/sec      MV dec time 0.17 sec      MV E/A 0.88     Med Peak E' Laurent 3.9 cm/sec      Lat Peak E' Laurent 6.7  cm/sec      TR max kiah 290.5 cm/sec      Avg E/e' ratio 13.02     RVIDd 2.7 cm      TAPSE (>1.6) 1.42 cm      LA dimension (2D)  2.9 cm      MV dec slope 408.2 cm/sec2      TR max PG 33.8 mmHg      Ao root diam 2.7 cm           Orders (active)        Start     Ordered    02/17/24 1024  S. Pneumo Ag Urine or CSF - Urine, Urine, Clean Catch  Once         02/17/24 1024    02/17/24 1024  Legionella Antigen, Urine - Urine, Urine, Clean Catch  Once         02/17/24 1024    02/17/24 1024  Respiratory Panel PCR w/COVID-19(SARS-CoV-2) ANG[facility]ICA/PHI/PATTI/PAD/COR/TK In-House, NP Swab in UTM/VTM, 2 HR TAT - Swab, Nasopharynx  Once         02/17/24 1024    02/17/24 1020  RT to Initiate Bronchopulmonary Hygiene Protocol  Once         02/17/24 1019    02/17/24 0930  budesonide (PULMICORT) nebulizer solution 0.5 mg  2 Times Daily - RT         02/17/24 0819    02/17/24 0930  arformoterol (BROVANA) nebulizer solution 15 mcg  2 Times Daily - RT         02/17/24 0819    02/17/24 0822  NIPPV (CPAP or BIPAP)  Until Discontinued        Comments: Provider advised it is OK to adjust low tidal volume to 150    02/17/24 0822    02/17/24 0815  meropenem (MERREM) 500 mg/100 mL 0.9% NS IVPB (mbp)  Every 8 Hours         02/17/24 0120    02/17/24 0800  Oral Care  2 Times Daily       02/17/24 0107    02/17/24 0636  dextrose (GLUTOSE) oral gel 15 g  Every 15 Minutes PRN         02/17/24 0636    02/17/24 0636  dextrose (D50W) (25 g/50 mL) IV injection 25 g  Every 15 Minutes PRN         02/17/24 0636    02/17/24 0636  glucagon (GLUCAGEN) injection 1 mg  Every 15 Minutes PRN         02/17/24 0636    02/17/24 0636  dextrose (D50W) 50 % (25 g/50 mL) IV injection  - ADS Override Pull        Note to Pharmacy: Created by cabinet override    02/17/24 0636    02/17/24 0600  heparin (porcine) 5000 UNIT/ML injection 5,000 Units  Every 8 Hours Scheduled         02/17/24 0107    02/17/24 0400  Vital Signs  Every 4 Hours       02/17/24 0107    02/17/24 0308  Inpatient  Nutrition Consult  Once        Provider:  (Not yet assigned)    02/17/24 0307    02/17/24 0308  Wound Ostomy Eval & Treat  Once         02/17/24 0307    02/17/24 0307  Inpatient Case Management  Consult  Once        Provider:  (Not yet assigned)    02/17/24 0307    02/17/24 0200  sodium chloride 0.9 % flush 10 mL  Every 12 Hours Scheduled         02/17/24 0107    02/17/24 0200  POC Glucose Finger Q2H  Every 2 Hours      Comments: Complete no more than 45 minutes prior to patient eating      02/17/24 0107    02/17/24 0107  Intake & Output  Every Shift       02/17/24 0107    02/17/24 0107  Weigh Patient  Once         02/17/24 0107    02/17/24 0107  Insert Peripheral IV  Once         02/17/24 0107    02/17/24 0107  Continuous Cardiac Monitoring  Continuous        Comments: Follow Standing Orders As Outlined in Process Instructions (Open Order Report to View Full Instructions)    02/17/24 0107    02/17/24 0107  Telemetry - Maintain IV Access  Continuous         02/17/24 0107    02/17/24 0107  Telemetry - Place Orders & Notify Provider of Results When Patient Experiences Acute Chest Pain, Dysrhythmia or Respiratory Distress  Until Discontinued         02/17/24 0107    02/17/24 0107  Pulse Oximetry, Continuous  Continuous         02/17/24 0107    02/17/24 0107  Adult Transthoracic Echo Complete W/ Cont if Necessary Per Protocol  Once         02/17/24 0107    02/17/24 0107  Inpatient Palliative Care Team Consult  Once        Provider:  (Not yet assigned)    02/17/24 0107    02/17/24 0107  Inpatient Pulmonology Consult  Once        Specialty:  Pulmonary Disease  Provider:  David Judge MD    02/17/24 0107    02/17/24 0107  Fall Precautions  Continuous         02/17/24 0107    02/17/24 0107  NPO Diet NPO Type: Strict NPO  Diet Effective Now         02/17/24 0107    02/17/24 0107  SLP Consult: Eval & Treat Swallow Disorder  Once         02/17/24 0107    02/17/24 0106  Pharmacy to Dose meropenem (MERREM)   Continuous PRN         02/17/24 0107    02/17/24 0106  sodium chloride 0.9 % flush 10 mL  As Needed         02/17/24 0107    02/17/24 0106  sodium chloride 0.9 % infusion 40 mL  As Needed         02/17/24 0107    02/17/24 0106  nitroglycerin (NITROSTAT) SL tablet 0.4 mg  Every 5 Minutes PRN         02/17/24 0107    02/17/24 0055  Code Status and Medical Interventions:  Continuous         02/17/24 0105    02/17/24 0005  SLP Consult: Eval & Treat RN Dysphagia Screen Failed  Once         02/17/24 0004    02/16/24 2304  Inpatient Hospitalist Consult  Once        Specialty:  Hospitalist  Provider:  Delano Crystal MD    02/16/24 2303    02/16/24 2136  Urine Culture - Urine, Urine, Clean Catch  Once         02/16/24 2135 02/16/24 1922  Blood Culture - Blood, Arm, Right  Once        See Hyperspace for full Linked Orders Report.    02/16/24 1921    02/16/24 1922  Blood Culture - Blood, Arm, Right  Once        Comments: From a different site than #1.     See Hyperspace for full Linked Orders Report.    02/16/24 1921    02/16/24 1811  Insert Peripheral IV  Once         02/16/24 1810    02/16/24 1810  sodium chloride 0.9 % flush 10 mL  As Needed         02/16/24 1810    Unscheduled  Oxygen Therapy- Nasal Cannula; Titrate 1-6 LPM Per SpO2; 90 - 95%  Continuous PRN       02/16/24 1810    Unscheduled  Oxygen Therapy- Simple Face Mask; Titrate 5-12 LPM Per SpO2; 90 - 95%  Continuous PRN       02/17/24 0107    Unscheduled  Follow Hypoglycemia Standing Orders For Blood Glucose <70 & Notify Provider of Treatment  As Needed      Comments: Follow Hypoglycemia Orders As Outlined in Process Instructions (Open Order Report to View Full Instructions)  Notify Provider Any Time Hypoglycemia Treatment is Administered    02/17/24 0636                  Ventilator/Non-Invasive Ventilation Settings (From admission, onward)       Start     Ordered    02/17/24 0822  NIPPV (CPAP or BIPAP)  Until Discontinued        Comments: Provider advised  it is OK to adjust low tidal volume to 150   Question Answer Comment   Indication Acute Respiratory Failure    Type BIPAP    IPAP 20    EPAP 8    Breath Rate 16    Titrate Oxygen for SpO2 90 - 95%        02/17/24 0822    02/17/24 0235  NIPPV (CPAP or BIPAP)  Until Discontinued,   Status:  Canceled        Comments: Provider advised it is OK to adjust low tidal volume to 150   Question Answer Comment   Indication Acute Respiratory Failure    Type BIPAP    IPAP 14    EPAP 7    Breath Rate 20    Titrate Oxygen for SpO2 90 - 95%        02/17/24 0237    02/17/24 0139  NIPPV (CPAP or BIPAP)  Until Discontinued,   Status:  Canceled        Question Answer Comment   Indication Acute Respiratory Failure    Type BIPAP    IPAP 14    EPAP 7    Breath Rate 20    Titrate Oxygen for SpO2 90 - 95%        02/17/24 0139

## 2024-02-17 NOTE — PLAN OF CARE
Goal Outcome Evaluation:  Plan of Care Reviewed With: patient        Progress: improving  Outcome Evaluation: Patient's bipap settings changed to 20/8, 50%, abg shows some improvement: co2 came down to 83, pH is 7.33. Patient is staying on these settings for now and will continue to monitor.

## 2024-02-17 NOTE — PROGRESS NOTES
RT EQUIPMENT DEVICE RELATED - SKIN ASSESSMENT    RT Medical Equipment/Device:     NIV Mask:  Under-the-nose   size:       Skin Assessment:      Cheek:  Redness  Chin:  Redness  Nose:  Redness  Mouth:  Redness    Device Skin Pressure Protection:  Pressure points protected    Nurse Notification:  Yes, notified Alicia Estevez, RRT

## 2024-02-17 NOTE — PROGRESS NOTES
[facility]   Hospitalist Progress Note  Date: 2024  Patient Name: Marylou Levin  : 1940  MRN: [id number]  Date of admission: 2024  Room/Bed: [facility]      Subjective   Subjective     Chief Complaint: Decreased responsiveness    Summary:Marylou Levin is a 83 y.o. female  with a past medical history of dementia, hypothyroidism, SLE, dementia, presented to the ED for evaluation of decreased responsiveness.  As per the reports present to the ED physician, patient has a steady decline over the last several weeks.  Currently staying at a nursing facility.  Patient has been nonambulatory in the past 6 months.  Usually patient noted to have some intermittent confusion but able to have conversation.  For the last 2 days patient has significant decreased in response.  Only responding to the physical symptoms.  EMS was called.  Patient noted to be hypotensive and hypoxic, started on supplemental oxygen.  Patient is unable to provide any history at this time.  Intermittently responsive.  Incoherent speech.     In the ED, vital signs temperature 91.9, pulse 79, respiratory 26, blood pressure 90s over 60s, on nonrebreather 15 L saturating around 90%.  Labs initial troponin 141, repeat troponin 146, proBNP 1 1998, VBG showed 7.25/88, sodium 136, potassium 5.8, chloride 92, bicarb 41.7, BUN 27, creatinine 0.83, rest of the CMP with no significant findings, normal ammonia, normal lactic acid, procalcitonin 0.04, urinalysis showed 21-50 WBC, 4+ bacteria, blood cultures and urine cultures in process.  COVID flu RSV negative.  Chest x-ray showed slight increase in the effusion and consolidation in the right lung bases compared to the chest x-ray on 2023.  CT head without contrast showed no acute findings.  Opacification of the mastoid air cells and middle ear cavities may reflect otomastoiditis.  Received ceftriaxone in the ED.  Patient has been admitted for further evaluation and management of acute  metabolic encephalopathy, acute hypoxic and hypercapnic respiratory failure, UTI, hyperkalemia.    Interval Followup: Patient seen evaluated on this morning.  Patient with worsening pCO2 this morning and subsequently has been placed back on BiPAP with adjustments made in settings per pulmonology.  At the time of my evaluation the patient is awake alert trying to remove her mask.    Review of Systems    All systems reviewed and negative except for what is outlined above.      Objective   Objective     Vitals:   Temp:  [97.4 °F (36.3 °C)-97.9 °F (36.6 °C)] 97.4 °F (36.3 °C)  Heart Rate:  [61-83] 70  Resp:  [24-26] 24  BP: ()/(36-99) 110/57  Flow (L/min):  [15] 15    Physical Exam   General: Awake, alert, pulling at mask  HENT: NCAT, mucosas tacky  Eyes: pupils equal, no scleral icterus  Cardiovascular: RRR, no patient will murmurs   Pulmonary: Diminished breath sounds throughout all lung fields with occasional cough noted; no wheezes  Gastrointestinal: S/ND/NT, +BS  Musculoskeletal: No gross deformities  Skin: No jaundice, no rash on exposed skin appreciated  Neuro: CN II through XII grossly intact;  no tremor  Psych: Mood and affect appropriate  : No Ontiveros catheter; no suprapubic tenderness    Result Review    Result Review:  I have personally reviewed these results:  [x]  Laboratory      Lab 02/17/24  0635 02/17/24  0135 02/16/24  1923 02/16/24  1812   WBC 9.14  --   --  7.41   HEMOGLOBIN 12.8  --   --  12.9   HEMATOCRIT 44.2  --   --  45.6   PLATELETS 164  --   --  187   NEUTROS ABS 6.19  --   --  5.15   IMMATURE GRANS (ABS) 0.05  --   --  0.03   LYMPHS ABS 1.68  --   --  1.29   MONOS ABS 1.12*  --   --  0.89   EOS ABS 0.07  --   --  0.03   .7*  --   --  104.8*   PROCALCITONIN  --   --   --  0.04   LACTATE  --   --   --  1.5   LACTATE, ARTERIAL  --  1.71 1.76  --    PROTIME  --   --   --  17.9*         Lab 02/17/24  0635 02/17/24  0135 02/16/24  1924 02/16/24  1923 02/16/24  1812   SODIUM 137  --    --   --  136   SODIUM, ARTERIAL  --  133.6*  --   --   --    SODIUM, VENOUS  --   --   --  133.1*  --    POTASSIUM 4.9  --   --   --  5.8*   POTASSIUM, VENOUS  --   --   --  5.5*  --    CHLORIDE 93*  --   --   --  92*   CHLORIDE, VENOUS  --   --   --  90*  --    CO2 38.1*  --   --   --  41.7*   ANION GAP 5.9  --   --   --  2.3*   BUN 28*  --   --   --  27*   CREATININE 0.85  --   --   --  0.83   EGFR 67.2  --   --   --  69.2   GLUCOSE 93  --   --   --  129*   GLUCOSE, ARTERIAL  --  150*  --  113*  --    CALCIUM 9.4  --   --   --  9.1   IONIZED CALCIUM  --  1.19  --  1.14  --    MAGNESIUM 1.6  --   --   --   --    PHOSPHORUS 2.9  --   --   --   --    TSH  --   --  1.960  --   --          Lab 02/17/24  0635 02/16/24  1812   TOTAL PROTEIN 5.4* 5.7*   ALBUMIN 2.8* 2.9*   GLOBULIN 2.6 2.8   ALT (SGPT) 12 13   AST (SGOT) 20 21   BILIRUBIN 0.4 0.3   ALK PHOS 58 69         Lab 02/17/24  0635 02/16/24  1924 02/16/24  1812   PROBNP  --   --  19,598.0*   HSTROP T 136* 146* 147*   PROTIME  --   --  17.9*   INR  --   --  1.45*                 Lab 02/17/24  0135 02/16/24  1923   PH, ARTERIAL 7.290*  --    PCO2, ARTERIAL 91.3*  --    PO2 ART 92.4  --    O2 SATURATION ART 96.5  --    FIO2 100 100   HCO3 ART 42.9*  --    BASE EXCESS ART 12.6*  --    CARBOXYHEMOGLOBIN 0.3 1.5     Brief Urine Lab Results  (Last result in the past 365 days)        Color   Clarity   Blood   Leuk Est   Nitrite   Protein   CREAT   Urine HCG        02/1940 Dark Yellow   Turbid   Trace   Large (3+)   Negative   30 mg/dL (1+)                 [x]  Microbiology   Microbiology Results (last 10 days)       Procedure Component Value - Date/Time    COVID-19, FLU A/B, RSV PCR 1 HR TAT - Swab, Nasopharynx [[id number]]  (Normal) Collected: 02/16/24 1950    Lab Status: Final result Specimen: Swab from Nasopharynx Updated: 02/16/24 2040     COVID19 Not Detected     Influenza A PCR Not Detected     Influenza B PCR Not Detected     RSV, PCR Not Detected     Narrative:      Fact sheet for providers: https://www.fda.gov/media/933196/download    Fact sheet for patients: https://www.fda.gov/media/994232/download    Test performed by PCR.          [x]  Radiology  XR Chest 1 View    Result Date: 2/17/2024    1. Mild-to-moderate bilateral perihilar and basilar airspace opacity suspected to represent pulmonary edema.  Superimposed pneumonia is not excluded 2. Suspected small bilateral pleural effusions       Tavo Law M.D.       Electronically Signed and Approved By: Tavo Law M.D. on 2/17/2024 at 8:14             CT Head Without Contrast    Result Date: 2/16/2024   1. No acute findings in the brain. 2. Opacification of the mastoid air cells and middle ear cavities may reflect otomastoiditis.      SIMON MCDONNELL MD       Electronically Signed and Approved By: SIMON MCDONNELL MD on 2/16/2024 at 22:17            []  EKG/Telemetry   []  Cardiology/Vascular   []  Pathology  []  Old records  []  Other:    Assessment & Plan   Assessment / Plan     Assessment:  Acute metabolic encephalopathy DDX: UTI, worsening dementia, hypercapnia  Acute hypoxic and hypercapnic respiratory failure  GNB UTI  Hyperkalemia.  Resolved  Elevated troponin likely NSTEMI type II from hypoxia, CHF exacerbation  Acute on chronic diastolic congestive heart failure  Dimension  Hypothyroidism  History of SLE  Mild to moderate mitral valve regurgitation    Plan:  Continue to monitor on telemetry  Follow-up on blood cultures, urine cultures  Patient has history of ESBL in 2019, received ceftriaxone in the ED. subsequently switched to meropenem.  De-escalate antibiotics based on the culture data  Continue BiPAP  Pulmonology consulted for further assistance in management.  Consult is greatly appreciated.    TSH within normal limits  Follow-up on repeat troponin levels in the a.m. if trending up, consult cardiology  Cardiac echo  Reassess and redose Lasix in the a.m.  Monitor strict I's and O's and daily  weights  N.p.o. until able to safely take orals.   POCT glucose every 2 hours  Palliative care consult when services available     Discussed with RN.    DVT prophylaxis:  Medical DVT prophylaxis orders are present.        CODE STATUS:   Medical Intervention Limits: NO intubation (DNI)  Level Of Support Discussed With: Health Care Surrogate  Code Status (Patient has no pulse and is not breathing): No CPR (Do Not Attempt to Resuscitate)  Medical Interventions (Patient has pulse or is breathing): Limited Support      Electronically signed by Jimi Lerma MD, 2/17/2024, 09:27 EST.

## 2024-02-17 NOTE — PLAN OF CARE
Goal Outcome Evaluation:   Patient placed on BIPAP 14/7 Rate:20. Patient tolerated well for a few hours and then removed mask. Patient kept fighting mask. RN called MD and patient was placed back on non-rebreather.

## 2024-02-17 NOTE — PLAN OF CARE
Goal Outcome Evaluation:  Plan of Care Reviewed With: patient        Progress: no change  Outcome Evaluation: Patient placed on BiPAP 14/7 rate 20 for high CO2. Will continue to monitor.

## 2024-02-17 NOTE — CONSULTS
Pulmonary / Critical Care Consult Note      Patient Name: Marylou Levin  : 1940  MRN: [id number]  Primary Care Physician:  Brady Rae MD  Referring Physician: Jimi Lerma, *  Date of admission: 2024    Subjective   Subjective     Reason for Consult/ Chief Complaint:   Hypoxic and hypercapnic respiratory failure requiring NIPPV    HPI:  Marylou Levin is a 83 y.o. female with past medical history of SLE, atrial fibrillation, debility, dementia, anger who presented to the emergency department for evaluation of altered mental status requiring 15 L nonrebreather mask.  The ED, ABG was 7.2 , troponins were 136, and cultures were obtained.  CXR demonstrated mild to moderate airspace opacities likely to represent pulmonary edema and suspected small bilateral pleural effusions.  For the above reasons, the service was consulted to assist in the management and treatment of the patient's acute issues.  Upon assessment, patient lying in bed wearing NIPPV in moderate distress.  She was unable to participate and history or examination.    Review of Systems  Unable to obtain    Personal History     Past Medical History:   Diagnosis Date    Atrial fibrillation     Dementia     pt son    Difficulty walking     Hypokalemia     Hypothyroidism     Idiopathic progressive neuropathy     Muscle weakness     Systemic lupus        Past Surgical History:   Procedure Laterality Date    SPINAL FUSION      provided by pt son       Family History: family history is not on file. Otherwise pertinent FHx was reviewed and not pertinent to current issue.    Social History:  reports that she has never smoked. She has never used smokeless tobacco. She reports that she does not drink alcohol and does not use drugs.    Home Medications:  Acetaminophen-DM, Baclofen, gabapentin, hydroxychloroquine, levothyroxine, metoprolol succinate XL, nystatin, ondansetron, potassium chloride, rOPINIRole, raloxifene, and  senna    Allergies:  Allergies   Allergen Reactions    Penicillins Unknown - Low Severity     Has tolerated Cephalexin and Ceftriaxone -Tom[facility] Cheek [facility]       Objective    Objective     Vitals:   Temp:  [97.4 °F (36.3 °C)-97.9 °F (36.6 °C)] 97.4 °F (36.3 °C)  Heart Rate:  [61-83] 70  Resp:  [24-26] 24  BP: ()/(36-99) 110/57  Flow (L/min):  [15] 15    Physical Exam:  Vital Signs Reviewed   General:  WDWN, confused, NAD.  Chronically ill-appearing elderly female lying in bed  HEENT:  PERRL, EOMI.  OP, nares clear, no sinus tenderness  Neck:  Supple, no JVD, no thyromegaly  Lymph: no axillary, cervical, supraclavicular lymphadenopathy noted bilaterally  Chest: Decreased aeration with rhonchi on auscultation, moderate work of breathing on NIPPV  CV: RRR, no MGR, pulses 2+, equal.  Abd:  Soft, NT, ND, + BS, no HSM, obese  EXT:  no clubbing, no cyanosis, bilateral lower extremity edema, no joint tenderness  Neuro:  A&Ox3, CN grossly intact, no focal deficits.  Skin: No rashes or lesions noted    Result Review    Result Review:  I have personally reviewed the results from the time of this admission to 2/17/2024 10:02 EST and agree with these findings:  [x]  Laboratory  [x]  Microbiology  [x]  Radiology  []  EKG/Telemetry   []  Cardiology/Vascular   []  Pathology  []  Old records  []  Other:  Most notable findings include:         Lab 02/17/24  0931 02/17/24  0635 02/17/24  0135 02/16/24  1923 02/16/24  1812   WBC  --  9.14  --   --  7.41   HEMOGLOBIN  --  12.8  --   --  12.9   HEMATOCRIT  --  44.2  --   --  45.6   PLATELETS  --  164  --   --  187   SODIUM  --  137  --   --  136   SODIUM, ARTERIAL 134.5*  --  133.6*  --   --    SODIUM, VENOUS  --   --   --  133.1*  --    POTASSIUM  --  4.9  --   --  5.8*   POTASSIUM, VENOUS  --   --   --  5.5*  --    CHLORIDE  --  93*  --   --  92*   CHLORIDE, VENOUS  --   --   --  90*  --    CO2  --  38.1*  --   --  41.7*   BUN  --  28*  --   --  27*   CREATININE  --  0.85  --    --  0.83   GLUCOSE  --  93  --   --  129*   GLUCOSE, ARTERIAL 144*  --  150* 113*  --    CALCIUM  --  9.4  --   --  9.1   PHOSPHORUS  --  2.9  --   --   --    TOTAL PROTEIN  --  5.4*  --   --  5.7*   ALBUMIN  --  2.8*  --   --  2.9*   GLOBULIN  --  2.6  --   --  2.8         Assessment & Plan   Assessment / Plan     Active Hospital Problems:  Active Hospital Problems    Diagnosis     **Acute metabolic encephalopathy      Impression:  Acute on chronic hypoxic and hypercapnic respiratory failure requiring NIPPV, 2 L is baseline  NSTEMI likely type II  CKD stage III  History of mycoplasma pneumonia  History of SLE  History of A-fib/flutter, not on anticoagulation    Plan:  -Continue to wean supplemental oxygen to maintain SpO2 greater than 90%.  Patient's baseline is 2 L  -Patient's mentation improving, but ABG obtained at 0930 was 7.3 3/83/149.  NIPPV settings adjusted at bedside to 14/7 with a rate of 20.  Repeat ABG was a venous draw will obtain repeat ABG.  -Encourage NIPPV wear at nighttime and as needed during naps  -CXR demonstrates pulmonary edema. BNP 19,598 on admission.  -2/17 echo pending.  12/14/2023 echo with EF of 61 to 65% grade 1 diastolic dysfunction  -Agree with spot diuresing with Lasix 40 mg IV  -Continue to monitor renal panel and electrolytes.  Replace electrolytes as necessary  -Micro negative to date.  Procalcitonin negative.  WBC is normal.  No need for antibiotics at this time  -Check strep pneumo, Legionella, and respiratory panel.  Cultures pending  -Start Brovana and Pulmicort  -Initiated bronchopulmonary hygiene  -Continue meropenem for UTI  -SLP on board, appreciate input  -Palliative care on board, appreciate input  -Recommend avoiding any sedating medications  Patient is DNR/DNI  Patient is critically ill with acute on chronic hypoxic and hypercapnic respiratory failure, altered mental status, increased work of breathing.  We had adjusted some BiPAP setting, will likely continue with  BiPAP through the day.  We will consider high flow nasal oxygen during daytime if improving.  Repeat arterial blood gas during daytime.  Critical care time spent 33 minutes excluding any procedures, by myself, Dr Judge.       DVT prophylaxis:  Medical DVT prophylaxis orders are present.      Code Status and Medical Interventions:   Ordered at: 02/17/24 0105     Medical Intervention Limits:    NO intubation (DNI)     Level Of Support Discussed With:    Health Care Surrogate     Code Status (Patient has no pulse and is not breathing):    No CPR (Do Not Attempt to Resuscitate)     Medical Interventions (Patient has pulse or is breathing):    Limited Support      Labs, imaging, notes and medications personally reviewed.  Discussed with primary and patient    Thank you for involving me in the care of this patient.    Electronically signed by PATRICK Snyder, 02/17/24, 10:13 AM EST.    This visit was performed by BOTH a physician and an APC. I personally evaluated and examined the patient. I performed all aspects of MDM as documented. , I have reviewed and confirmed the accuracy of the patient's history as documented in this note., and I have reexamined the patient and the results are consistent with the previously documented exam. I have updated the documentation as necessary.     Electronically signed by David Judge MD, 02/17/24, 5:23 PM EST.

## 2024-02-18 ENCOUNTER — APPOINTMENT (OUTPATIENT)
Dept: GENERAL RADIOLOGY | Facility: HOSPITAL | Age: 84
End: 2024-02-18
Payer: MEDICARE

## 2024-02-18 LAB
ANION GAP SERPL CALCULATED.3IONS-SCNC: 3.4 MMOL/L (ref 5–15)
ARTERIAL PATENCY WRIST A: ABNORMAL
BACTERIA SPEC AEROBE CULT: ABNORMAL
BASE EXCESS BLDA CALC-SCNC: 15.4 MMOL/L (ref -2–2)
BASOPHILS # BLD AUTO: 0.03 10*3/MM3 (ref 0–0.2)
BASOPHILS NFR BLD AUTO: 0.4 % (ref 0–1.5)
BDY SITE: ABNORMAL
BUN SERPL-MCNC: 25 MG/DL (ref 8–23)
BUN/CREAT SERPL: 32.5 (ref 7–25)
CA-I BLDA-SCNC: 1.13 MMOL/L (ref 1.13–1.32)
CALCIUM SPEC-SCNC: 8.8 MG/DL (ref 8.6–10.5)
CHLORIDE BLDA-SCNC: 91 MMOL/L (ref 98–106)
CHLORIDE SERPL-SCNC: 91 MMOL/L (ref 98–107)
CO2 SERPL-SCNC: 41.6 MMOL/L (ref 22–29)
COHGB MFR BLD: 0.3 % (ref 0–1.5)
CREAT SERPL-MCNC: 0.77 MG/DL (ref 0.57–1)
DEPRECATED RDW RBC AUTO: 52.7 FL (ref 37–54)
EGFRCR SERPLBLD CKD-EPI 2021: 75.7 ML/MIN/1.73
EOSINOPHIL # BLD AUTO: 0.11 10*3/MM3 (ref 0–0.4)
EOSINOPHIL NFR BLD AUTO: 1.5 % (ref 0.3–6.2)
ERYTHROCYTE [DISTWIDTH] IN BLOOD BY AUTOMATED COUNT: 14 % (ref 12.3–15.4)
FHHB: 4.5 % (ref 0–5)
GAS FLOW AIRWAY: 10 LPM
GEN 5 2HR TROPONIN T REFLEX: 152 NG/L
GLUCOSE BLDA-MCNC: 78 MG/DL (ref 65–99)
GLUCOSE BLDC GLUCOMTR-MCNC: 67 MG/DL (ref 70–99)
GLUCOSE BLDC GLUCOMTR-MCNC: 71 MG/DL (ref 70–99)
GLUCOSE BLDC GLUCOMTR-MCNC: 73 MG/DL (ref 70–99)
GLUCOSE BLDC GLUCOMTR-MCNC: 83 MG/DL (ref 70–99)
GLUCOSE BLDC GLUCOMTR-MCNC: 83 MG/DL (ref 70–99)
GLUCOSE BLDC GLUCOMTR-MCNC: 86 MG/DL (ref 70–99)
GLUCOSE BLDC GLUCOMTR-MCNC: 99 MG/DL (ref 70–99)
GLUCOSE SERPL-MCNC: 75 MG/DL (ref 65–99)
HCO3 BLDA-SCNC: 43.9 MMOL/L (ref 22–26)
HCT VFR BLD AUTO: 39.7 % (ref 34–46.6)
HGB BLD-MCNC: 11.6 G/DL (ref 12–15.9)
HGB BLDA-MCNC: 12.5 G/DL (ref 11.7–14.6)
IMM GRANULOCYTES # BLD AUTO: 0.03 10*3/MM3 (ref 0–0.05)
IMM GRANULOCYTES NFR BLD AUTO: 0.4 % (ref 0–0.5)
INHALED O2 CONCENTRATION: 75 %
LACTATE BLDA-SCNC: 1.33 MMOL/L (ref 0.5–2)
LYMPHOCYTES # BLD AUTO: 1.57 10*3/MM3 (ref 0.7–3.1)
LYMPHOCYTES NFR BLD AUTO: 21.4 % (ref 19.6–45.3)
MCH RBC QN AUTO: 29.6 PG (ref 26.6–33)
MCHC RBC AUTO-ENTMCNC: 29.2 G/DL (ref 31.5–35.7)
MCV RBC AUTO: 101.3 FL (ref 79–97)
METHGB BLD QL: 0.2 % (ref 0–1.5)
MODALITY: ABNORMAL
MONOCYTES # BLD AUTO: 0.88 10*3/MM3 (ref 0.1–0.9)
MONOCYTES NFR BLD AUTO: 12 % (ref 5–12)
NEUTROPHILS NFR BLD AUTO: 4.71 10*3/MM3 (ref 1.7–7)
NEUTROPHILS NFR BLD AUTO: 64.3 % (ref 42.7–76)
NOTE: ABNORMAL
NRBC BLD AUTO-RTO: 0 /100 WBC (ref 0–0.2)
OXYHGB MFR BLDV: 95 % (ref 94–99)
PCO2 BLDA: 75.2 MM HG (ref 35–45)
PH BLDA: 7.38 PH UNITS (ref 7.35–7.45)
PLATELET # BLD AUTO: 159 10*3/MM3 (ref 140–450)
PMV BLD AUTO: 11 FL (ref 6–12)
PO2 BLD: 108 MM[HG] (ref 0–500)
PO2 BLDA: 80.7 MM HG (ref 80–100)
POTASSIUM BLDA-SCNC: 3.73 MMOL/L (ref 3.5–5)
POTASSIUM SERPL-SCNC: 4 MMOL/L (ref 3.5–5.2)
RBC # BLD AUTO: 3.92 10*6/MM3 (ref 3.77–5.28)
SAO2 % BLDCOA: 95.5 % (ref 95–99)
SODIUM BLDA-SCNC: 136.7 MMOL/L (ref 136–146)
SODIUM SERPL-SCNC: 136 MMOL/L (ref 136–145)
TROPONIN T DELTA: -3 NG/L
TROPONIN T SERPL HS-MCNC: 142 NG/L
TROPONIN T SERPL HS-MCNC: 155 NG/L
WBC NRBC COR # BLD AUTO: 7.33 10*3/MM3 (ref 3.4–10.8)

## 2024-02-18 PROCEDURE — 25010000002 MEROPENEM PER 100 MG: Performed by: STUDENT IN AN ORGANIZED HEALTH CARE EDUCATION/TRAINING PROGRAM

## 2024-02-18 PROCEDURE — 25010000002 DIAZEPAM PER 5 MG

## 2024-02-18 PROCEDURE — 99232 SBSQ HOSP IP/OBS MODERATE 35: CPT | Performed by: INTERNAL MEDICINE

## 2024-02-18 PROCEDURE — 82948 REAGENT STRIP/BLOOD GLUCOSE: CPT | Performed by: STUDENT IN AN ORGANIZED HEALTH CARE EDUCATION/TRAINING PROGRAM

## 2024-02-18 PROCEDURE — 93010 ELECTROCARDIOGRAM REPORT: CPT | Performed by: SPECIALIST

## 2024-02-18 PROCEDURE — 84484 ASSAY OF TROPONIN QUANT: CPT | Performed by: INTERNAL MEDICINE

## 2024-02-18 PROCEDURE — 36600 WITHDRAWAL OF ARTERIAL BLOOD: CPT | Performed by: INTERNAL MEDICINE

## 2024-02-18 PROCEDURE — 71045 X-RAY EXAM CHEST 1 VIEW: CPT

## 2024-02-18 PROCEDURE — 85025 COMPLETE CBC W/AUTO DIFF WBC: CPT | Performed by: INTERNAL MEDICINE

## 2024-02-18 PROCEDURE — 80048 BASIC METABOLIC PNL TOTAL CA: CPT | Performed by: INTERNAL MEDICINE

## 2024-02-18 PROCEDURE — 94664 DEMO&/EVAL PT USE INHALER: CPT

## 2024-02-18 PROCEDURE — 82375 ASSAY CARBOXYHB QUANT: CPT | Performed by: INTERNAL MEDICINE

## 2024-02-18 PROCEDURE — 82805 BLOOD GASES W/O2 SATURATION: CPT | Performed by: INTERNAL MEDICINE

## 2024-02-18 PROCEDURE — 25010000002 HEPARIN (PORCINE) PER 1000 UNITS: Performed by: STUDENT IN AN ORGANIZED HEALTH CARE EDUCATION/TRAINING PROGRAM

## 2024-02-18 PROCEDURE — 25010000002 FUROSEMIDE PER 20 MG

## 2024-02-18 PROCEDURE — 94799 UNLISTED PULMONARY SVC/PX: CPT

## 2024-02-18 PROCEDURE — 0 DEXTROSE 5 % SOLUTION: Performed by: INTERNAL MEDICINE

## 2024-02-18 PROCEDURE — 82948 REAGENT STRIP/BLOOD GLUCOSE: CPT

## 2024-02-18 PROCEDURE — 99233 SBSQ HOSP IP/OBS HIGH 50: CPT | Performed by: INTERNAL MEDICINE

## 2024-02-18 PROCEDURE — 25010000002 MORPHINE PER 10 MG: Performed by: INTERNAL MEDICINE

## 2024-02-18 PROCEDURE — 83050 HGB METHEMOGLOBIN QUAN: CPT | Performed by: INTERNAL MEDICINE

## 2024-02-18 PROCEDURE — 92610 EVALUATE SWALLOWING FUNCTION: CPT

## 2024-02-18 PROCEDURE — 93005 ELECTROCARDIOGRAM TRACING: CPT

## 2024-02-18 RX ORDER — DIAZEPAM 5 MG/ML
2.5 INJECTION, SOLUTION INTRAMUSCULAR; INTRAVENOUS ONCE
Status: COMPLETED | OUTPATIENT
Start: 2024-02-19 | End: 2024-02-18

## 2024-02-18 RX ORDER — HALOPERIDOL 5 MG/ML
1 INJECTION INTRAMUSCULAR ONCE
Status: DISCONTINUED | OUTPATIENT
Start: 2024-02-18 | End: 2024-02-18

## 2024-02-18 RX ORDER — FUROSEMIDE 10 MG/ML
40 INJECTION INTRAMUSCULAR; INTRAVENOUS ONCE
Status: COMPLETED | OUTPATIENT
Start: 2024-02-19 | End: 2024-02-18

## 2024-02-18 RX ORDER — FUROSEMIDE 10 MG/ML
40 INJECTION INTRAMUSCULAR; INTRAVENOUS ONCE
Status: COMPLETED | OUTPATIENT
Start: 2024-02-18 | End: 2024-02-18

## 2024-02-18 RX ADMIN — Medication 10 ML: at 19:43

## 2024-02-18 RX ADMIN — DEXTROSE MONOHYDRATE 25 ML/HR: 50 INJECTION, SOLUTION INTRAVENOUS at 23:52

## 2024-02-18 RX ADMIN — MEROPENEM 500 MG: 500 INJECTION, POWDER, FOR SOLUTION INTRAVENOUS at 15:29

## 2024-02-18 RX ADMIN — DICLOFENAC SODIUM 2 G: 10 GEL TOPICAL at 19:42

## 2024-02-18 RX ADMIN — DICLOFENAC SODIUM 2 G: 10 GEL TOPICAL at 17:17

## 2024-02-18 RX ADMIN — MORPHINE SULFATE 1 MG: 2 INJECTION, SOLUTION INTRAMUSCULAR; INTRAVENOUS at 19:56

## 2024-02-18 RX ADMIN — DICLOFENAC SODIUM 2 G: 10 GEL TOPICAL at 11:34

## 2024-02-18 RX ADMIN — MEROPENEM 500 MG: 500 INJECTION, POWDER, FOR SOLUTION INTRAVENOUS at 00:06

## 2024-02-18 RX ADMIN — HEPARIN SODIUM 5000 UNITS: 5000 INJECTION INTRAVENOUS; SUBCUTANEOUS at 14:56

## 2024-02-18 RX ADMIN — HEPARIN SODIUM 5000 UNITS: 5000 INJECTION INTRAVENOUS; SUBCUTANEOUS at 05:49

## 2024-02-18 RX ADMIN — ASPIRIN 300 MG: 300 SUPPOSITORY RECTAL at 08:24

## 2024-02-18 RX ADMIN — MORPHINE SULFATE 1 MG: 2 INJECTION, SOLUTION INTRAMUSCULAR; INTRAVENOUS at 05:49

## 2024-02-18 RX ADMIN — MEROPENEM 500 MG: 500 INJECTION, POWDER, FOR SOLUTION INTRAVENOUS at 08:24

## 2024-02-18 RX ADMIN — DIAZEPAM 2.5 MG: 5 INJECTION, SOLUTION INTRAMUSCULAR; INTRAVENOUS at 23:52

## 2024-02-18 RX ADMIN — FUROSEMIDE 40 MG: 10 INJECTION, SOLUTION INTRAMUSCULAR; INTRAVENOUS at 23:15

## 2024-02-18 RX ADMIN — DICLOFENAC SODIUM 2 G: 10 GEL TOPICAL at 08:24

## 2024-02-18 RX ADMIN — MORPHINE SULFATE 1 MG: 2 INJECTION, SOLUTION INTRAMUSCULAR; INTRAVENOUS at 10:39

## 2024-02-18 RX ADMIN — ARFORMOTEROL TARTRATE 15 MCG: 15 SOLUTION RESPIRATORY (INHALATION) at 07:34

## 2024-02-18 RX ADMIN — BUDESONIDE 0.5 MG: 0.5 INHALANT ORAL at 07:33

## 2024-02-18 RX ADMIN — FUROSEMIDE 40 MG: 10 INJECTION, SOLUTION INTRAMUSCULAR; INTRAVENOUS at 11:33

## 2024-02-18 RX ADMIN — BUDESONIDE 0.5 MG: 0.5 INHALANT ORAL at 19:17

## 2024-02-18 RX ADMIN — HEPARIN SODIUM 5000 UNITS: 5000 INJECTION INTRAVENOUS; SUBCUTANEOUS at 19:43

## 2024-02-18 RX ADMIN — ARFORMOTEROL TARTRATE 15 MCG: 15 SOLUTION RESPIRATORY (INHALATION) at 19:17

## 2024-02-18 RX ADMIN — MEROPENEM 500 MG: 500 INJECTION, POWDER, FOR SOLUTION INTRAVENOUS at 23:28

## 2024-02-18 RX ADMIN — MORPHINE SULFATE 1 MG: 2 INJECTION, SOLUTION INTRAMUSCULAR; INTRAVENOUS at 01:39

## 2024-02-18 NOTE — THERAPY EVALUATION
Acute Care - Speech Language Pathology   Swallow Initial Evaluation SCOTTIE Palm     Patient Name: Marylou Levin  : 1940  MRN: [id number]  Today's Date: 2024               Admit Date: 2024    Visit Dx:     ICD-10-CM ICD-9-CM   1. Acute respiratory failure with hypoxia  J96.01 518.81   2. Acute pulmonary edema  J81.0 518.4   3. Urinary tract infection without hematuria, site unspecified  N39.0 599.0   4. Hyperkalemia  E87.5 276.7   5. Elevated troponin  R79.89 790.6   6. Oropharyngeal dysphagia  R13.12 787.22     Patient Active Problem List   Diagnosis    Dyspnea    Acute-on-chronic respiratory failure    Pneumonia due to infectious organism    Hypothyroidism (acquired)    History of dementia    Pneumonia due to Mycoplasma pneumoniae    Asthma    Hyperlipidemia    Hypertension    Rheumatoid arthritis    Stage 4 chronic kidney disease    Systemic lupus erythematosus    Acute metabolic encephalopathy     Past Medical History:   Diagnosis Date    Atrial fibrillation     Dementia     pt son    Difficulty walking     Hypokalemia     Hypothyroidism     Idiopathic progressive neuropathy     Muscle weakness     Systemic lupus      Past Surgical History:   Procedure Laterality Date    SPINAL FUSION      provided by pt son           Inpatient Speech Pathology Dysphagia Evaluation        PAIN SCALE: None indicated.    PRECAUTIONS/CONTRAINDICATIONS: Standard    SUSPECTED ABUSE/NEGLECT/EXPLOITATION: None indicated.    SOCIAL/PSYCHOLOGICAL NEEDS/BARRIERS: None indicated.    PAST SOCIAL HISTORY: 83-year-old female nursing home resident    PRIOR FUNCTION: Not fully determined    PATIENT GOALS/EXPECTATIONS: Patient did not verbalize    HISTORY: 83-year-old female the above diagnosis is referred for speech therapy evaluation to assess for swallowing.  Patient was on BiPAP, now on nasal cannula.  Patient did state feeling nervous about eating.  Patient has been seen by speech pathology services in the past.    CURRENT  DIET LEVEL: N.p.o.    OBJECTIVE:    TEST ADMINISTERED: Clinical dysphagia evaluation    COGNITION/SAFETY AWARENESS: Impaired, patient did follow basic commands and attempt to respond to questions.    BEHAVIORAL OBSERVATIONS: Alert and cooperative    ORAL MOTOR EXAM: Patient with minimal dentition.  Mild decreased oral motor strength/range of motion, 4/5.    VOICE QUALITY: Adequate    REFLEX EXAM: Patient demonstrated cough    POSTURE: Assisted sitting upright in bed    FEEDING/SWALLOWING FUNCTION: Assessed with nectar liquid, thin liquids, puréed solids, crunchy solid.    CLINICAL OBSERVATIONS: Nectar liquid by spoon and by cup with delay, vocal quality remaining clear to cervical auscultation.  Thin liquid by cup and by straw with delay, vocal quality remaining clear to cervical auscultation.  Purée solid with swallow completed with laryngeal elevation noted to palpation.  Crunchy solid with extended chewing followed by swallow completed, oral residue noted, cough noted x 1.  Additional trials of thin liquid by straw with no overt clinical signs or symptoms of aspiration noted.    DYSPHAGIA CRITERIA: Swallow delay, decreased oral prep, risk of aspiration.    FUNCTIONAL ASSESSMENT INSTRUMENT: Patient currently scored a level 5 of 7 on Functional Communication Measures for swallowing indicating a 20 to 39% limitation in function.    ASSESSMENT/ PLAN OF CARE:  Pt presents with limitations, noted below, that impede her ability to swallow safely and maintain nutrition. The skills of a therapist will be required to safely and effectively implement the following treatment plan to restore maximal level of function.    PROBLEMS:  1.  Swallow delay, risk of aspiration                       LTG 1: 30 days: Patient will tolerate least restrictive diet appropriate positioning and strategies with minimal assistance.                       STG 1a: 14 days: Patient will tolerate diet of puréed solids and thin liquids with minimal  assistance for strategies.                       STG 1b: 14 days: Patient will tolerate 8 of 10 trials of thin liquids with min to no signs or symptoms of aspiration.                       STG 1c: 14 days: Patient will demonstrate adequate chewing and swallowing 8 of 10 trials of soft solid.                       STG 1d: 14 days: Patient/family education.                       TREATMENT: Dysphagia therapy to address swallow function through exercises and education of strategies.     FREQUENCY/DURATION: Twice daily 5 times per week    REHAB POTENTIAL:  Pt has fair to good rehab potential.  The following limitations may influence improvement/ length of tx: Medical status.    RECOMMENDATIONS:   1.   DIET: Purée solid, thin liquid.    2.  POSITION: Positioning fully upright for all p.o. intake and 30 minutes following.    3.  COMPENSATORY STRATEGIES: One-to-one assist patient to feed self.  Alternate small bites and small sips of solids and liquids at a slow rate.  Medications whole in applesauce.    Pt/responsible party agrees with plan of care and has been informed of all alternatives, risks and benefits.                              Anticipated Discharge Disposition (SLP): skilled nursing facility, extended care facility (02/18/24 1118)                                                               EDUCATION  The patient has been educated in the following areas:   Modified Diet Instruction.              Time Calculation:    Time Calculation- SLP       Row Name 02/18/24 1118             Time Calculation- SLP    SLP Start Time 0915  -TB      SLP Stop Time 1015  -TB      SLP Time Calculation (min) 60 min  -TB      SLP Received On 02/18/24  -TB         Untimed Charges    SLP Eval/Re-eval  ST Eval Oral Pharyng Swallow - 54092  -TB      09891-RO Eval Oral Pharyng Swallow Minutes 60  -TB         Total Minutes    Untimed Charges Total Minutes 60  -TB       Total Minutes 60  -TB                User Key  (r) = Recorded By, (t)  = Taken By, (c) = Cosigned By      Initials Name Provider Type    TB Chrissie Fonseca SLP Speech and Language Pathologist                    Therapy Charges for Today       Code Description Service Date Service Provider Modifiers Qty    [id number]  ST EVAL ORAL PHARYNG SWALLOW 4 2/18/2024 Chrissie Fonseca SLP GN 1                 EMMY Rabago  2/18/2024

## 2024-02-18 NOTE — PROGRESS NOTES
[facility]   Hospitalist Progress Note  Date: 2024  Patient Name: Marylou Levin  : 1940  MRN: [id number]  Date of admission: 2024  Room/Bed: [facility]      Subjective   Subjective     Chief Complaint: Decreased responsiveness    Summary:Marylou Levin is a 83 y.o. female  with a past medical history of dementia, hypothyroidism, SLE, dementia, presented to the ED for evaluation of decreased responsiveness.  As per the reports present to the ED physician, patient has a steady decline over the last several weeks.  Currently staying at a nursing facility.  Patient has been nonambulatory in the past 6 months.  Usually patient noted to have some intermittent confusion but able to have conversation.  For the last 2 days patient has significant decreased in response.  Only responding to the physical symptoms.  EMS was called.  Patient noted to be hypotensive and hypoxic, started on supplemental oxygen.  Patient is unable to provide any history at this time.  Intermittently responsive.  Incoherent speech.     In the ED, vital signs temperature 91.9, pulse 79, respiratory 26, blood pressure 90s over 60s, on nonrebreather 15 L saturating around 90%.  Labs initial troponin 141, repeat troponin 146, proBNP 1 1998, VBG showed 7.25/88, sodium 136, potassium 5.8, chloride 92, bicarb 41.7, BUN 27, creatinine 0.83, rest of the CMP with no significant findings, normal ammonia, normal lactic acid, procalcitonin 0.04, urinalysis showed 21-50 WBC, 4+ bacteria, blood cultures and urine cultures in process.  COVID flu RSV negative.  Chest x-ray showed slight increase in the effusion and consolidation in the right lung bases compared to the chest x-ray on 2023.  CT head without contrast showed no acute findings.  Opacification of the mastoid air cells and middle ear cavities may reflect otomastoiditis.  Received ceftriaxone in the ED.  Patient has been admitted for further evaluation and management of acute  metabolic encephalopathy, acute hypoxic and hypercapnic respiratory failure, UTI, hyperkalemia.    Interval Followup: Patient seen evaluated on this morning.  Patient with no complaints at the time of evaluation.  Patient RN reports patient was off of BiPAP overnight.    Review of Systems    All systems reviewed and negative except for what is outlined above.      Objective   Objective     Vitals:   Temp:  [97.4 °F (36.3 °C)-98.8 °F (37.1 °C)] 98.8 °F (37.1 °C)  Heart Rate:  [72-96] 72  Resp:  [20-24] 20  BP: ()/(42-74) 90/42  Flow (L/min):  [10-12] 10    Physical Exam   General: Awake, alert, NAD  HENT: NCAT, MMM  Eyes: pupils equal, no scleral icterus  Cardiovascular: RRR, no patient will murmurs   Pulmonary: Improved air movement bilaterally; no wheezes  Gastrointestinal: S/ND/NT, +BS  Musculoskeletal: No gross deformities  Skin: No jaundice, no rash on exposed skin appreciated  Neuro: CN II through XII grossly intact;  no tremor  Psych: Mood and affect appropriate  : No Ontiveros catheter; no suprapubic tenderness    Result Review    Result Review:  I have personally reviewed these results:  [x]  Laboratory      Lab 02/18/24  0735 02/18/24  0406 02/17/24  0931 02/17/24  0635 02/17/24  0135 02/16/24  1923 02/16/24  1812   WBC  --  7.33  --  9.14  --   --  7.41   HEMOGLOBIN  --  11.6*  --  12.8  --   --  12.9   HEMATOCRIT  --  39.7  --  44.2  --   --  45.6   PLATELETS  --  159  --  164  --   --  187   NEUTROS ABS  --  4.71  --  6.19  --   --  5.15   IMMATURE GRANS (ABS)  --  0.03  --  0.05  --   --  0.03   LYMPHS ABS  --  1.57  --  1.68  --   --  1.29   MONOS ABS  --  0.88  --  1.12*  --   --  0.89   EOS ABS  --  0.11  --  0.07  --   --  0.03   MCV  --  101.3*  --  104.7*  --   --  104.8*   PROCALCITONIN  --   --   --   --   --   --  0.04   LACTATE  --   --   --   --   --   --  1.5   LACTATE, ARTERIAL 1.33  --  1.46  --  1.71   < >  --    PROTIME  --   --   --   --   --   --  17.9*    < > = values in this  interval not displayed.         Lab 02/18/24  0735 02/18/24  0406 02/17/24  0931 02/17/24  0635 02/17/24  0135 02/16/24 1924 02/16/24 1923 02/16/24 1923 02/16/24 1812   SODIUM  --  136  --  137  --   --   --   --  136   SODIUM, ARTERIAL 136.7  --  134.5*  --  133.6*  --    < >  --   --    SODIUM, VENOUS  --   --   --   --   --   --   --  133.1*  --    POTASSIUM  --  4.0  --  4.9  --   --   --   --  5.8*   POTASSIUM, VENOUS  --   --   --   --   --   --   --  5.5*  --    CHLORIDE  --  91*  --  93*  --   --   --   --  92*   CHLORIDE, VENOUS  --   --   --   --   --   --   --  90*  --    CO2  --  41.6*  --  38.1*  --   --   --   --  41.7*   ANION GAP  --  3.4*  --  5.9  --   --   --   --  2.3*   BUN  --  25*  --  28*  --   --   --   --  27*   CREATININE  --  0.77  --  0.85  --   --   --   --  0.83   EGFR  --  75.7  --  67.2  --   --   --   --  69.2   GLUCOSE  --  75  --  93  --   --   --   --  129*   GLUCOSE, ARTERIAL 78  --  144*  --  150*  --    < > 113*  --    CALCIUM  --  8.8  --  9.4  --   --   --   --  9.1   IONIZED CALCIUM 1.13  --  1.20  --  1.19  --    < > 1.14  --    MAGNESIUM  --   --   --  1.6  --   --   --   --   --    PHOSPHORUS  --   --   --  2.9  --   --   --   --   --    TSH  --   --   --   --   --  1.960  --   --   --     < > = values in this interval not displayed.         Lab 02/17/24  0635 02/16/24  1812   TOTAL PROTEIN 5.4* 5.7*   ALBUMIN 2.8* 2.9*   GLOBULIN 2.6 2.8   ALT (SGPT) 12 13   AST (SGOT) 20 21   BILIRUBIN 0.4 0.3   ALK PHOS 58 69         Lab 02/18/24  0634 02/18/24  0406 02/17/24  0635 02/16/24  1924 02/16/24  1812   PROBNP  --   --   --   --  19,598.0*   HSTROP T 152* 155* 136*   < > 147*   PROTIME  --   --   --   --  17.9*   INR  --   --   --   --  1.45*    < > = values in this interval not displayed.                 Lab 02/18/24  0735 02/17/24  1444 02/17/24  0931   PH, ARTERIAL 7.384 7.385 7.334*   PCO2, ARTERIAL 75.2* 66.3* 83.5*   PO2 ART 80.7 140.3* 149.7*   O2 SATURATION ART  95.5 98.7 98.8   FIO2 75 70 80   HCO3 ART 43.9* 38.8* 43.4*   BASE EXCESS ART 15.4* 11.1* 13.6*   CARBOXYHEMOGLOBIN 0.3 0.4 0.4     Brief Urine Lab Results  (Last result in the past 365 days)        Color   Clarity   Blood   Leuk Est   Nitrite   Protein   CREAT   Urine HCG        02/1940 Dark Yellow   Turbid   Trace   Large (3+)   Negative   30 mg/dL (1+)                 [x]  Microbiology   Microbiology Results (last 10 days)       Procedure Component Value - Date/Time    Respiratory Panel PCR w/COVID-19(SARS-CoV-2) ANGELICA/PHI/PATTI/PAD/COR/TK In-House, NP Swab in UTM/VTM, 2 HR TAT - Swab, Nasopharynx [[id number]]  (Normal) Collected: 02/17/24 1211    Lab Status: Final result Specimen: Swab from Nasopharynx Updated: 02/17/24 1304     ADENOVIRUS, PCR Not Detected     Coronavirus 229E Not Detected     Coronavirus HKU1 Not Detected     Coronavirus NL63 Not Detected     Coronavirus OC43 Not Detected     COVID19 Not Detected     Human Metapneumovirus Not Detected     Human Rhinovirus/Enterovirus Not Detected     Influenza A PCR Not Detected     Influenza B PCR Not Detected     Parainfluenza Virus 1 Not Detected     Parainfluenza Virus 2 Not Detected     Parainfluenza Virus 3 Not Detected     Parainfluenza Virus 4 Not Detected     RSV, PCR Not Detected     Bordetella pertussis pcr Not Detected     Bordetella parapertussis PCR Not Detected     Chlamydophila pneumoniae PCR Not Detected     Mycoplasma pneumo by PCR Not Detected    Narrative:      In the setting of a positive respiratory panel with a viral infection PLUS a negative procalcitonin without other underlying concern for bacterial infection, consider observing off antibiotics or discontinuation of antibiotics and continue supportive care. If the respiratory panel is positive for atypical bacterial infection (Bordetella pertussis, Chlamydophila pneumoniae, or Mycoplasma pneumoniae), consider antibiotic de-escalation to target atypical bacterial infection.    S.  Pneumo Ag Urine or CSF - Urine, Urine, Clean Catch [[id number]]  (Normal) Collected: 02/17/24 1210    Lab Status: Final result Specimen: Urine, Clean Catch Updated: 02/17/24 1228     Strep Pneumo Ag Negative    Legionella Antigen, Urine - Urine, Urine, Clean Catch [[id number]]  (Normal) Collected: 02/17/24 1210    Lab Status: Final result Specimen: Urine, Clean Catch Updated: 02/17/24 1229     LEGIONELLA ANTIGEN, URINE Negative    COVID-19, FLU A/B, RSV PCR 1 HR TAT - Swab, Nasopharynx [[id number]]  (Normal) Collected: 02/16/24 1950    Lab Status: Final result Specimen: Swab from Nasopharynx Updated: 02/16/24 2040     COVID19 Not Detected     Influenza A PCR Not Detected     Influenza B PCR Not Detected     RSV, PCR Not Detected    Narrative:      Fact sheet for providers: https://www.fda.gov/media/680379/download    Fact sheet for patients: https://www.fda.gov/media/644366/download    Test performed by PCR.    Blood Culture - Blood, Arm, Right [[id number]]  (Normal) Collected: 02/16/24 1946    Lab Status: Preliminary result Specimen: Blood from Arm, Right Updated: 02/17/24 2001     Blood Culture No growth at 24 hours    Blood Culture - Blood, Arm, Right [[id number]]  (Abnormal) Collected: 02/16/24 1946    Lab Status: Preliminary result Specimen: Blood from Arm, Right Updated: 02/18/24 0341     Blood Culture Abnormal Stain     Gram Stain Aerobic Bottle Gram positive cocci in clusters      Anaerobic Bottle Gram positive cocci in clusters    Blood Culture ID, PCR - Blood, Arm, Right [[id number]]  (Abnormal) Collected: 02/16/24 1946    Lab Status: Final result Specimen: Blood from Arm, Right Updated: 02/17/24 1922     BCID, PCR Staph spp, not aureus or lugdunensis. Identification by BCID2 PCR.     BOTTLE TYPE Aerobic Bottle    Urine Culture - Urine, Urine, Clean Catch [[id number]]  (Abnormal) Collected: 02/1940    Lab Status: Preliminary result Specimen: Urine, Clean Catch Updated: 02/17/24 1442     Urine Culture  >100,000 CFU/mL Gram Negative Bacilli    Narrative:      Colonization of the urinary tract without infection is common. Treatment is discouraged unless the patient is symptomatic, pregnant, or undergoing an invasive urologic procedure.          [x]  Radiology  XR Chest 1 View    Result Date: 2/17/2024    1. Mild-to-moderate bilateral perihilar and basilar airspace opacity suspected to represent pulmonary edema.  Superimposed pneumonia is not excluded 2. Suspected small bilateral pleural effusions       Tavo Law M.D.       Electronically Signed and Approved By: Tavo Law M.D. on 2/17/2024 at 8:14             CT Head Without Contrast    Result Date: 2/16/2024   1. No acute findings in the brain. 2. Opacification of the mastoid air cells and middle ear cavities may reflect otomastoiditis.      SIMON MCDONNELL MD       Electronically Signed and Approved By: SIMON MCDONNELL MD on 2/16/2024 at 22:17            []  EKG/Telemetry   [x]  Cardiology/Vascular     2D echo    Left ventricular ejection fraction appears to be 56 - 60%.    Left ventricular wall thickness is consistent with septal asymmetric hypertrophy.    Left ventricular diastolic function is consistent with (grade I) impaired relaxation and age.    The right ventricular cavity is mildly dilated.    Mild to moderate tricuspid regurgitation.    []  Pathology  []  Old records  []  Other:    Assessment & Plan   Assessment / Plan     Assessment:  Acute metabolic encephalopathy DDX: UTI, worsening dementia, hypercapnia  Acute hypoxic and hypercapnic respiratory failure  E. coli UTI  Gram-positive cocci bacteremia 1/2 cultures positive  Hyperkalemia.  Resolved  Elevated troponin likely NSTEMI type II from hypoxia, CHF exacerbation  Acute on chronic diastolic congestive heart failure  Hypothyroidism  History of SLE  Mild to moderate mitral valve regurgitation    Plan:  Continue to monitor on telemetry  Follow-up on blood cultures  Patient has history of ESBL in  2019, received ceftriaxone in the ED. subsequently switched to meropenem.  De-escalate antibiotics based on the culture data  Continue BiPAP at bedtime  Pulmonology consulted for further assistance in management.  Consult is greatly appreciated.    TSH within normal limits  2D echo results noted.  Continue Lasix as needed.  Monitor strict I's and O's and daily weights  All eval performed this morning patient initiated on diet.  POCT glucose every 2 hours  Palliative care consult when services available     Discussed with RN.    DVT prophylaxis:  Medical DVT prophylaxis orders are present.        CODE STATUS:   Medical Intervention Limits: NO intubation (DNI)  Level Of Support Discussed With: Health Care Surrogate  Code Status (Patient has no pulse and is not breathing): No CPR (Do Not Attempt to Resuscitate)  Medical Interventions (Patient has pulse or is breathing): Limited Support      Electronically signed by Jimi Lerma MD, 2/18/2024, 10:51 EST.

## 2024-02-18 NOTE — PLAN OF CARE
Goal Outcome Evaluation:      Soon as I placed patient on the bipap she began crying with every breath. She kept it on for maybe 5 mins then pulled it off. I informed RN if they wanted her on it she would need a sitter. I placed her back on 12l HFNC. Still unable to  spo2.

## 2024-02-18 NOTE — PROGRESS NOTES
Respiratory Therapist Broncho-Pulmonary Hygiene Progress Note      Patient Name:  Marylou Levin  YOB: 1940    Marylou Levin meets the qualification for Level 3 of the Bronco-Pulmonary Hygiene Protocol. This was based on my daily patient assessment and includes review of chest x-ray results, cough ability and quality, oxygenation, secretions or risk for secretion development and patient mobility.     Broncho-Pulmonary Hygiene Assessment:    Level of Movement: Actively changing positions-requires assistance  Disoriented/Follows Commands    Breath Sounds: Diminished and/or coarse rhonchi    Cough: Ineffective, weak, frequent    Chest X-Ray: Mild consolidation and/or atelectasis.    Sputum Productions: None or small amount of thin or watery secretions with effective cough    History and Physical: New onset of bronchitis or existing chronic pulmonary conditions.  **(not in an exacerbation)    SpO2 to Oxygen Need: greater than 90% on  greater than 6L, Vapotherm, oxygen mask greater than 40% or ventilator    Current SpO2 is: 95 on 10L HF    Based on this information, I have completed the following interventions: CPT (precussor)      Electronically signed by Natasha Vicente RRT, 02/18/24, 11:07 AM EST.

## 2024-02-18 NOTE — THERAPY EVALUATION
RT EQUIPMENT DEVICE RELATED - SKIN ASSESSMENT    RT Medical Equipment/Device:     NIV Mask:  Under-the-nose   size:       Skin Assessment:      Cheek:  Intact  Chin:  Intact  Neck:  Intact  Nose:  Intact    Device Skin Pressure Protection:  Positioning supports utilized and Pressure points protected    Nurse Notification:  Marce Vicente, RRT

## 2024-02-18 NOTE — PLAN OF CARE
Goal Outcome Evaluation:           Progress: improving     Patient more alert throughout shift, able to have a conversation but still confused. Patient tolerating puree thin liquid diet well. Blood glucose 67-85 throughout shift, encouraging patient to eat more, D5W infusing at 25ml/hr.

## 2024-02-18 NOTE — PLAN OF CARE
Goal Outcome Evaluation:  Plan of Care Reviewed With: patient      ASSESSMENT/ PLAN OF CARE:  Pt presents with limitations, noted below, that impede her ability to swallow safely and maintain nutrition. The skills of a therapist will be required to safely and effectively implement the following treatment plan to restore maximal level of function.    PROBLEMS:  1.  Swallow delay, risk of aspiration                                             TREATMENT: Dysphagia therapy to address swallow function through exercises and education of strategies.     FREQUENCY/DURATION: Twice daily 5 times per week    REHAB POTENTIAL:  Pt has fair to good rehab potential.  The following limitations may influence improvement/ length of tx: Medical status.    RECOMMENDATIONS:   1.   DIET: Purée solid, thin liquid.    2.  POSITION: Positioning fully upright for all p.o. intake and 30 minutes following.    3.  COMPENSATORY STRATEGIES: One-to-one assist patient to feed self.  Alternate small bites and small sips of solids and liquids at a slow rate.  Medications whole in applesauce.            Anticipated Discharge Disposition (SLP): skilled nursing facility, extended care facility

## 2024-02-18 NOTE — PROGRESS NOTES
Pulmonary / Critical Care Progress Note      Patient Name: Marylou Levin  : 1940  MRN: [id number]  Primary Care Physician:  Brady Rae MD  Date of admission: 2024    Subjective   Subjective   Follow-up for hypoxic and hypercapnic respiratory failure requiring NIPPV    Over past 24 hours: Remains on Brovana, Pulmicort, and DuoNebs.  Continues meropenem course for UTI    No acute events overnight.     This morning,   On 10 L high flow nasal cannula  Lying in bed  Reports feeling better today  Patient still somewhat confused  Denies chest pain or chest tightness  Denies cough  Remains weak and fatigued    Review of Systems  General:  + Fatigue, No Fever,   HEENT: No dysphagia, No Visual Changes, no rhinorrhea  Respiratory: Unproductive cough,+Dyspnea, - phlegm, No Pleuritic Pain, - wheezing, no hemoptysis  Cardiovascular: Denies chest pain, denies palpitations,+GARNER, No Chest Pressure  Gastrointestinal:  No Abdominal Pain, No Nausea, No Vomiting, No Diarrhea  Genitourinary:  No Dysuria, No Frequency, No Hesitancy  Musculoskeletal: No muscle pain or swelling  Endocrine:  No Heat Intolerance, No Cold Intolerance  Hematologic:  No Bleeding, No Bruising  Psychiatric:  No Anxiety, No Depression  Neurologic:  No Confusion, no Dysarthria, No Headaches  Skin:  No Rash, No Open Wounds    Objective   Objective     Vitals:   Temp:  [97.4 °F (36.3 °C)-98.5 °F (36.9 °C)] 98.2 °F (36.8 °C)  Heart Rate:  [87-96] 96  Resp:  [20-24] 20  BP: (103-118)/(48-74) 110/74  Flow (L/min):  [11-12] 12  Physical Exam   Vital Signs Reviewed   General:  WDWN, Alert, NAD.  Chronically ill-appearing elderly woman, lying in bed  HEENT:  PERRL, EOMI.  OP, nares clear, no sinus tenderness  Neck:  Supple, no JVD, no thyromegaly  Chest: Decreased aeration with diminished/coarse crackles on auscultation bilaterally, some work of breathing noted on 10 L high flow  CV: RRR, no MGR, pulses 2+, equal.  Abd:  Soft, NT, ND, + BS, no  HSM, obese  EXT:  no clubbing, no cyanosis, no edema  Neuro:  A&Ox1, CN grossly intact, no focal deficits.  Skin: No rashes or lesions noted    Result Review    Result Review:  I have personally reviewed the results from the time of this admission to 2/18/2024 06:58 EST and agree with these findings:  [x]  Laboratory  [x]  Microbiology  [x]  Radiology  []  EKG/Telemetry   []  Cardiology/Vascular   []  Pathology  []  Old records  []  Other:  Most notable findings include:     2/17 echo pending.  12/14/2023 echo with EF of 61 to 65% grade 1 diastolic dysfunction        Lab 02/18/24  0735 02/18/24  0406 02/17/24  0931 02/17/24  0635 02/17/24  0135 02/16/24  1923 02/16/24  1812   WBC  --  7.33  --  9.14  --   --  7.41   HEMOGLOBIN  --  11.6*  --  12.8  --   --  12.9   HEMATOCRIT  --  39.7  --  44.2  --   --  45.6   PLATELETS  --  159  --  164  --   --  187   SODIUM  --  136  --  137  --   --  136   SODIUM, ARTERIAL 136.7  --  134.5*  --  133.6*  --   --    SODIUM, VENOUS  --   --   --   --   --  133.1*  --    POTASSIUM  --  4.0  --  4.9  --   --  5.8*   POTASSIUM, VENOUS  --   --   --   --   --  5.5*  --    CHLORIDE  --  91*  --  93*  --   --  92*   CHLORIDE, VENOUS  --   --   --   --   --  90*  --    CO2  --  41.6*  --  38.1*  --   --  41.7*   BUN  --  25*  --  28*  --   --  27*   CREATININE  --  0.77  --  0.85  --   --  0.83   GLUCOSE  --  75  --  93  --   --  129*   GLUCOSE, ARTERIAL 78  --  144*  --  150* 113*  --    CALCIUM  --  8.8  --  9.4  --   --  9.1   PHOSPHORUS  --   --   --  2.9  --   --   --    TOTAL PROTEIN  --   --   --  5.4*  --   --  5.7*   ALBUMIN  --   --   --  2.8*  --   --  2.9*   GLOBULIN  --   --   --  2.6  --   --  2.8       Assessment & Plan   Assessment / Plan     Active Hospital Problems:  Active Hospital Problems    Diagnosis     **Acute metabolic encephalopathy      Impression:   Acute on chronic hypoxic and hypercapnic respiratory failure requiring NIPPV, 2 L is baseline  NSTEMI likely  type II  CKD stage III  History of mycoplasma pneumonia  History of SLE  History of A-fib/flutter, not on anticoagulation    Plan:   -Currently on 10 L high flow nasal cannula.  Continue to wean supplemental oxygen to maintain SpO2 greater than 90%.  Patient's baseline is 2 L  -Encourage NIPPV wear at nighttime and as needed with naps  -Patient still somewhat confused this morning.  Patient knows that she is in [place].  Will recheck ABG  -ABG reviewed, 7.3 8/75/80<--7.3 8/66/140  -Redose Lasix 40 mg IV x 1  -Continue to monitor renal panel and electrolytes.  Replace electrolytes as necessary  -Micro negative to date  -Continue Brovana and Pulmicort  -Continue bronchopulmonary hygiene.  Encourage I-S and flutter when patient more alert  -Continue meropenem for UTI  -SLP on board, appreciate input  -Palliative care on board, appreciate input  -Recommend avoiding any sedating medications  Patient is DNR/DNI       DVT prophylaxis:  Medical DVT prophylaxis orders are present.    Patient is critically ill with acute on chronic hypoxic and hypercapnic respiratory failure, altered mental status, increased work of breathing.  We had adjusted some BiPAP setting, will likely continue with BiPAP through the day.  We will consider high flow nasal oxygen during daytime if improving.  Repeat arterial blood gas during daytime.  Critical care time spent 33 minutes excluding any procedures, by myself, Dr Judge.      CODE STATUS:   Medical Intervention Limits: NO intubation (DNI)  Level Of Support Discussed With: Health Care Surrogate  Code Status (Patient has no pulse and is not breathing): No CPR (Do Not Attempt to Resuscitate)  Medical Interventions (Patient has pulse or is breathing): Limited Support    I personally reviewed pertinent labs, imaging and provider notes. Discussed with bedside nurse and will discuss with primary service.     Electronically signed by PATRICK Snyder, 02/18/24, 9:26 AM  EST.    Electronically signed by David Judge MD, 2/18/2024, 06:58 EST.    This visit was performed by BOTH a physician and an APC. I personally evaluated and examined the patient. I performed all aspects of MDM as documented. , I have reviewed and confirmed the accuracy of the patient's history as documented in this note., and I have reexamined the patient and the results are consistent with the previously documented exam. I have updated the documentation as necessary.     Electronically signed by David Judge MD, 02/18/24, 3:25 PM EST.

## 2024-02-18 NOTE — PLAN OF CARE
Goal Outcome Evaluation:  Plan of Care Reviewed With: patient        Progress: no change  Outcome Evaluation: Patient was found on 10L hf this am. ABG obtaiend per Dr Judge to check CO2. Patient is compensated therefore Dr Judge is okay with her wearing bipap at night and not continuously. Patient sat on 10L HF was 95%. Patient tolerating well. Settings are 20/8 and R-16

## 2024-02-19 LAB
ANION GAP SERPL CALCULATED.3IONS-SCNC: 8.8 MMOL/L (ref 5–15)
BUN SERPL-MCNC: 21 MG/DL (ref 8–23)
BUN/CREAT SERPL: 31.3 (ref 7–25)
CA-I BLDA-SCNC: 0.99 MMOL/L (ref 1.13–1.32)
CALCIUM SPEC-SCNC: 8.5 MG/DL (ref 8.6–10.5)
CHLORIDE SERPL-SCNC: 85 MMOL/L (ref 98–107)
CO2 SERPL-SCNC: 43.2 MMOL/L (ref 22–29)
CREAT SERPL-MCNC: 0.67 MG/DL (ref 0.57–1)
EGFRCR SERPLBLD CKD-EPI 2021: 85.8 ML/MIN/1.73
GLUCOSE SERPL-MCNC: 132 MG/DL (ref 65–99)
MAGNESIUM SERPL-MCNC: 0.7 MG/DL (ref 1.6–2.4)
PHOSPHATE SERPL-MCNC: 1.3 MG/DL (ref 2.5–4.5)
POTASSIUM SERPL-SCNC: 3.3 MMOL/L (ref 3.5–5.2)
QT INTERVAL: 316 MS
QTC INTERVAL: 494 MS
SODIUM SERPL-SCNC: 137 MMOL/L (ref 136–145)

## 2024-02-19 PROCEDURE — 94799 UNLISTED PULMONARY SVC/PX: CPT

## 2024-02-19 PROCEDURE — 82330 ASSAY OF CALCIUM: CPT

## 2024-02-19 PROCEDURE — 25010000002 FUROSEMIDE PER 20 MG

## 2024-02-19 PROCEDURE — 25010000002 CEFTRIAXONE PER 250 MG: Performed by: INTERNAL MEDICINE

## 2024-02-19 PROCEDURE — 84100 ASSAY OF PHOSPHORUS: CPT

## 2024-02-19 PROCEDURE — 25010000002 MORPHINE PER 10 MG: Performed by: INTERNAL MEDICINE

## 2024-02-19 PROCEDURE — 94664 DEMO&/EVAL PT USE INHALER: CPT

## 2024-02-19 PROCEDURE — 0 DEXTROSE 5 % SOLUTION: Performed by: INTERNAL MEDICINE

## 2024-02-19 PROCEDURE — 94761 N-INVAS EAR/PLS OXIMETRY MLT: CPT

## 2024-02-19 PROCEDURE — 80048 BASIC METABOLIC PNL TOTAL CA: CPT | Performed by: INTERNAL MEDICINE

## 2024-02-19 PROCEDURE — 94660 CPAP INITIATION&MGMT: CPT

## 2024-02-19 PROCEDURE — 94640 AIRWAY INHALATION TREATMENT: CPT

## 2024-02-19 PROCEDURE — 83735 ASSAY OF MAGNESIUM: CPT

## 2024-02-19 PROCEDURE — 25010000002 HEPARIN (PORCINE) PER 1000 UNITS: Performed by: STUDENT IN AN ORGANIZED HEALTH CARE EDUCATION/TRAINING PROGRAM

## 2024-02-19 PROCEDURE — 25010000002 POTASSIUM CHLORIDE 10 MEQ/100ML SOLUTION

## 2024-02-19 PROCEDURE — 99233 SBSQ HOSP IP/OBS HIGH 50: CPT | Performed by: INTERNAL MEDICINE

## 2024-02-19 PROCEDURE — 25010000002 MAGNESIUM SULFATE 2 GM/50ML SOLUTION: Performed by: INTERNAL MEDICINE

## 2024-02-19 PROCEDURE — 25010000002 MEROPENEM PER 100 MG: Performed by: STUDENT IN AN ORGANIZED HEALTH CARE EDUCATION/TRAINING PROGRAM

## 2024-02-19 PROCEDURE — 25010000002 CALCIUM GLUCONATE-NACL 1-0.675 GM/50ML-% SOLUTION

## 2024-02-19 PROCEDURE — 25010000002 MAGNESIUM SULFATE IN D5W 1G/100ML (PREMIX) 1-5 GM/100ML-% SOLUTION

## 2024-02-19 RX ORDER — ONDANSETRON 2 MG/ML
4 INJECTION INTRAMUSCULAR; INTRAVENOUS EVERY 6 HOURS PRN
Status: DISCONTINUED | OUTPATIENT
Start: 2024-02-19 | End: 2024-02-22 | Stop reason: HOSPADM

## 2024-02-19 RX ORDER — POLYETHYLENE GLYCOL 3350 17 G/17G
17 POWDER, FOR SOLUTION ORAL DAILY PRN
Status: DISCONTINUED | OUTPATIENT
Start: 2024-02-19 | End: 2024-02-22 | Stop reason: HOSPADM

## 2024-02-19 RX ORDER — ACETAMINOPHEN 650 MG/1
650 SUPPOSITORY RECTAL EVERY 4 HOURS PRN
Status: DISCONTINUED | OUTPATIENT
Start: 2024-02-19 | End: 2024-02-22 | Stop reason: HOSPADM

## 2024-02-19 RX ORDER — MORPHINE SULFATE 2 MG/ML
1 INJECTION, SOLUTION INTRAMUSCULAR; INTRAVENOUS EVERY 4 HOURS PRN
Status: DISCONTINUED | OUTPATIENT
Start: 2024-02-19 | End: 2024-02-22 | Stop reason: HOSPADM

## 2024-02-19 RX ORDER — LEVOTHYROXINE SODIUM 0.12 MG/1
125 TABLET ORAL
Status: DISCONTINUED | OUTPATIENT
Start: 2024-02-19 | End: 2024-02-22 | Stop reason: HOSPADM

## 2024-02-19 RX ORDER — POTASSIUM CHLORIDE 750 MG/1
10 CAPSULE, EXTENDED RELEASE ORAL DAILY
Status: DISCONTINUED | OUTPATIENT
Start: 2024-02-19 | End: 2024-02-20

## 2024-02-19 RX ORDER — POTASSIUM CHLORIDE 7.45 MG/ML
10 INJECTION INTRAVENOUS ONCE
Status: COMPLETED | OUTPATIENT
Start: 2024-02-19 | End: 2024-02-19

## 2024-02-19 RX ORDER — MAGNESIUM SULFATE HEPTAHYDRATE 40 MG/ML
2 INJECTION, SOLUTION INTRAVENOUS
Status: COMPLETED | OUTPATIENT
Start: 2024-02-19 | End: 2024-02-19

## 2024-02-19 RX ORDER — ACETAMINOPHEN 160 MG/5ML
650 SOLUTION ORAL EVERY 4 HOURS PRN
Status: DISCONTINUED | OUTPATIENT
Start: 2024-02-19 | End: 2024-02-22 | Stop reason: HOSPADM

## 2024-02-19 RX ORDER — METOPROLOL SUCCINATE 25 MG/1
12.5 TABLET, EXTENDED RELEASE ORAL EVERY 12 HOURS SCHEDULED
Status: DISCONTINUED | OUTPATIENT
Start: 2024-02-19 | End: 2024-02-22 | Stop reason: HOSPADM

## 2024-02-19 RX ORDER — AMOXICILLIN 250 MG
2 CAPSULE ORAL 2 TIMES DAILY
Status: DISCONTINUED | OUTPATIENT
Start: 2024-02-19 | End: 2024-02-22 | Stop reason: HOSPADM

## 2024-02-19 RX ORDER — NALOXONE HCL 0.4 MG/ML
0.4 VIAL (ML) INJECTION
Status: DISCONTINUED | OUTPATIENT
Start: 2024-02-19 | End: 2024-02-22 | Stop reason: HOSPADM

## 2024-02-19 RX ORDER — FUROSEMIDE 10 MG/ML
40 INJECTION INTRAMUSCULAR; INTRAVENOUS
Status: DISCONTINUED | OUTPATIENT
Start: 2024-02-19 | End: 2024-02-20

## 2024-02-19 RX ORDER — ONDANSETRON 4 MG/1
8 TABLET, ORALLY DISINTEGRATING ORAL EVERY 8 HOURS PRN
Status: DISCONTINUED | OUTPATIENT
Start: 2024-02-19 | End: 2024-02-22 | Stop reason: HOSPADM

## 2024-02-19 RX ORDER — MAGNESIUM SULFATE 1 G/100ML
2 INJECTION INTRAVENOUS ONCE
Status: DISCONTINUED | OUTPATIENT
Start: 2024-02-19 | End: 2024-02-19

## 2024-02-19 RX ORDER — MAGNESIUM SULFATE 1 G/100ML
1 INJECTION INTRAVENOUS ONCE
Status: COMPLETED | OUTPATIENT
Start: 2024-02-19 | End: 2024-02-19

## 2024-02-19 RX ORDER — FAMOTIDINE 20 MG/1
20 TABLET, FILM COATED ORAL NIGHTLY
Status: DISCONTINUED | OUTPATIENT
Start: 2024-02-19 | End: 2024-02-22 | Stop reason: HOSPADM

## 2024-02-19 RX ORDER — CALCIUM GLUCONATE 20 MG/ML
1000 INJECTION, SOLUTION INTRAVENOUS ONCE
Status: COMPLETED | OUTPATIENT
Start: 2024-02-19 | End: 2024-02-19

## 2024-02-19 RX ORDER — ASPIRIN 81 MG/1
81 TABLET ORAL DAILY
Status: DISCONTINUED | OUTPATIENT
Start: 2024-02-19 | End: 2024-02-22 | Stop reason: HOSPADM

## 2024-02-19 RX ORDER — ACETAMINOPHEN 325 MG/1
650 TABLET ORAL EVERY 4 HOURS PRN
Status: DISCONTINUED | OUTPATIENT
Start: 2024-02-19 | End: 2024-02-22 | Stop reason: HOSPADM

## 2024-02-19 RX ORDER — BISACODYL 5 MG/1
5 TABLET, DELAYED RELEASE ORAL DAILY PRN
Status: DISCONTINUED | OUTPATIENT
Start: 2024-02-19 | End: 2024-02-22 | Stop reason: HOSPADM

## 2024-02-19 RX ORDER — BISACODYL 10 MG
10 SUPPOSITORY, RECTAL RECTAL DAILY PRN
Status: DISCONTINUED | OUTPATIENT
Start: 2024-02-19 | End: 2024-02-22 | Stop reason: HOSPADM

## 2024-02-19 RX ORDER — ALUMINA, MAGNESIA, AND SIMETHICONE 2400; 2400; 240 MG/30ML; MG/30ML; MG/30ML
15 SUSPENSION ORAL EVERY 6 HOURS PRN
Status: DISCONTINUED | OUTPATIENT
Start: 2024-02-19 | End: 2024-02-22 | Stop reason: HOSPADM

## 2024-02-19 RX ADMIN — POTASSIUM CHLORIDE 10 MEQ: 10 CAPSULE, COATED, EXTENDED RELEASE ORAL at 12:14

## 2024-02-19 RX ADMIN — Medication 10 ML: at 21:21

## 2024-02-19 RX ADMIN — HEPARIN SODIUM 5000 UNITS: 5000 INJECTION INTRAVENOUS; SUBCUTANEOUS at 15:14

## 2024-02-19 RX ADMIN — BUDESONIDE 0.5 MG: 0.5 INHALANT ORAL at 18:57

## 2024-02-19 RX ADMIN — METOPROLOL SUCCINATE 12.5 MG: 25 TABLET, EXTENDED RELEASE ORAL at 12:14

## 2024-02-19 RX ADMIN — DICLOFENAC SODIUM 2 G: 10 GEL TOPICAL at 08:02

## 2024-02-19 RX ADMIN — CALCIUM GLUCONATE 1000 MG: 20 INJECTION, SOLUTION INTRAVENOUS at 08:47

## 2024-02-19 RX ADMIN — MEROPENEM 500 MG: 500 INJECTION, POWDER, FOR SOLUTION INTRAVENOUS at 08:53

## 2024-02-19 RX ADMIN — SODIUM PHOSPHATE, MONOBASIC, MONOHYDRATE AND SODIUM PHOSPHATE, DIBASIC, ANHYDROUS 15 MMOL: 142; 276 INJECTION, SOLUTION INTRAVENOUS at 12:14

## 2024-02-19 RX ADMIN — Medication 10 ML: at 08:02

## 2024-02-19 RX ADMIN — MORPHINE SULFATE 1 MG: 2 INJECTION, SOLUTION INTRAMUSCULAR; INTRAVENOUS at 07:47

## 2024-02-19 RX ADMIN — BUDESONIDE 0.5 MG: 0.5 INHALANT ORAL at 07:36

## 2024-02-19 RX ADMIN — MAGNESIUM SULFATE HEPTAHYDRATE 2 G: 40 INJECTION, SOLUTION INTRAVENOUS at 18:25

## 2024-02-19 RX ADMIN — POTASSIUM CHLORIDE 10 MEQ: 7.46 INJECTION, SOLUTION INTRAVENOUS at 07:45

## 2024-02-19 RX ADMIN — ARFORMOTEROL TARTRATE 15 MCG: 15 SOLUTION RESPIRATORY (INHALATION) at 18:57

## 2024-02-19 RX ADMIN — MORPHINE SULFATE 1 MG: 2 INJECTION, SOLUTION INTRAMUSCULAR; INTRAVENOUS at 00:43

## 2024-02-19 RX ADMIN — DICLOFENAC SODIUM 2 G: 10 GEL TOPICAL at 18:25

## 2024-02-19 RX ADMIN — MAGNESIUM SULFATE 1 G: 1 INJECTION INTRAVENOUS at 07:54

## 2024-02-19 RX ADMIN — MORPHINE SULFATE 1 MG: 2 INJECTION, SOLUTION INTRAMUSCULAR; INTRAVENOUS at 12:14

## 2024-02-19 RX ADMIN — FAMOTIDINE 20 MG: 20 TABLET ORAL at 21:15

## 2024-02-19 RX ADMIN — DICLOFENAC SODIUM 2 G: 10 GEL TOPICAL at 12:14

## 2024-02-19 RX ADMIN — HEPARIN SODIUM 5000 UNITS: 5000 INJECTION INTRAVENOUS; SUBCUTANEOUS at 21:15

## 2024-02-19 RX ADMIN — CEFTRIAXONE SODIUM 1000 MG: 1 INJECTION, POWDER, FOR SOLUTION INTRAMUSCULAR; INTRAVENOUS at 12:14

## 2024-02-19 RX ADMIN — FUROSEMIDE 40 MG: 10 INJECTION, SOLUTION INTRAMUSCULAR; INTRAVENOUS at 09:36

## 2024-02-19 RX ADMIN — ARFORMOTEROL TARTRATE 15 MCG: 15 SOLUTION RESPIRATORY (INHALATION) at 07:36

## 2024-02-19 RX ADMIN — LEVOTHYROXINE SODIUM 125 MCG: 125 TABLET ORAL at 12:45

## 2024-02-19 RX ADMIN — MAGNESIUM SULFATE HEPTAHYDRATE 2 G: 40 INJECTION, SOLUTION INTRAVENOUS at 16:47

## 2024-02-19 RX ADMIN — DICLOFENAC SODIUM 2 G: 10 GEL TOPICAL at 21:16

## 2024-02-19 RX ADMIN — FUROSEMIDE 40 MG: 10 INJECTION, SOLUTION INTRAMUSCULAR; INTRAVENOUS at 18:25

## 2024-02-19 RX ADMIN — HEPARIN SODIUM 5000 UNITS: 5000 INJECTION INTRAVENOUS; SUBCUTANEOUS at 05:19

## 2024-02-19 RX ADMIN — ASPIRIN 300 MG: 300 SUPPOSITORY RECTAL at 10:09

## 2024-02-19 RX ADMIN — DOCUSATE SODIUM 50MG AND SENNOSIDES 8.6MG 2 TABLET: 8.6; 5 TABLET, FILM COATED ORAL at 12:14

## 2024-02-19 NOTE — PROGRESS NOTES
[facility]   Hospitalist Progress Note  Date: 2024  Patient Name: Marylou Levin  : 1940  MRN: [id number]  Date of admission: 2024  Room/Bed: [facility]      Subjective   Subjective     Chief Complaint: Decreased responsiveness    Summary:Marylou Levin is a 83 y.o. female  with a past medical history of dementia, hypothyroidism, SLE, dementia, presented to the ED for evaluation of decreased responsiveness.  As per the reports present to the ED physician, patient has a steady decline over the last several weeks.  Currently staying at a nursing facility.  Patient has been nonambulatory in the past 6 months.  Usually patient noted to have some intermittent confusion but able to have conversation.  For the last 2 days patient has significant decreased in response.  Only responding to the physical symptoms.  EMS was called.  Patient noted to be hypotensive and hypoxic, started on supplemental oxygen.  Patient is unable to provide any history at this time.  Intermittently responsive.  Incoherent speech.     In the ED, vital signs temperature 91.9, pulse 79, respiratory 26, blood pressure 90s over 60s, on nonrebreather 15 L saturating around 90%.  Labs initial troponin 141, repeat troponin 146, proBNP 1 1998, VBG showed 7.25/88, sodium 136, potassium 5.8, chloride 92, bicarb 41.7, BUN 27, creatinine 0.83, rest of the CMP with no significant findings, normal ammonia, normal lactic acid, procalcitonin 0.04, urinalysis showed 21-50 WBC, 4+ bacteria, blood cultures and urine cultures in process.  COVID flu RSV negative.  Chest x-ray showed slight increase in the effusion and consolidation in the right lung bases compared to the chest x-ray on 2023.  CT head without contrast showed no acute findings.  Opacification of the mastoid air cells and middle ear cavities may reflect otomastoiditis.  Received ceftriaxone in the ED.  Patient has been admitted for further evaluation and management of acute  metabolic encephalopathy, acute hypoxic and hypercapnic respiratory failure, UTI, hyperkalemia.    Interval Followup: RRT called last night as patient became tachypneic, tachycardic at 135, hypoxemic at 85%.  Started on BiPAP 20/8..  IV Lasix given with good response.  Telemetry shows occasional .   Awake and alert following vocal commands  Patient during my rounds on nasal cannula appears comfortable and denies any complaints.  Patient with no complaints at the time of evaluation.  No urinary problems   Good urine output -2 L    Review of Systems    All systems reviewed and negative except for what is outlined above.  Summary and interval follow-up      Objective   Objective     Vitals:   Temp:  [97.3 °F (36.3 °C)-99.5 °F (37.5 °C)] 98.6 °F (37 °C)  Heart Rate:  [] 104  Resp:  [20-31] 22  BP: ()/(59-82) 91/59  Flow (L/min):  [10-11] 11    Physical Exam   General: Awake, alert, NAD  HENT: NCAT, MMM  Eyes: pupils equal, no scleral icterus  Cardiovascular: RRR, no patient will murmurs   Pulmonary: Improved air movement bilaterally; no wheezes  Gastrointestinal: S/ND/NT, +BS  Musculoskeletal: No gross deformities  Skin: No jaundice, no rash on exposed skin appreciated  Neuro: CN II through XII grossly intact;  no tremor  Psych: Mood and affect appropriate  : No Ontiveros catheter; no suprapubic tenderness    Result Review    Result Review:  I have personally reviewed these results:  [x]  Laboratory      Lab 02/18/24  0735 02/18/24  0406 02/17/24  0931 02/17/24  0635 02/17/24  0135 02/16/24  1923 02/16/24  1812   WBC  --  7.33  --  9.14  --   --  7.41   HEMOGLOBIN  --  11.6*  --  12.8  --   --  12.9   HEMATOCRIT  --  39.7  --  44.2  --   --  45.6   PLATELETS  --  159  --  164  --   --  187   NEUTROS ABS  --  4.71  --  6.19  --   --  5.15   IMMATURE GRANS (ABS)  --  0.03  --  0.05  --   --  0.03   LYMPHS ABS  --  1.57  --  1.68  --   --  1.29   MONOS ABS  --  0.88  --  1.12*  --   --  0.89   EOS ABS  --   0.11  --  0.07  --   --  0.03   MCV  --  101.3*  --  104.7*  --   --  104.8*   PROCALCITONIN  --   --   --   --   --   --  0.04   LACTATE  --   --   --   --   --   --  1.5   LACTATE, ARTERIAL 1.33  --  1.46  --  1.71   < >  --    PROTIME  --   --   --   --   --   --  17.9*    < > = values in this interval not displayed.         Lab 02/19/24  0654 02/19/24  0609 02/18/24  0735 02/18/24  0406 02/17/24  0931 02/17/24  0635 02/17/24  0135 02/16/24  1924   SODIUM  --  137  --  136  --  137  --   --    SODIUM, ARTERIAL  --   --  136.7  --  134.5*  --    < >  --    POTASSIUM  --  3.3*  --  4.0  --  4.9  --   --    CHLORIDE  --  85*  --  91*  --  93*  --   --    CO2  --  43.2*  --  41.6*  --  38.1*  --   --    ANION GAP  --  8.8  --  3.4*  --  5.9  --   --    BUN  --  21  --  25*  --  28*  --   --    CREATININE  --  0.67  --  0.77  --  0.85  --   --    EGFR  --  85.8  --  75.7  --  67.2  --   --    GLUCOSE  --  132*  --  75  --  93  --   --    GLUCOSE, ARTERIAL  --   --  78  --  144*  --    < >  --    CALCIUM  --  8.5*  --  8.8  --  9.4  --   --    IONIZED CALCIUM 0.99*  --  1.13  --  1.20  --    < >  --    MAGNESIUM  --  0.7*  --   --   --  1.6  --   --    PHOSPHORUS  --  1.3*  --   --   --  2.9  --   --    TSH  --   --   --   --   --   --   --  1.960    < > = values in this interval not displayed.         Lab 02/17/24  0635 02/16/24  1812   TOTAL PROTEIN 5.4* 5.7*   ALBUMIN 2.8* 2.9*   GLOBULIN 2.6 2.8   ALT (SGPT) 12 13   AST (SGOT) 20 21   BILIRUBIN 0.4 0.3   ALK PHOS 58 69         Lab 02/18/24  1601 02/18/24  0634 02/18/24  0406 02/16/24  1924 02/16/24  1812   PROBNP  --   --   --   --  19,598.0*   HSTROP T 142* 152* 155*   < > 147*   PROTIME  --   --   --   --  17.9*   INR  --   --   --   --  1.45*    < > = values in this interval not displayed.                 Lab 02/18/24  0735 02/17/24  1444 02/17/24  0931   PH, ARTERIAL 7.384 7.385 7.334*   PCO2, ARTERIAL 75.2* 66.3* 83.5*   PO2 ART 80.7 140.3* 149.7*   O2  SATURATION ART 95.5 98.7 98.8   FIO2 75 70 80   HCO3 ART 43.9* 38.8* 43.4*   BASE EXCESS ART 15.4* 11.1* 13.6*   CARBOXYHEMOGLOBIN 0.3 0.4 0.4     Brief Urine Lab Results  (Last result in the past 365 days)        Color   Clarity   Blood   Leuk Est   Nitrite   Protein   CREAT   Urine HCG        02/1940 Dark Yellow   Turbid   Trace   Large (3+)   Negative   30 mg/dL (1+)                 [x]  Microbiology   Microbiology Results (last 10 days)       Procedure Component Value - Date/Time    Respiratory Panel PCR w/COVID-19(SARS-CoV-2) ANGELICA/PHI/PATTI/PAD/COR/TK In-House, NP Swab in UTM/VTM, 2 HR TAT - Swab, Nasopharynx [[id number]]  (Normal) Collected: 02/17/24 1211    Lab Status: Final result Specimen: Swab from Nasopharynx Updated: 02/17/24 1304     ADENOVIRUS, PCR Not Detected     Coronavirus 229E Not Detected     Coronavirus HKU1 Not Detected     Coronavirus NL63 Not Detected     Coronavirus OC43 Not Detected     COVID19 Not Detected     Human Metapneumovirus Not Detected     Human Rhinovirus/Enterovirus Not Detected     Influenza A PCR Not Detected     Influenza B PCR Not Detected     Parainfluenza Virus 1 Not Detected     Parainfluenza Virus 2 Not Detected     Parainfluenza Virus 3 Not Detected     Parainfluenza Virus 4 Not Detected     RSV, PCR Not Detected     Bordetella pertussis pcr Not Detected     Bordetella parapertussis PCR Not Detected     Chlamydophila pneumoniae PCR Not Detected     Mycoplasma pneumo by PCR Not Detected    Narrative:      In the setting of a positive respiratory panel with a viral infection PLUS a negative procalcitonin without other underlying concern for bacterial infection, consider observing off antibiotics or discontinuation of antibiotics and continue supportive care. If the respiratory panel is positive for atypical bacterial infection (Bordetella pertussis, Chlamydophila pneumoniae, or Mycoplasma pneumoniae), consider antibiotic de-escalation to target atypical bacterial  infection.    S. Pneumo Ag Urine or CSF - Urine, Urine, Clean Catch [[id number]]  (Normal) Collected: 02/17/24 1210    Lab Status: Final result Specimen: Urine, Clean Catch Updated: 02/17/24 1228     Strep Pneumo Ag Negative    Legionella Antigen, Urine - Urine, Urine, Clean Catch [[id number]]  (Normal) Collected: 02/17/24 1210    Lab Status: Final result Specimen: Urine, Clean Catch Updated: 02/17/24 1229     LEGIONELLA ANTIGEN, URINE Negative    COVID-19, FLU A/B, RSV PCR 1 HR TAT - Swab, Nasopharynx [[id number]]  (Normal) Collected: 02/16/24 1950    Lab Status: Final result Specimen: Swab from Nasopharynx Updated: 02/16/24 2040     COVID19 Not Detected     Influenza A PCR Not Detected     Influenza B PCR Not Detected     RSV, PCR Not Detected    Narrative:      Fact sheet for providers: https://www.fda.gov/media/827154/download    Fact sheet for patients: https://www.fda.gov/media/436950/download    Test performed by PCR.    Blood Culture - Blood, Arm, Right [[id number]]  (Normal) Collected: 02/16/24 1946    Lab Status: Preliminary result Specimen: Blood from Arm, Right Updated: 02/18/24 2000     Blood Culture No growth at 2 days    Blood Culture - Blood, Arm, Right [[id number]]  (Abnormal) Collected: 02/16/24 1946    Lab Status: Preliminary result Specimen: Blood from Arm, Right Updated: 02/19/24 0643     Blood Culture Staphylococcus, coagulase negative     Isolated from Aerobic and Anaerobic Bottles     Gram Stain Aerobic Bottle Gram positive cocci in clusters      Anaerobic Bottle Gram positive cocci in clusters    Narrative:      Probable contaminant requires clinical correlation, susceptibility not performed unless requested by physician.      Blood Culture ID, PCR - Blood, Arm, Right [[id number]]  (Abnormal) Collected: 02/16/24 1946    Lab Status: Final result Specimen: Blood from Arm, Right Updated: 02/17/24 1922     BCID, PCR Staph spp, not aureus or lugdunensis. Identification by BCID2 PCR.     BOTTLE TYPE  Aerobic Bottle    Urine Culture - Urine, Urine, Clean Catch [[id number]]  (Abnormal)  (Susceptibility) Collected: 02/1940    Lab Status: Final result Specimen: Urine, Clean Catch Updated: 02/18/24 1105     Urine Culture >100,000 CFU/mL Escherichia coli    Narrative:      Colonization of the urinary tract without infection is common. Treatment is discouraged unless the patient is symptomatic, pregnant, or undergoing an invasive urologic procedure.    Susceptibility        Escherichia coli      DAVIN      Amoxicillin + Clavulanate Intermediate      Ampicillin Resistant      Ampicillin + Sulbactam Resistant      Cefazolin Susceptible      Cefepime Susceptible      Ceftazidime Susceptible      Ceftriaxone Susceptible      Gentamicin Susceptible      Levofloxacin Resistant      Nitrofurantoin Susceptible      Piperacillin + Tazobactam Susceptible      Trimethoprim + Sulfamethoxazole Resistant                                 [x]  Radiology  XR Chest 1 View    Result Date: 2/18/2024   No significant interval change.       SIMON MCDONNELL MD       Electronically Signed and Approved By: SIMON MCDONNELL MD on 2/18/2024 at 23:26             XR Chest 1 View    Result Date: 2/17/2024    1. Mild-to-moderate bilateral perihilar and basilar airspace opacity suspected to represent pulmonary edema.  Superimposed pneumonia is not excluded 2. Suspected small bilateral pleural effusions       Tavo Law M.D.       Electronically Signed and Approved By: Tavo Law M.D. on 2/17/2024 at 8:14             CT Head Without Contrast    Result Date: 2/16/2024   1. No acute findings in the brain. 2. Opacification of the mastoid air cells and middle ear cavities may reflect otomastoiditis.      SIMON MCDONNELL MD       Electronically Signed and Approved By: SIMON MCDONNELL MD on 2/16/2024 at 22:17            []  EKG/Telemetry   [x]  Cardiology/Vascular     2D echo    Left ventricular ejection fraction appears to be 56 - 60%.    Left  ventricular wall thickness is consistent with septal asymmetric hypertrophy.    Left ventricular diastolic function is consistent with (grade I) impaired relaxation and age.    The right ventricular cavity is mildly dilated.    Mild to moderate tricuspid regurgitation.    []  Pathology  []  Old records  []  Other:    Assessment & Plan   Assessment / Plan     Assessment:  Acute metabolic encephalopathy DDX: UTI, worsening dementia, hypercapnia  Acute hypoxic and hypercapnic respiratory failure  E. coli UTI  Coagulase-negative staph in blood culture.  Likely contamination  Hyperkalemia.  Resolved  Elevated troponin likely NSTEMI type II from hypoxia, CHF exacerbation  Acute on chronic diastolic congestive heart failure  Hypothyroidism  History of SLE  Mild to moderate mitral valve regurgitation    Plan:  Continue to monitor on telemetry  Resumed home metoprolol..  IV Lopressor.  Resume home Synthroid and KCl.  Follow-up on blood cultures.  Coagulase-negative staph.  Most likely contamination.  DC meropenem.  E. coli MDR but sensitive to Rocephin.  Continue BiPAP at bedtime  Pulmonology consulted for further assistance in management.  Consult is greatly appreciated.    TSH within normal limits  2D echo results noted.  Continue Lasix as needed.  Monitor strict I's and O's and daily weights  All eval performed this morning patient initiated on diet.  POCT glucose every 2 hours.  On D5 water drip because of hypoglycemia  Palliative care consult when services available  Appreciate speech therapy input started on puréed solids and thin liquids.  Discussed with RN and .  Return to nursing home in next couple of days    DVT prophylaxis:  Medical DVT prophylaxis orders are present.        CODE STATUS:   Medical Intervention Limits: NO intubation (DNI)  Level Of Support Discussed With: Health Care Surrogate  Code Status (Patient has no pulse and is not breathing): No CPR (Do Not Attempt to Resuscitate)  Medical  Interventions (Patient has pulse or is breathing): Limited Support      Electronically signed by Jayme Sahu MD, 2/19/2024, 18:20 EST.

## 2024-02-19 NOTE — PROGRESS NOTES
RT EQUIPMENT DEVICE RELATED - SKIN ASSESSMENT    RT Medical Equipment/Device:     NIV Mask:  Under-the-nose   size:  b    Skin Assessment:      Cheek:  Intact  Chin:  Intact  Nose:  Intact    Device Skin Pressure Protection:  Positioning supports utilized    Nurse Notification:  Marce Lemus, RRT

## 2024-02-19 NOTE — PROGRESS NOTES
Pulmonary / Critical Care Progress Note      Patient Name: Marylou Levin  : 1940  MRN: [id number]  Primary Care Physician:  Brady Rae MD  Date of admission: 2024    Subjective   Subjective   Follow-up for hypoxic and hypercapnic respiratory failure requiring NIPPV    Alternate between BiPAP and high liter oxygen at 11 L  Still intermittent confusion  Dyspnea better.  Desaturates little activity  Lying in bed  No fevers or chills  Denies chest pain or chest tightness  Denies cough  Remains weak and fatigued      Objective   Objective     Vitals:   Temp:  [97.3 °F (36.3 °C)-99.5 °F (37.5 °C)] 98.4 °F (36.9 °C)  Heart Rate:  [] 118  Resp:  [20-31] 24  BP: (109-127)/(59-82) 123/64  Flow (L/min):  [10-11] 11  Physical Exam   Vital Signs Reviewed   General:  WDWN, Alert, NAD.  Chronically ill-appearing elderly woman, lying in bed  HEENT:  PERRL, EOMI.  OP, nares clear, no sinus tenderness  Neck:  Supple, no JVD, no thyromegaly  Chest: Decreased aeration with diminished/coarse crackles on auscultation bilaterally, some work of breathing noted   CV: Sinus tachycardia, no MGR, pulses 2+, equal.  Abd:  Soft, NT, ND, + BS, no HSM, obese  EXT:  no clubbing, no cyanosis, no edema  Neuro:  A&Ox1, CN grossly intact, no focal deficits.  Skin: No rashes or lesions noted    Result Review    Result Review:  I have personally reviewed the results from the time of this admission to 2024 15:37 EST and agree with these findings:  [x]  Laboratory  [x]  Microbiology  [x]  Radiology  []  EKG/Telemetry   []  Cardiology/Vascular   []  Pathology  []  Old records  []  Other:  Most notable findings include:    echo pending.  2023 echo with EF of 61 to 65% grade 1 diastolic dysfunction        Lab 24  0609 24  0735 24  0406 24  0931 24  0635 24  0135 24  1923 24  1812   WBC  --   --  7.33  --  9.14  --   --  7.41   HEMOGLOBIN  --   --  11.6*  --  12.8   --   --  12.9   HEMATOCRIT  --   --  39.7  --  44.2  --   --  45.6   PLATELETS  --   --  159  --  164  --   --  187   SODIUM 137  --  136  --  137  --   --  136   SODIUM, ARTERIAL  --  136.7  --  134.5*  --  133.6*  --   --    SODIUM, VENOUS  --   --   --   --   --   --  133.1*  --    POTASSIUM 3.3*  --  4.0  --  4.9  --   --  5.8*   POTASSIUM, VENOUS  --   --   --   --   --   --  5.5*  --    CHLORIDE 85*  --  91*  --  93*  --   --  92*   CHLORIDE, VENOUS  --   --   --   --   --   --  90*  --    CO2 43.2*  --  41.6*  --  38.1*  --   --  41.7*   BUN 21  --  25*  --  28*  --   --  27*   CREATININE 0.67  --  0.77  --  0.85  --   --  0.83   GLUCOSE 132*  --  75  --  93  --   --  129*   GLUCOSE, ARTERIAL  --  78  --  144*  --  150* 113*  --    CALCIUM 8.5*  --  8.8  --  9.4  --   --  9.1   PHOSPHORUS 1.3*  --   --   --  2.9  --   --   --    TOTAL PROTEIN  --   --   --   --  5.4*  --   --  5.7*   ALBUMIN  --   --   --   --  2.8*  --   --  2.9*   GLOBULIN  --   --   --   --  2.6  --   --  2.8       Assessment & Plan   Assessment / Plan     Active Hospital Problems:  Active Hospital Problems    Diagnosis     **Acute metabolic encephalopathy      Impression:   Acute on chronic hypoxic and hypercapnic respiratory failure requiring NIPPV, 2 L is baseline  NSTEMI likely type II  CKD stage III  History of mycoplasma pneumonia  History of SLE  History of A-fib/flutter, not on anticoagulation     Plan:   -Currently on 10 L high flow nasal cannula.  Continue to wean supplemental oxygen to maintain SpO2 greater than 90%.  Patient's baseline is 2 L  -Encourage NIPPV wear at nighttime and as needed with naps  -Patient still somewhat confused this morning.  Patient knows that she is in [place].  Will recheck ABG  -ABG reviewed, 7.38/75/80<--7.38/66/140  -Continue Lasix 40 mg IV twice daily  -Continue to monitor renal panel and electrolytes.  Replace potassium and magnesium aggressively  -Micro negative to date  -Continue  nebulizers and bronchopulmonary hygiene  -Continue meropenem for UTI  -SLP on board, appreciate input  -Palliative care on board, appreciate input  -Recommend avoiding any sedating medications  Patient is DNR/DNI     DVT prophylaxis:  Medical DVT prophylaxis orders are present.    CODE STATUS:   Medical Intervention Limits: NO intubation (DNI)  Level Of Support Discussed With: Health Care Surrogate  Code Status (Patient has no pulse and is not breathing): No CPR (Do Not Attempt to Resuscitate)  Medical Interventions (Patient has pulse or is breathing): Limited Support      Labs, imaging, microbiology, notes and medications personally reviewed  Discussed with primary    Electronically signed by Brian Mantilla MD, 2/19/2024, 15:37 EST.

## 2024-02-19 NOTE — SIGNIFICANT NOTE
Wound Eval / Progress Noted    SCOTTIE Palm     Patient Name: Marylou Leivn  : 1940  MRN: [id number]  Today's Date: 2024                 Admit Date: 2024    Visit Dx:    ICD-10-CM ICD-9-CM   1. Acute respiratory failure with hypoxia  J96.01 518.81   2. Acute pulmonary edema  J81.0 518.4   3. Urinary tract infection without hematuria, site unspecified  N39.0 599.0   4. Hyperkalemia  E87.5 276.7   5. Elevated troponin  R79.89 790.6   6. Oropharyngeal dysphagia  R13.12 787.22         Acute metabolic encephalopathy        Past Medical History:   Diagnosis Date    Atrial fibrillation     Dementia     pt son    Difficulty walking     Hypokalemia     Hypothyroidism     Idiopathic progressive neuropathy     Muscle weakness     Systemic lupus         Past Surgical History:   Procedure Laterality Date    SPINAL FUSION      provided by pt son         Physical Assessment:  Wound Right posterior ankle Venous Ulcer (Active)   Dressing Appearance intact;dry 24 1600   Closure None 24 1600   Base blanchable;moist;red 24 1600   Red (%), Wound Tissue Color 100 24 1600   Periwound intact;dry;redness 24 1600   Periwound Temperature warm 24 1600   Periwound Skin Turgor soft 24 1600   Edges open 24 1600   Drainage Characteristics/Odor serosanguineous 24 1600   Drainage Amount scant 24 1600   Care, Wound cleansed with;sterile normal saline 24 1600   Dressing Care dressing applied;silver impregnated;hydrofiber;silicone;border dressing 24 1600   Periwound Care absorptive dressing applied 24 1600      Wound Check / Follow-up:  Patient seen today for a wound consult.     Patient was found to have blanchable red moist open tissue the right lateral foot. Patient unable to provide etiology of the wound. Could likely be pressure related provided the way the patient lays her foot. Cleansed with NS. Recommending daily dressing changes with silver  impregnated hydrofiber to the wound base and secure with a silicone border dressing. Offload heels at all times.    Buttocks with blanchable redness with a linear DTPI to the left gluteal aspect. DTPI is non-blanchable and intact. Cleansed with NS. Recommend daily dressing changes with non-adherent petroleum gauze and secure with a silicone border dressing. Will recommend to implement Q2H turns and offload at all times for a Brenton score of 12. Bilateral heels are reddened intact and boggy; tissue is intact. Keep the patient clean and free from moisture. Apply blue top moisture barrier TID / PRN and leave open to air      Impression: blanchable red moist tissue to the right lateral foot    Short term goals:  regain skin integrity, skin protection, topical treatment, daily dressing changes, pressure reduction, moisture prevention    Saima Day RN    2/19/2024    18:33 EST

## 2024-02-19 NOTE — PLAN OF CARE
Goal Outcome Evaluation:   Pt has been on bipap all night with sats >90%, pt is miserable on bipap and begging for it to be off.

## 2024-02-19 NOTE — PLAN OF CARE
Goal Outcome Evaluation:Patient was placed on BIPAP approx 2300 during RRT for tachycardia and hypoxia. She remains on BIPAP at this time @ 20/8, RR 16, 100%. Unit was on 80% before RRT, and patient was on 10L HF before placed on BIPAP.  Saturations are hard to obtain on patient.She is upset being on BIPAP, but seems to understand when RT explains to her why.

## 2024-02-19 NOTE — PROGRESS NOTES
RT EQUIPMENT DEVICE RELATED - SKIN ASSESSMENT    RT Medical Equipment/Device:     NIV Mask:  Under-the-nose   size:  B    Skin Assessment:      Cheek:  Intact  Nose:  Intact    Device Skin Pressure Protection:  Skin-to-device areas padded:  None Required    Nurse Notification:  Marce Maxwell, RRT

## 2024-02-19 NOTE — PLAN OF CARE
Goal Outcome Evaluation:  RRT was called on 2/18 at 2300 for tachycardia and hypoxia.  Placed on Bipap.  40mg lasix IV once with 1.4L of output.  Patient remains on Bipap at this time.

## 2024-02-19 NOTE — CONSULTS
Purpose of the visit was to evaluate for: goals of care/advanced care planning. Spoke with family and discussed palliative care.      Assessment:The patient is on 5STU, IV ABX running, IV fluids, bipap, chronic pain reported by son at bedside. The patients is in bed with HOB elevated, on continuous pulse ox and the patient is restless with not liking the bipap on and having a little trouble keeping her arm straight for the fluids to run.    Recommendations/Plan:  The son would like to watch the patient over the next few days to see if she is able to wean off the bipap and understands that the patient can not live on it continuous, nor would want to .    Tasks Completed: Emotional Support.    Other Comments: Palliative care nurse visited with the patient and son at the bedside. The patients spouse has passed therefor the son Alessandro would be the HCS as the second son is estranged. An EMS DNR is on file from December. The patient has been at [facility] for the past 5-6 years and the goal would be to return if able. I provided a pamphlet on [facility] and briefly discussed the compassionate withdrawal process as the son expressed the patient is not happy about having the bipap on and it is pretty hard for him to watch. The son expressed he is not ready for a withdrawal at this time and is hopeful she will clear mentally and improve.    -Fouzia YOUNG, RN, [facility]   Palliative Care    2/19/2024 13:59 EST

## 2024-02-20 LAB
ALBUMIN SERPL-MCNC: 2.4 G/DL (ref 3.5–5.2)
ANION GAP SERPL CALCULATED.3IONS-SCNC: 2.7 MMOL/L (ref 5–15)
BACTERIA SPEC AEROBE CULT: ABNORMAL
BASOPHILS # BLD AUTO: 0.02 10*3/MM3 (ref 0–0.2)
BASOPHILS NFR BLD AUTO: 0.2 % (ref 0–1.5)
BUN SERPL-MCNC: 18 MG/DL (ref 8–23)
BUN/CREAT SERPL: 30 (ref 7–25)
CALCIUM SPEC-SCNC: 8.3 MG/DL (ref 8.6–10.5)
CHLORIDE SERPL-SCNC: 83 MMOL/L (ref 98–107)
CO2 SERPL-SCNC: 47.3 MMOL/L (ref 22–29)
CREAT SERPL-MCNC: 0.6 MG/DL (ref 0.57–1)
DEPRECATED RDW RBC AUTO: 49.5 FL (ref 37–54)
EGFRCR SERPLBLD CKD-EPI 2021: 88.1 ML/MIN/1.73
EOSINOPHIL # BLD AUTO: 0.2 10*3/MM3 (ref 0–0.4)
EOSINOPHIL NFR BLD AUTO: 2.5 % (ref 0.3–6.2)
ERYTHROCYTE [DISTWIDTH] IN BLOOD BY AUTOMATED COUNT: 14.3 % (ref 12.3–15.4)
GLUCOSE BLDC GLUCOMTR-MCNC: 124 MG/DL (ref 70–99)
GLUCOSE BLDC GLUCOMTR-MCNC: 133 MG/DL (ref 70–99)
GLUCOSE BLDC GLUCOMTR-MCNC: 89 MG/DL (ref 70–99)
GLUCOSE SERPL-MCNC: 80 MG/DL (ref 65–99)
GRAM STN SPEC: ABNORMAL
GRAM STN SPEC: ABNORMAL
HCT VFR BLD AUTO: 35.9 % (ref 34–46.6)
HGB BLD-MCNC: 11.2 G/DL (ref 12–15.9)
IMM GRANULOCYTES # BLD AUTO: 0.06 10*3/MM3 (ref 0–0.05)
IMM GRANULOCYTES NFR BLD AUTO: 0.7 % (ref 0–0.5)
ISOLATED FROM: ABNORMAL
LYMPHOCYTES # BLD AUTO: 1.28 10*3/MM3 (ref 0.7–3.1)
LYMPHOCYTES NFR BLD AUTO: 15.7 % (ref 19.6–45.3)
MAGNESIUM SERPL-MCNC: 1.9 MG/DL (ref 1.6–2.4)
MCH RBC QN AUTO: 29.7 PG (ref 26.6–33)
MCHC RBC AUTO-ENTMCNC: 31.2 G/DL (ref 31.5–35.7)
MCV RBC AUTO: 95.2 FL (ref 79–97)
MONOCYTES # BLD AUTO: 0.75 10*3/MM3 (ref 0.1–0.9)
MONOCYTES NFR BLD AUTO: 9.2 % (ref 5–12)
NEUTROPHILS NFR BLD AUTO: 5.84 10*3/MM3 (ref 1.7–7)
NEUTROPHILS NFR BLD AUTO: 71.7 % (ref 42.7–76)
NRBC BLD AUTO-RTO: 0 /100 WBC (ref 0–0.2)
NT-PROBNP SERPL-MCNC: 6525 PG/ML (ref 0–1800)
PHOSPHATE SERPL-MCNC: 2.8 MG/DL (ref 2.5–4.5)
PLATELET # BLD AUTO: 146 10*3/MM3 (ref 140–450)
PMV BLD AUTO: 11 FL (ref 6–12)
POTASSIUM SERPL-SCNC: 2.7 MMOL/L (ref 3.5–5.2)
RBC # BLD AUTO: 3.77 10*6/MM3 (ref 3.77–5.28)
SODIUM SERPL-SCNC: 133 MMOL/L (ref 136–145)
WBC NRBC COR # BLD AUTO: 8.15 10*3/MM3 (ref 3.4–10.8)

## 2024-02-20 PROCEDURE — 94664 DEMO&/EVAL PT USE INHALER: CPT

## 2024-02-20 PROCEDURE — 25010000002 MAGNESIUM SULFATE 2 GM/50ML SOLUTION: Performed by: INTERNAL MEDICINE

## 2024-02-20 PROCEDURE — 83880 ASSAY OF NATRIURETIC PEPTIDE: CPT | Performed by: INTERNAL MEDICINE

## 2024-02-20 PROCEDURE — 85025 COMPLETE CBC W/AUTO DIFF WBC: CPT | Performed by: INTERNAL MEDICINE

## 2024-02-20 PROCEDURE — 25010000002 FUROSEMIDE PER 20 MG: Performed by: INTERNAL MEDICINE

## 2024-02-20 PROCEDURE — 94799 UNLISTED PULMONARY SVC/PX: CPT

## 2024-02-20 PROCEDURE — 25010000002 ACETAZOLAMIDE PER 500 MG: Performed by: INTERNAL MEDICINE

## 2024-02-20 PROCEDURE — 25010000002 CEFTRIAXONE PER 250 MG: Performed by: INTERNAL MEDICINE

## 2024-02-20 PROCEDURE — 25010000002 HEPARIN (PORCINE) PER 1000 UNITS: Performed by: STUDENT IN AN ORGANIZED HEALTH CARE EDUCATION/TRAINING PROGRAM

## 2024-02-20 PROCEDURE — 80069 RENAL FUNCTION PANEL: CPT | Performed by: INTERNAL MEDICINE

## 2024-02-20 PROCEDURE — 83735 ASSAY OF MAGNESIUM: CPT

## 2024-02-20 PROCEDURE — 99232 SBSQ HOSP IP/OBS MODERATE 35: CPT | Performed by: INTERNAL MEDICINE

## 2024-02-20 PROCEDURE — 82948 REAGENT STRIP/BLOOD GLUCOSE: CPT

## 2024-02-20 PROCEDURE — 99233 SBSQ HOSP IP/OBS HIGH 50: CPT | Performed by: INTERNAL MEDICINE

## 2024-02-20 RX ORDER — POTASSIUM CHLORIDE 750 MG/1
40 CAPSULE, EXTENDED RELEASE ORAL DAILY
Status: DISCONTINUED | OUTPATIENT
Start: 2024-02-20 | End: 2024-02-22 | Stop reason: HOSPADM

## 2024-02-20 RX ORDER — ACETAZOLAMIDE 500 MG/5ML
500 INJECTION, POWDER, LYOPHILIZED, FOR SOLUTION INTRAVENOUS ONCE
Status: COMPLETED | OUTPATIENT
Start: 2024-02-20 | End: 2024-02-20

## 2024-02-20 RX ORDER — MAGNESIUM SULFATE HEPTAHYDRATE 40 MG/ML
2 INJECTION, SOLUTION INTRAVENOUS
Status: COMPLETED | OUTPATIENT
Start: 2024-02-20 | End: 2024-02-20

## 2024-02-20 RX ORDER — HYDROCODONE BITARTRATE AND ACETAMINOPHEN 7.5; 325 MG/1; MG/1
1 TABLET ORAL EVERY 4 HOURS PRN
COMMUNITY
Start: 2024-02-14

## 2024-02-20 RX ORDER — FUROSEMIDE 10 MG/ML
40 INJECTION INTRAMUSCULAR; INTRAVENOUS DAILY
Status: DISCONTINUED | OUTPATIENT
Start: 2024-02-20 | End: 2024-02-22 | Stop reason: HOSPADM

## 2024-02-20 RX ORDER — POTASSIUM CHLORIDE 750 MG/1
40 CAPSULE, EXTENDED RELEASE ORAL
Status: DISPENSED | OUTPATIENT
Start: 2024-02-20 | End: 2024-02-21

## 2024-02-20 RX ADMIN — ASPIRIN 81 MG: 81 TABLET, COATED ORAL at 08:23

## 2024-02-20 RX ADMIN — DICLOFENAC SODIUM 2 G: 10 GEL TOPICAL at 19:05

## 2024-02-20 RX ADMIN — LEVOTHYROXINE SODIUM 125 MCG: 125 TABLET ORAL at 05:05

## 2024-02-20 RX ADMIN — DICLOFENAC SODIUM 2 G: 10 GEL TOPICAL at 20:47

## 2024-02-20 RX ADMIN — ARFORMOTEROL TARTRATE 15 MCG: 15 SOLUTION RESPIRATORY (INHALATION) at 18:57

## 2024-02-20 RX ADMIN — ACETAZOLAMIDE 500 MG: 500 INJECTION, POWDER, LYOPHILIZED, FOR SOLUTION INTRAVENOUS at 10:15

## 2024-02-20 RX ADMIN — POTASSIUM CHLORIDE 40 MEQ: 10 CAPSULE, COATED, EXTENDED RELEASE ORAL at 13:50

## 2024-02-20 RX ADMIN — BUDESONIDE 0.5 MG: 0.5 INHALANT ORAL at 18:57

## 2024-02-20 RX ADMIN — ARFORMOTEROL TARTRATE 15 MCG: 15 SOLUTION RESPIRATORY (INHALATION) at 07:14

## 2024-02-20 RX ADMIN — DICLOFENAC SODIUM 2 G: 10 GEL TOPICAL at 08:24

## 2024-02-20 RX ADMIN — HEPARIN SODIUM 5000 UNITS: 5000 INJECTION INTRAVENOUS; SUBCUTANEOUS at 05:05

## 2024-02-20 RX ADMIN — METOPROLOL SUCCINATE 12.5 MG: 25 TABLET, EXTENDED RELEASE ORAL at 08:24

## 2024-02-20 RX ADMIN — METOPROLOL SUCCINATE 12.5 MG: 25 TABLET, EXTENDED RELEASE ORAL at 20:47

## 2024-02-20 RX ADMIN — Medication 10 ML: at 08:23

## 2024-02-20 RX ADMIN — HEPARIN SODIUM 5000 UNITS: 5000 INJECTION INTRAVENOUS; SUBCUTANEOUS at 13:50

## 2024-02-20 RX ADMIN — Medication 10 ML: at 20:48

## 2024-02-20 RX ADMIN — MAGNESIUM SULFATE HEPTAHYDRATE 2 G: 2 INJECTION, SOLUTION INTRAVENOUS at 13:10

## 2024-02-20 RX ADMIN — BUDESONIDE 0.5 MG: 0.5 INHALANT ORAL at 07:14

## 2024-02-20 RX ADMIN — DICLOFENAC SODIUM 2 G: 10 GEL TOPICAL at 13:11

## 2024-02-20 RX ADMIN — HEPARIN SODIUM 5000 UNITS: 5000 INJECTION INTRAVENOUS; SUBCUTANEOUS at 20:57

## 2024-02-20 RX ADMIN — CEFTRIAXONE SODIUM 1000 MG: 1 INJECTION, POWDER, FOR SOLUTION INTRAMUSCULAR; INTRAVENOUS at 13:10

## 2024-02-20 RX ADMIN — MAGNESIUM SULFATE HEPTAHYDRATE 2 G: 2 INJECTION, SOLUTION INTRAVENOUS at 10:12

## 2024-02-20 RX ADMIN — FUROSEMIDE 40 MG: 10 INJECTION, SOLUTION INTRAMUSCULAR; INTRAVENOUS at 08:22

## 2024-02-20 RX ADMIN — FAMOTIDINE 20 MG: 20 TABLET ORAL at 20:47

## 2024-02-20 NOTE — PROGRESS NOTES
Pulmonary / Critical Care Progress Note      Patient Name: Marylou Levin  : 1940  MRN: [id number]  Primary Care Physician:  Brady Rae MD  Date of admission: 2024    Subjective   Subjective   Follow-up for hypoxic and hypercapnic respiratory failure requiring NIPPV    Over the last 24 hours, remains on Brovana, Pulmicort, DuoNebs.  Continues ceftriaxone course.    No acute events overnight     This morning,  Patient still alternating between BiPAP and 8 L flow  Patient remains pleasantly confused  Dyspnea continues to improve, exertional dyspnea remains  Lying in bed  No fever or chills  Denies chest pain or chest tightness  Denies cough  Remains weak and fatigued  Diuresing well  -1.6 L urine output    Objective   Objective     Vitals:   Temp:  [97.3 °F (36.3 °C)-98.6 °F (37 °C)] 97.7 °F (36.5 °C)  Heart Rate:  [] 80  Resp:  [16-22] 16  BP: ()/(50-84) 94/58  Flow (L/min):  [7-11] 7  Physical Exam   Vital Signs Reviewed   General:  WDWN, Alert, NAD.  Chronically ill-appearing elderly woman, lying in bed  HEENT:  PERRL, EOMI.  OP, nares clear, no sinus tenderness  Neck:  Supple, no JVD, no thyromegaly  Chest: Decreased aeration with diminished/coarse crackles on auscultation bilaterally, some work of breathing noted   CV: Sinus tachycardia, no MGR, pulses 2+, equal.  Abd:  Soft, NT, ND, + BS, no HSM, obese  EXT:  no clubbing, no cyanosis, no edema  Neuro:  A&Ox1, CN grossly intact, no focal deficits.  Skin: No rashes or lesions noted    Result Review    Result Review:  I have personally reviewed the results from the time of this admission to 2024 15:15 EST and agree with these findings:  [x]  Laboratory  [x]  Microbiology  [x]  Radiology  []  EKG/Telemetry   []  Cardiology/Vascular   []  Pathology  []  Old records  []  Other:  Most notable findings include:    echo pending.  2023 echo with EF of 61 to 65% grade 1 diastolic dysfunction        Lab 24  0404  02/19/24  0609 02/18/24  0735 02/18/24  0406 02/17/24  0931 02/17/24  0635 02/17/24  0135 02/16/24 1923 02/16/24 1923 02/16/24  1812   WBC 8.15  --   --  7.33  --  9.14  --   --   --  7.41   HEMOGLOBIN 11.2*  --   --  11.6*  --  12.8  --   --   --  12.9   HEMATOCRIT 35.9  --   --  39.7  --  44.2  --   --   --  45.6   PLATELETS 146  --   --  159  --  164  --   --   --  187   SODIUM 133* 137  --  136  --  137  --   --   --  136   SODIUM, ARTERIAL  --   --  136.7  --  134.5*  --  133.6*  --   --   --    SODIUM, VENOUS  --   --   --   --   --   --   --   --  133.1*  --    POTASSIUM 2.7* 3.3*  --  4.0  --  4.9  --   --   --  5.8*   POTASSIUM, VENOUS  --   --   --   --   --   --   --   --  5.5*  --    CHLORIDE 83* 85*  --  91*  --  93*  --   --   --  92*   CHLORIDE, VENOUS  --   --   --   --   --   --   --   --  90*  --    CO2 47.3* 43.2*  --  41.6*  --  38.1*  --   --   --  41.7*   BUN 18 21  --  25*  --  28*  --   --   --  27*   CREATININE 0.60 0.67  --  0.77  --  0.85  --   --   --  0.83   GLUCOSE 80 132*  --  75  --  93  --   --   --  129*   GLUCOSE, ARTERIAL  --   --  78  --  144*  --  150*   < > 113*  --    CALCIUM 8.3* 8.5*  --  8.8  --  9.4  --   --   --  9.1   PHOSPHORUS 2.8 1.3*  --   --   --  2.9  --   --   --   --    TOTAL PROTEIN  --   --   --   --   --  5.4*  --   --   --  5.7*   ALBUMIN 2.4*  --   --   --   --  2.8*  --   --   --  2.9*   GLOBULIN  --   --   --   --   --  2.6  --   --   --  2.8    < > = values in this interval not displayed.       Assessment & Plan   Assessment / Plan     Active Hospital Problems:  Active Hospital Problems    Diagnosis     **Acute metabolic encephalopathy      Impression:   Acute on chronic hypoxic and hypercapnic respiratory failure requiring NIPPV, 2 L is baseline  NSTEMI likely type II  CKD stage III  Hypokalemia  Hypomagnesemia  Acute cardiogenic pulmonary edema  Acute decompensated diastolic congestive heart failure  History of mycoplasma pneumonia  History of  SLE  History of A-fib/flutter, not on anticoagulation     Plan:   -Weaned to 8 L high flow nasal cannula continue to wean supplemental oxygen to maintain SpO2 greater than 90%.  Patient's baseline is 2 L  -Encourage NIPPV wear at nighttime and as needed with naps  - drop Lasix down to 40 mg IV daily  -Continue to monitor renal panel and electrolytes.  Continue to replace potassium and magnesium aggressively  -Give Diamox one-time in setting of contraction alkalosis.  Continue to monitor serum bicarb levels  -Continue nebulizers and bronchopulmonary hygiene  -Continue meropenem for UTI  -SLP on board, appreciate input  -Palliative care on board, appreciate input  -Recommend avoiding any sedating medications  Patient is DNR/DNI        DVT prophylaxis:  Medical DVT prophylaxis orders are present.    CODE STATUS:   Medical Intervention Limits: NO intubation (DNI)  Level Of Support Discussed With: Health Care Surrogate  Code Status (Patient has no pulse and is not breathing): No CPR (Do Not Attempt to Resuscitate)  Medical Interventions (Patient has pulse or is breathing): Limited Support    Labs, imaging, microbiology, notes and medications personally reviewed  Discussed with primary    I, Dr. Brian Mantilla, have spent more than 50% of the total time managing the patient in this encounter today.  This included personally reviewing all pertinent labs, imaging, microbiology and documentation. Also discussing the case with the patient and any available family, the admitting physician and any available ancillary staff.    Electronically signed by PATRICK Snyder, 02/20/24, 1:02 PM EST.    Electronically signed by Brian Mantilla MD, 2/20/2024, 15:15 EST.

## 2024-02-20 NOTE — PROGRESS NOTES
[facility]   Hospitalist Progress Note  Date: 2024  Patient Name: Marylou Levin  : 1940  MRN: [id number]  Date of admission: 2024  Room/Bed: [facility]      Subjective   Subjective     Chief Complaint: Decreased responsiveness    Summary:Marylou Levin is a 83 y.o. female  with a past medical history of dementia, hypothyroidism, SLE, dementia, presented to the ED for evaluation of decreased responsiveness.  As per the reports present to the ED physician, patient has a steady decline over the last several weeks.  Currently staying at a nursing facility.  Patient has been nonambulatory in the past 6 months.  Usually patient noted to have some intermittent confusion but able to have conversation.  For the last 2 days patient has significant decreased in response.  Only responding to the physical symptoms.  EMS was called.  Patient noted to be hypotensive and hypoxic, started on supplemental oxygen.  Patient is unable to provide any history at this time.  Intermittently responsive.  Incoherent speech.     In the ED, vital signs temperature 91.9, pulse 79, respiratory 26, blood pressure 90s over 60s, on nonrebreather 15 L saturating around 90%.  Labs initial troponin 141, repeat troponin 146, proBNP 1 1998, VBG showed 7.25/88, sodium 136, potassium 5.8, chloride 92, bicarb 41.7, BUN 27, creatinine 0.83, rest of the CMP with no significant findings, normal ammonia, normal lactic acid, procalcitonin 0.04, urinalysis showed 21-50 WBC, 4+ bacteria, blood cultures and urine cultures in process.  COVID flu RSV negative.  Chest x-ray showed slight increase in the effusion and consolidation in the right lung bases compared to the chest x-ray on 2023.  CT head without contrast showed no acute findings.  Opacification of the mastoid air cells and middle ear cavities may reflect otomastoiditis.  Received ceftriaxone in the ED.  Patient has been admitted for further evaluation and management of acute  metabolic encephalopathy, acute hypoxic and hypercapnic respiratory failure, UTI, hyperkalemia.  On February 18thRRT called last night as patient became tachypneic, tachycardic at 135, hypoxemic at 85%.  Started on BiPAP 20/8..  IV Lasix given with good response.  Telemetry shows occasional .     Interval Followup:   Awake and alert following vocal commands.  Hard of hearing  Blood glucose stable  Remains on 8 L with 96% saturation.  Telemetry with A-fib/SVT  Patient during my rounds on nasal cannula appears comfortable and denies any complaints.  Patient with no complaints at the time of evaluation.  No urinary problems   Good urine output     Review of Systems    All systems reviewed and negative except for what is outlined above.  Summary and interval follow-up      Objective   Objective     Vitals:   Temp:  [97.3 °F (36.3 °C)-97.7 °F (36.5 °C)] 97.7 °F (36.5 °C)  Heart Rate:  [53-85] 80  Resp:  [16-22] 16  BP: ()/(50-84) 94/58  Flow (L/min):  [7-11] 7    Physical Exam   General: Awake, alert, NAD  HENT: NCAT, MMM  Eyes: pupils equal, no scleral icterus  Cardiovascular: RRR, no patient will murmurs   Pulmonary: Improved air movement bilaterally; no wheezes  Gastrointestinal: S/ND/NT, +BS  Musculoskeletal: No gross deformities  Skin: No jaundice, no rash on exposed skin appreciated  Neuro: CN II through XII grossly intact;  no tremor  Psych: Mood and affect appropriate  : No Ontiveros catheter; no suprapubic tenderness    Result Review    Result Review:  I have personally reviewed these results:  [x]  Laboratory      Lab 02/20/24  0404 02/18/24  0735 02/18/24  0406 02/17/24  0931 02/17/24  0635 02/17/24  0135 02/16/24  1923 02/16/24  1812   WBC 8.15  --  7.33  --  9.14  --   --  7.41   HEMOGLOBIN 11.2*  --  11.6*  --  12.8  --   --  12.9   HEMATOCRIT 35.9  --  39.7  --  44.2  --   --  45.6   PLATELETS 146  --  159  --  164  --   --  187   NEUTROS ABS 5.84  --  4.71  --  6.19  --   --  5.15   IMMATURE  GRANS (ABS) 0.06*  --  0.03  --  0.05  --   --  0.03   LYMPHS ABS 1.28  --  1.57  --  1.68  --   --  1.29   MONOS ABS 0.75  --  0.88  --  1.12*  --   --  0.89   EOS ABS 0.20  --  0.11  --  0.07  --   --  0.03   MCV 95.2  --  101.3*  --  104.7*  --   --  104.8*   PROCALCITONIN  --   --   --   --   --   --   --  0.04   LACTATE  --   --   --   --   --   --   --  1.5   LACTATE, ARTERIAL  --  1.33  --  1.46  --  1.71   < >  --    PROTIME  --   --   --   --   --   --   --  17.9*    < > = values in this interval not displayed.         Lab 02/20/24  0404 02/19/24  0654 02/19/24  0609 02/18/24  0735 02/18/24  0406 02/17/24  0931 02/17/24  0635 02/17/24  0135 02/16/24  1924   SODIUM 133*  --  137  --  136  --  137  --   --    SODIUM, ARTERIAL  --   --   --  136.7  --  134.5*  --    < >  --    POTASSIUM 2.7*  --  3.3*  --  4.0  --  4.9  --   --    CHLORIDE 83*  --  85*  --  91*  --  93*  --   --    CO2 47.3*  --  43.2*  --  41.6*  --  38.1*  --   --    ANION GAP 2.7*  --  8.8  --  3.4*  --  5.9  --   --    BUN 18  --  21  --  25*  --  28*  --   --    CREATININE 0.60  --  0.67  --  0.77  --  0.85  --   --    EGFR 88.1  --  85.8  --  75.7  --  67.2  --   --    GLUCOSE 80  --  132*  --  75  --  93  --   --    GLUCOSE, ARTERIAL  --   --   --  78  --  144*  --    < >  --    CALCIUM 8.3*  --  8.5*  --  8.8  --  9.4  --   --    IONIZED CALCIUM  --  0.99*  --  1.13  --  1.20  --    < >  --    MAGNESIUM 1.9  --  0.7*  --   --   --  1.6  --   --    PHOSPHORUS 2.8  --  1.3*  --   --   --  2.9  --   --    TSH  --   --   --   --   --   --   --   --  1.960    < > = values in this interval not displayed.         Lab 02/20/24  0404 02/17/24  0635 02/16/24  1812   TOTAL PROTEIN  --  5.4* 5.7*   ALBUMIN 2.4* 2.8* 2.9*   GLOBULIN  --  2.6 2.8   ALT (SGPT)  --  12 13   AST (SGOT)  --  20 21   BILIRUBIN  --  0.4 0.3   ALK PHOS  --  58 69         Lab 02/20/24  0404 02/18/24  1601 02/18/24  0634 02/18/24  0406 02/16/24  1924 02/16/24  1812   PROBNP  6,525.0*  --   --   --   --  19,598.0*   HSTROP T  --  142* 152* 155*   < > 147*   PROTIME  --   --   --   --   --  17.9*   INR  --   --   --   --   --  1.45*    < > = values in this interval not displayed.                 Lab 02/18/24  0735 02/17/24  1444 02/17/24  0931   PH, ARTERIAL 7.384 7.385 7.334*   PCO2, ARTERIAL 75.2* 66.3* 83.5*   PO2 ART 80.7 140.3* 149.7*   O2 SATURATION ART 95.5 98.7 98.8   FIO2 75 70 80   HCO3 ART 43.9* 38.8* 43.4*   BASE EXCESS ART 15.4* 11.1* 13.6*   CARBOXYHEMOGLOBIN 0.3 0.4 0.4     Brief Urine Lab Results  (Last result in the past 365 days)        Color   Clarity   Blood   Leuk Est   Nitrite   Protein   CREAT   Urine HCG        02/1940 Dark Yellow   Turbid   Trace   Large (3+)   Negative   30 mg/dL (1+)                 [x]  Microbiology   Microbiology Results (last 10 days)       Procedure Component Value - Date/Time    Respiratory Panel PCR w/COVID-19(SARS-CoV-2) ANGELICA/PHI/PATTI/PAD/COR/TK In-House, NP Swab in UTM/VTM, 2 HR TAT - Swab, Nasopharynx [[id number]]  (Normal) Collected: 02/17/24 1211    Lab Status: Final result Specimen: Swab from Nasopharynx Updated: 02/17/24 1304     ADENOVIRUS, PCR Not Detected     Coronavirus 229E Not Detected     Coronavirus HKU1 Not Detected     Coronavirus NL63 Not Detected     Coronavirus OC43 Not Detected     COVID19 Not Detected     Human Metapneumovirus Not Detected     Human Rhinovirus/Enterovirus Not Detected     Influenza A PCR Not Detected     Influenza B PCR Not Detected     Parainfluenza Virus 1 Not Detected     Parainfluenza Virus 2 Not Detected     Parainfluenza Virus 3 Not Detected     Parainfluenza Virus 4 Not Detected     RSV, PCR Not Detected     Bordetella pertussis pcr Not Detected     Bordetella parapertussis PCR Not Detected     Chlamydophila pneumoniae PCR Not Detected     Mycoplasma pneumo by PCR Not Detected    Narrative:      In the setting of a positive respiratory panel with a viral infection PLUS a negative  procalcitonin without other underlying concern for bacterial infection, consider observing off antibiotics or discontinuation of antibiotics and continue supportive care. If the respiratory panel is positive for atypical bacterial infection (Bordetella pertussis, Chlamydophila pneumoniae, or Mycoplasma pneumoniae), consider antibiotic de-escalation to target atypical bacterial infection.    S. Pneumo Ag Urine or CSF - Urine, Urine, Clean Catch [[id number]]  (Normal) Collected: 02/17/24 1210    Lab Status: Final result Specimen: Urine, Clean Catch Updated: 02/17/24 1228     Strep Pneumo Ag Negative    Legionella Antigen, Urine - Urine, Urine, Clean Catch [[id number]]  (Normal) Collected: 02/17/24 1210    Lab Status: Final result Specimen: Urine, Clean Catch Updated: 02/17/24 1229     LEGIONELLA ANTIGEN, URINE Negative    COVID-19, FLU A/B, RSV PCR 1 HR TAT - Swab, Nasopharynx [[id number]]  (Normal) Collected: 02/16/24 1950    Lab Status: Final result Specimen: Swab from Nasopharynx Updated: 02/16/24 2040     COVID19 Not Detected     Influenza A PCR Not Detected     Influenza B PCR Not Detected     RSV, PCR Not Detected    Narrative:      Fact sheet for providers: https://www.fda.gov/media/340859/download    Fact sheet for patients: https://www.fda.gov/media/467255/download    Test performed by PCR.    Blood Culture - Blood, Arm, Right [[id number]]  (Normal) Collected: 02/16/24 1946    Lab Status: Preliminary result Specimen: Blood from Arm, Right Updated: 02/19/24 2001     Blood Culture No growth at 3 days    Blood Culture - Blood, Arm, Right [[id number]]  (Abnormal) Collected: 02/16/24 1946    Lab Status: Final result Specimen: Blood from Arm, Right Updated: 02/20/24 0636     Blood Culture Staphylococcus, coagulase negative     Isolated from Aerobic and Anaerobic Bottles     Gram Stain Aerobic Bottle Gram positive cocci in clusters      Anaerobic Bottle Gram positive cocci in clusters    Narrative:      Probable  contaminant requires clinical correlation, susceptibility not performed unless requested by physician.      Blood Culture ID, PCR - Blood, Arm, Right [[id number]]  (Abnormal) Collected: 02/16/24 1946    Lab Status: Final result Specimen: Blood from Arm, Right Updated: 02/17/24 1922     BCID, PCR Staph spp, not aureus or lugdunensis. Identification by BCID2 PCR.     BOTTLE TYPE Aerobic Bottle    Urine Culture - Urine, Urine, Clean Catch [[id number]]  (Abnormal)  (Susceptibility) Collected: 02/1940    Lab Status: Final result Specimen: Urine, Clean Catch Updated: 02/18/24 1105     Urine Culture >100,000 CFU/mL Escherichia coli    Narrative:      Colonization of the urinary tract without infection is common. Treatment is discouraged unless the patient is symptomatic, pregnant, or undergoing an invasive urologic procedure.    Susceptibility        Escherichia coli      DAVIN      Amoxicillin + Clavulanate Intermediate      Ampicillin Resistant      Ampicillin + Sulbactam Resistant      Cefazolin Susceptible      Cefepime Susceptible      Ceftazidime Susceptible      Ceftriaxone Susceptible      Gentamicin Susceptible      Levofloxacin Resistant      Nitrofurantoin Susceptible      Piperacillin + Tazobactam Susceptible      Trimethoprim + Sulfamethoxazole Resistant                                 [x]  Radiology  XR Chest 1 View    Result Date: 2/18/2024   No significant interval change.       SIMON MCDONNELL MD       Electronically Signed and Approved By: SIMON MCDONNELL MD on 2/18/2024 at 23:26             XR Chest 1 View    Result Date: 2/17/2024    1. Mild-to-moderate bilateral perihilar and basilar airspace opacity suspected to represent pulmonary edema.  Superimposed pneumonia is not excluded 2. Suspected small bilateral pleural effusions       Tavo Law M.D.       Electronically Signed and Approved By: Tavo Law M.D. on 2/17/2024 at 8:14             CT Head Without Contrast    Result Date: 2/16/2024   1. No  acute findings in the brain. 2. Opacification of the mastoid air cells and middle ear cavities may reflect otomastoiditis.      SIMON MCDONNELL MD       Electronically Signed and Approved By: SIMON MCDONNELL MD on 2/16/2024 at 22:17            []  EKG/Telemetry   [x]  Cardiology/Vascular     2D echo    Left ventricular ejection fraction appears to be 56 - 60%.    Left ventricular wall thickness is consistent with septal asymmetric hypertrophy.    Left ventricular diastolic function is consistent with (grade I) impaired relaxation and age.    The right ventricular cavity is mildly dilated.    Mild to moderate tricuspid regurgitation.    []  Pathology  []  Old records  []  Other:    Assessment & Plan   Assessment / Plan     Assessment:  Acute metabolic encephalopathy DDX: UTI, worsening dementia, hypercapnia  Acute hypoxic and hypercapnic respiratory failure  E. coli UTI  Coagulase-negative staph in blood culture.  Likely contamination  Hyperkalemia.  Resolved  Elevated troponin likely NSTEMI type II from hypoxia, CHF exacerbation  Acute on chronic diastolic congestive heart failure  Hypothyroidism  History of SLE  Mild to moderate mitral valve regurgitation  Episode of hypoglycemia    Plan:  Continue to monitor on telemetry  Agree with IV Diamox  Resumed home metoprolol..  IV Lopressor.  Resume home Synthroid and KCl.  Follow-up on blood cultures.  Coagulase-negative staph.  Most likely contamination.  DC meropenem.  E. coli MDR but sensitive to Rocephin.  Continue BiPAP at bedtime  Pulmonology consulted for further assistance in management.  Consult is greatly appreciated.    TSH within normal limits  2D echo results noted.  Continue Lasix.  BNP trending down  Monitor strict I's and O's and daily weights  All eval performed this morning patient initiated on diet.  POCT glucose every 2 hours.  On D5 water drip because of hypoglycemia  Palliative care consulted.  Patient is DNR/DNI.  Discussed with son and palliative  care.  Recommend comfort measures because of dementia, patient's age and comorbidities.  Son to make decision    Appreciate speech therapy input started on puréed solids and thin liquids.  Discussed with RN and .  Return to nursing home in next couple of days if oxygen improves    DVT prophylaxis:  Medical DVT prophylaxis orders are present.        CODE STATUS:   Medical Intervention Limits: NO intubation (DNI)  Level Of Support Discussed With: Health Care Surrogate  Code Status (Patient has no pulse and is not breathing): No CPR (Do Not Attempt to Resuscitate)  Medical Interventions (Patient has pulse or is breathing): Limited Support      Electronically signed by Jayme Sahu MD, 2/20/2024, 17:31 EST.

## 2024-02-20 NOTE — PLAN OF CARE
Goal Outcome Evaluation:  Plan of Care Reviewed With: son         Pt is alert and oriented to self, pt is on 12 L high flow oxygen and it sating 98%. Pt is bed bound and has a novoa catheter in place. Pt has been a-fib on the  monitor.

## 2024-02-20 NOTE — PROGRESS NOTES
Respiratory Therapist Broncho-Pulmonary Hygiene Progress Note      Patient Name:  Marylou Levin  YOB: 1940    Marylou Levin meets the qualification for Level 2 of the Bronco-Pulmonary Hygiene Protocol. This was based on my daily patient assessment and includes review of chest x-ray results, cough ability and quality, oxygenation, secretions or risk for secretion development and patient mobility.     Broncho-Pulmonary Hygiene Assessment:    Level of Movement: Actively changing positions-requires assistance  Disoriented/Follows Commands    Breath Sounds: Diminished and/or coarse rhonchi    Cough: Ineffective, weak or not cough efforts    Chest X-Ray: Mild consolidation and/or atelectasis.    Sputum Productions: None or small amount of thin or watery secretions with effective cough    History and Physical: New onset of bronchitis or existing chronic pulmonary conditions.  **(not in an exacerbation)    SpO2 to Oxygen Need: greater than 90% on  greater than 6L, Vapotherm, oxygen mask greater than 40% or ventilator    Current SpO2 is: 96 on 8L    Based on this information, I have completed the following interventions: PAP with [employer]      Electronically signed by Ree Alcala RRT, 02/20/24, 8:37 AM EST.

## 2024-02-20 NOTE — PROGRESS NOTES
RT EQUIPMENT DEVICE RELATED - SKIN ASSESSMENT    RT Medical Equipment/Device:     NIV Mask:  Under-the-nose   size:     Skin Assessment:      Cheek:  Intact  Nares:  Intact  Nose:  Intact  Lips:  Intact  Mouth:  Intact    Device Skin Pressure Protection:  Pressure points protected    Nurse Notification:  Marce Alcala, RRT

## 2024-02-20 NOTE — PLAN OF CARE
Goal Outcome Evaluation:  Plan of Care Reviewed With: son        Progress: improving       Pt is alert and oriented to self, vss, able to wean patient to 7L high flow nasal cannula. Pt has had multiple loose stools, after receiving one dose of pericolace. Pt is unable to get out of bed and is bedbound. Pt has novoa catheter in place and novoa care was completed.

## 2024-02-20 NOTE — NURSING NOTE
The patient is on 5 MARI, the patients son spoke with Dr Sahu in the [facility] and recommended comfort. The son recently lost his father and is having a hard time.  notified and the son and I discussed having [facility] back at [facility] and the son Alessandro agrees to listening to them. A referral was placed for [facility] for an EOS. Emotional support provided.    -Fouzia YOUNG, RN, [facility]   Palliative Care    2/20/2024 15:07 EST

## 2024-02-20 NOTE — PLAN OF CARE
Goal Outcome Evaluation:         Patient resting on 8L humidified nasal  cannula. Bipap is at bedside on standby mode.

## 2024-02-21 LAB
ALBUMIN SERPL-MCNC: 2.5 G/DL (ref 3.5–5.2)
ANION GAP SERPL CALCULATED.3IONS-SCNC: 5.4 MMOL/L (ref 5–15)
BACTERIA SPEC AEROBE CULT: NORMAL
BUN SERPL-MCNC: 17 MG/DL (ref 8–23)
BUN/CREAT SERPL: 24.6 (ref 7–25)
CALCIUM SPEC-SCNC: 8.7 MG/DL (ref 8.6–10.5)
CHLORIDE SERPL-SCNC: 81 MMOL/L (ref 98–107)
CO2 SERPL-SCNC: 43.6 MMOL/L (ref 22–29)
CREAT SERPL-MCNC: 0.69 MG/DL (ref 0.57–1)
EGFRCR SERPLBLD CKD-EPI 2021: 85.2 ML/MIN/1.73
GLUCOSE BLDC GLUCOMTR-MCNC: 105 MG/DL (ref 70–99)
GLUCOSE BLDC GLUCOMTR-MCNC: 86 MG/DL (ref 70–99)
GLUCOSE BLDC GLUCOMTR-MCNC: 87 MG/DL (ref 70–99)
GLUCOSE BLDC GLUCOMTR-MCNC: 91 MG/DL (ref 70–99)
GLUCOSE SERPL-MCNC: 72 MG/DL (ref 65–99)
PHOSPHATE SERPL-MCNC: 2.5 MG/DL (ref 2.5–4.5)
POTASSIUM SERPL-SCNC: 2.8 MMOL/L (ref 3.5–5.2)
SODIUM SERPL-SCNC: 130 MMOL/L (ref 136–145)
WHOLE BLOOD HOLD SPECIMEN: NORMAL

## 2024-02-21 PROCEDURE — 80069 RENAL FUNCTION PANEL: CPT | Performed by: INTERNAL MEDICINE

## 2024-02-21 PROCEDURE — 82948 REAGENT STRIP/BLOOD GLUCOSE: CPT

## 2024-02-21 PROCEDURE — 25010000002 MAGNESIUM SULFATE 2 GM/50ML SOLUTION: Performed by: INTERNAL MEDICINE

## 2024-02-21 PROCEDURE — 94799 UNLISTED PULMONARY SVC/PX: CPT

## 2024-02-21 PROCEDURE — 94664 DEMO&/EVAL PT USE INHALER: CPT

## 2024-02-21 PROCEDURE — 97161 PT EVAL LOW COMPLEX 20 MIN: CPT

## 2024-02-21 PROCEDURE — 99233 SBSQ HOSP IP/OBS HIGH 50: CPT | Performed by: INTERNAL MEDICINE

## 2024-02-21 PROCEDURE — 25010000002 CEFTRIAXONE PER 250 MG: Performed by: INTERNAL MEDICINE

## 2024-02-21 PROCEDURE — 25010000002 ACETAZOLAMIDE PER 500 MG: Performed by: INTERNAL MEDICINE

## 2024-02-21 PROCEDURE — 25010000002 HEPARIN (PORCINE) PER 1000 UNITS: Performed by: STUDENT IN AN ORGANIZED HEALTH CARE EDUCATION/TRAINING PROGRAM

## 2024-02-21 PROCEDURE — 99232 SBSQ HOSP IP/OBS MODERATE 35: CPT | Performed by: INTERNAL MEDICINE

## 2024-02-21 PROCEDURE — 94760 N-INVAS EAR/PLS OXIMETRY 1: CPT

## 2024-02-21 PROCEDURE — 82948 REAGENT STRIP/BLOOD GLUCOSE: CPT | Performed by: INTERNAL MEDICINE

## 2024-02-21 PROCEDURE — 25010000002 FUROSEMIDE PER 20 MG: Performed by: INTERNAL MEDICINE

## 2024-02-21 RX ORDER — ECHINACEA PURPUREA EXTRACT 125 MG
2 TABLET ORAL 4 TIMES DAILY
Status: DISCONTINUED | OUTPATIENT
Start: 2024-02-21 | End: 2024-02-22 | Stop reason: HOSPADM

## 2024-02-21 RX ORDER — ACETAZOLAMIDE 500 MG/5ML
500 INJECTION, POWDER, LYOPHILIZED, FOR SOLUTION INTRAVENOUS ONCE
Status: COMPLETED | OUTPATIENT
Start: 2024-02-21 | End: 2024-02-21

## 2024-02-21 RX ORDER — POTASSIUM CHLORIDE 750 MG/1
40 CAPSULE, EXTENDED RELEASE ORAL
Status: COMPLETED | OUTPATIENT
Start: 2024-02-21 | End: 2024-02-21

## 2024-02-21 RX ORDER — MAGNESIUM SULFATE HEPTAHYDRATE 40 MG/ML
2 INJECTION, SOLUTION INTRAVENOUS
Status: COMPLETED | OUTPATIENT
Start: 2024-02-21 | End: 2024-02-21

## 2024-02-21 RX ORDER — ROPINIROLE 0.25 MG/1
0.5 TABLET, FILM COATED ORAL NIGHTLY
Status: DISCONTINUED | OUTPATIENT
Start: 2024-02-21 | End: 2024-02-22 | Stop reason: HOSPADM

## 2024-02-21 RX ADMIN — BUDESONIDE 0.5 MG: 0.5 INHALANT ORAL at 18:18

## 2024-02-21 RX ADMIN — MAGNESIUM SULFATE HEPTAHYDRATE 2 G: 2 INJECTION, SOLUTION INTRAVENOUS at 12:00

## 2024-02-21 RX ADMIN — HEPARIN SODIUM 5000 UNITS: 5000 INJECTION INTRAVENOUS; SUBCUTANEOUS at 21:57

## 2024-02-21 RX ADMIN — Medication 10 ML: at 09:43

## 2024-02-21 RX ADMIN — METOPROLOL SUCCINATE 12.5 MG: 25 TABLET, EXTENDED RELEASE ORAL at 09:44

## 2024-02-21 RX ADMIN — POTASSIUM CHLORIDE 40 MEQ: 10 CAPSULE, COATED, EXTENDED RELEASE ORAL at 09:45

## 2024-02-21 RX ADMIN — POTASSIUM CHLORIDE 40 MEQ: 10 CAPSULE, COATED, EXTENDED RELEASE ORAL at 17:28

## 2024-02-21 RX ADMIN — ARFORMOTEROL TARTRATE 15 MCG: 15 SOLUTION RESPIRATORY (INHALATION) at 18:18

## 2024-02-21 RX ADMIN — LEVOTHYROXINE SODIUM 125 MCG: 125 TABLET ORAL at 06:03

## 2024-02-21 RX ADMIN — Medication 10 ML: at 21:58

## 2024-02-21 RX ADMIN — DICLOFENAC SODIUM 2 G: 10 GEL TOPICAL at 21:56

## 2024-02-21 RX ADMIN — HEPARIN SODIUM 5000 UNITS: 5000 INJECTION INTRAVENOUS; SUBCUTANEOUS at 06:03

## 2024-02-21 RX ADMIN — MAGNESIUM SULFATE HEPTAHYDRATE 2 G: 2 INJECTION, SOLUTION INTRAVENOUS at 09:45

## 2024-02-21 RX ADMIN — CEFTRIAXONE SODIUM 1000 MG: 1 INJECTION, POWDER, FOR SOLUTION INTRAMUSCULAR; INTRAVENOUS at 15:56

## 2024-02-21 RX ADMIN — ACETAMINOPHEN 650 MG: 325 TABLET ORAL at 06:03

## 2024-02-21 RX ADMIN — POTASSIUM CHLORIDE 40 MEQ: 10 CAPSULE, COATED, EXTENDED RELEASE ORAL at 09:43

## 2024-02-21 RX ADMIN — DOCUSATE SODIUM 50MG AND SENNOSIDES 8.6MG 2 TABLET: 8.6; 5 TABLET, FILM COATED ORAL at 09:43

## 2024-02-21 RX ADMIN — ARFORMOTEROL TARTRATE 15 MCG: 15 SOLUTION RESPIRATORY (INHALATION) at 06:58

## 2024-02-21 RX ADMIN — ROPINIROLE HYDROCHLORIDE 0.5 MG: 0.25 TABLET, FILM COATED ORAL at 21:57

## 2024-02-21 RX ADMIN — SALINE NASAL SPRAY 2 SPRAY: 1.5 SOLUTION NASAL at 17:28

## 2024-02-21 RX ADMIN — HEPARIN SODIUM 5000 UNITS: 5000 INJECTION INTRAVENOUS; SUBCUTANEOUS at 13:54

## 2024-02-21 RX ADMIN — ACETAZOLAMIDE 500 MG: 500 INJECTION, POWDER, LYOPHILIZED, FOR SOLUTION INTRAVENOUS at 09:43

## 2024-02-21 RX ADMIN — FAMOTIDINE 20 MG: 20 TABLET ORAL at 21:57

## 2024-02-21 RX ADMIN — BUDESONIDE 0.5 MG: 0.5 INHALANT ORAL at 06:59

## 2024-02-21 RX ADMIN — POTASSIUM CHLORIDE 40 MEQ: 10 CAPSULE, COATED, EXTENDED RELEASE ORAL at 13:54

## 2024-02-21 RX ADMIN — ACETAMINOPHEN 650 MG: 325 TABLET ORAL at 21:56

## 2024-02-21 RX ADMIN — FUROSEMIDE 40 MG: 10 INJECTION, SOLUTION INTRAMUSCULAR; INTRAVENOUS at 09:43

## 2024-02-21 RX ADMIN — SALINE NASAL SPRAY 2 SPRAY: 1.5 SOLUTION NASAL at 21:58

## 2024-02-21 RX ADMIN — ASPIRIN 81 MG: 81 TABLET, COATED ORAL at 09:43

## 2024-02-21 NOTE — THERAPY EVALUATION
Acute Care - Physical Therapy Initial Evaluation   [facility]     Patient Name: Marylou Levin  : 1940  MRN: [id number]  Today's Date: 2024      Visit Dx:     ICD-10-CM ICD-9-CM   1. Acute respiratory failure with hypoxia  J96.01 518.81   2. Acute pulmonary edema  J81.0 518.4   3. Urinary tract infection without hematuria, site unspecified  N39.0 599.0   4. Hyperkalemia  E87.5 276.7   5. Elevated troponin  R79.89 790.6   6. Oropharyngeal dysphagia  R13.12 787.22   7. Difficulty in walking  R26.2 719.7     Patient Active Problem List   Diagnosis    Dyspnea    Acute-on-chronic respiratory failure    Pneumonia due to infectious organism    Hypothyroidism (acquired)    History of dementia    Pneumonia due to Mycoplasma pneumoniae    Asthma    Hyperlipidemia    Hypertension    Rheumatoid arthritis    Stage 4 chronic kidney disease    Systemic lupus erythematosus    Acute metabolic encephalopathy     Past Medical History:   Diagnosis Date    Atrial fibrillation     Dementia     pt son    Difficulty walking     Hypokalemia     Hypothyroidism     Idiopathic progressive neuropathy     Muscle weakness     Systemic lupus      Past Surgical History:   Procedure Laterality Date    SPINAL FUSION      provided by pt son     PT Assessment (last 12 hours)       PT Evaluation and Treatment       Row Name 24 1100          Physical Therapy Time and Intention    Subjective Information complains of;weakness;pain (P)   in b/l LE  -DG     Document Type evaluation (P)   -DG     Mode of Treatment individual therapy;physical therapy (P)   -DG     Patient Effort adequate (P)   -DG     Symptoms Noted During/After Treatment none (P)   -DG       Row Name 24 1100          General Information    Patient Profile Reviewed yes (P)   -DG     Patient Observations alert;agree to therapy;poorly cooperative (P)   -DG     Prior Level of Function mod assist:;all household mobility (P)   -DG     Equipment Currently Used at Home other  (see comments) (P)   Pt unable to answer  -DG     Existing Precautions/Restrictions fall (P)   -DG     Limitations/Impairments safety/cognitive (P)   -DG     Barriers to Rehab cognitive status (P)   -DG       Row Name 24 1100          Living Environment    Current Living Arrangements extended care facility (P)   per medical records  -DG     People in Home facility resident (P)   -DG       Row Name 24 1100          Cognition    Cognitive Status Pt pleasantly confused when prompted to complete specific functional mobility tasks. Pt was AOx3 with person, , and place. (P)   -DG     Affect/Mental Status (Cognition) confused (P)   -DG     Follows Commands (Cognition) follows one-step commands;repetition of directions required (P)   -DG       Row Name 24 1100          Range of Motion (ROM)    Range of Motion bilateral lower extremities (P)   AROM Hip flex 30 degrees. PROM b/l hip flex. 45 degrees d/t pain and knee flex 60 degrees d/t pain  -DG       Row Name 24 1100          Strength (Manual Muscle Testing)    Strength (Manual Muscle Testing) -- (P)   Unable to test d/t cognition  -DG       Row Name 24 1100          Bed Mobility    Bed Mobility rolling left;rolling right;supine-sit-supine (P)   -DG     Rolling Left [place] (Bed Mobility) not tested;other (see comments) (P)   Pt declined d/t cog/pain  -DG     Rolling Right [place] (Bed Mobility) minimum assist (75% patient effort) (P)   Prompted 4x before pt completed task  -DG     Supine-Sit-Supine [place] (Bed Mobility) not tested;other (see comments) (P)   Pt declined d/t pain  -DG     Bed Mobility, Safety Issues cognitive deficits limit understanding (P)   -DG       Row Name 24 1100          Transfers    Transfers other (see comments) (P)   -DG     Comment, (Transfers) Not tested - pt declined to attempt (P)   -DG       Row Name 24 1100          Gait/Stairs (Locomotion)    Patient was able to Ambulate no, other  medical factors prevent ambulation (P)   -DG     Reason Patient was unable to Ambulate Non-Ambulatory at Baseline (P)   -DG       Row Name 02/21/24 1100          Safety Issues, Functional Mobility    Safety Issues Affecting Function (Mobility) ability to follow commands;impulsivity (P)   -DG     Impairments Affecting Function (Mobility) balance;cognition;endurance/activity tolerance;pain;range of motion (ROM);strength (P)   -DG       Row Name             Wound medial sacral spine Pressure Injury    Wound - Properties Group Present on Original Admission: Y  -LD Orientation: medial  -LD Location: sacral spine  -LD Primary Wound Type: Pressure inj  -LD    Retired Wound - Properties Group Present on Original Admission: Y  -LD Orientation: medial  -LD Location: sacral spine  -LD Primary Wound Type: Pressure inj  -LD    Retired Wound - Properties Group Present on Original Admission: Y  -LD Location: sacral spine  -LD Primary Wound Type: Pressure inj  -LD      Row Name             Wound Right posterior ankle Venous Ulcer    Wound - Properties Group Present on Original Admission: Y  -LD Side: Right  -LD Orientation: posterior  -LD Location: ankle  -LD Primary Wound Type: Venous ulcer  -LD    Retired Wound - Properties Group Present on Original Admission: Y  -LD Side: Right  -LD Orientation: posterior  -LD Location: ankle  -LD Primary Wound Type: Venous ulcer  -LD    Retired Wound - Properties Group Present on Original Admission: Y  -LD Side: Right  -LD Location: ankle  -LD Primary Wound Type: Venous ulcer  -LD      Row Name 02/21/24 1100          Plan of Care Review    Plan of Care Reviewed With patient (P)   -DG     Outcome Evaluation Patient presents with deficits in strength, ROM, balance, activity tolerance/endurance, and functional mobility. Skilled PT is required to address these deficits and return to prior level of function. (P)   -DG       Row Name 02/21/24 1100          Positioning and Restraints    Pre-Treatment  Position in bed (P)   -DG     Post Treatment Position bed (P)   -DG     In Bed exit alarm on;encouraged to call for assist;call light within reach (P)   -DG       Row Name 02/21/24 1100          Therapy Assessment/Plan (PT)    Rehab Potential (PT) good, to achieve stated therapy goals (P)   -DG     Criteria for Skilled Interventions Met (PT) yes;skilled treatment is necessary (P)   -DG     Therapy Frequency (PT) daily (P)   -DG     Predicted Duration of Therapy Intervention (PT) 10 days (P)   -DG     Problem List (PT) problems related to;balance;cognition;mobility;strength;range of motion (ROM) (P)   -DG     Activity Limitations Related to Problem List (PT) unable to ambulate safely;unable to transfer safely (P)   -DG       Row Name 02/21/24 1100          PT Evaluation Complexity    History, PT Evaluation Complexity 1-2 personal factors and/or comorbidities (P)   -DG     Examination of Body Systems (PT Eval Complexity) total of 4 or more elements (P)   -DG     Clinical Presentation (PT Evaluation Complexity) stable (P)   -DG     Clinical Decision Making (PT Evaluation Complexity) low complexity (P)   -DG     Overall Complexity (PT Evaluation Complexity) low complexity (P)   -DG       Row Name 02/21/24 1100          Therapy Plan Review/Discharge Plan (PT)    Therapy Plan Review (PT) patient (P)   -DG       Row Name 02/21/24 1100          Physical Therapy Goals    Bed Mobility Goal Selection (PT) bed mobility, PT goal 1 (P)   -DG     Transfer Goal Selection (PT) transfer, PT goal 1 (P)   -DG       Row Name 02/21/24 1100          Bed Mobility Goal 1 (PT)    Activity/Assistive Device (Bed Mobility Goal 1, PT) sit to supine/supine to sit (P)   -DG     [place] Level/Cues Needed (Bed Mobility Goal 1, PT) contact guard required (P)   -DG     Time Frame (Bed Mobility Goal 1, PT) 10 days (P)   -DG       Row Name 02/21/24 1100          Transfer Goal 1 (PT)    Activity/Assistive Device (Transfer Goal 1, PT)  sit-to-stand/stand-to-sit;walker, rolling (P)   -DG     [place] Level/Cues Needed (Transfer Goal 1, PT) minimum assist (75% or more patient effort) (P)   -DG     Time Frame (Transfer Goal 1, PT) 10 days (P)   -DG               User Key  (r) = Recorded By, (t) = Taken By, (c) = Cosigned By      Initials Name Provider Type    Darcy Villalba, RN Registered Nurse    Adamaris Campbell, PT Student PT Student                      PT Recommendation and Plan  Anticipated Discharge Disposition (PT): (P) sub acute care setting  Planned Therapy Interventions (PT): (P) balance training, bed mobility training, gait training, patient/family education, ROM (range of motion), strengthening, stretching, transfer training  Therapy Frequency (PT): (P) daily  Plan of Care Reviewed With: (P) patient  Outcome Evaluation: (P) Patient presents with deficits in strength, ROM, balance, activity tolerance/endurance, and functional mobility. Skilled PT is required to address these deficits and return to prior level of function.   Outcome Measures       Row Name 02/21/24 1100             How much help from another person do you currently need...    Turning from your back to your side while in flat bed without using bedrails? 3 (P)   -DG      Moving from lying on back to sitting on the side of a flat bed without bedrails? 1 (P)   -DG      Moving to and from a bed to a chair (including a wheelchair)? 1 (P)   -DG      Standing up from a chair using your arms (e.g., wheelchair, bedside chair)? 1 (P)   -DG      Climbing 3-5 steps with a railing? 1 (P)   -DG      To walk in hospital room? 1 (P)   -DG      AM-PAC 6 Clicks Score (PT) 8 (P)   -DG      Highest Level of Mobility Goal 3 --> Sit at edge of bed (P)   -DG         Functional Assessment    Outcome Measure Options AM-PAC 6 Clicks Basic Mobility (PT) (P)   -DG                User Key  (r) = Recorded By, (t) = Taken By, (c) = Cosigned By      Initials Name Provider Type    Adamaris Campbell, PT  Student PT Student                     Time Calculation:    PT Charges       Row Name 02/21/24 1150             Time Calculation    PT Received On 02/21/24 (P)   -DG      PT Goal Re-Cert Due Date 03/01/24 (P)   -DG         Untimed Charges    PT Eval/Re-eval Minutes 33 (P)   -DG         Total Minutes    Untimed Charges Total Minutes 33 (P)   -DG       Total Minutes 33 (P)   -DG                User Key  (r) = Recorded By, (t) = Taken By, (c) = Cosigned By      Initials Name Provider Type    Adamaris Campbell, PT Student PT Student                  Therapy Charges for Today       Code Description Service Date Service Provider Modifiers Qty    40516471565 HC PT EVAL LOW COMPLEXITY 3 2/21/2024 Adamaris Girard, PT Student GP 1            PT G-Codes  Outcome Measure Options: (P) AM-PAC 6 Clicks Basic Mobility (PT)  AM-PAC 6 Clicks Score (PT): (P) 8    Adamaris Girard PT Student  2/21/2024

## 2024-02-21 NOTE — PROGRESS NOTES
Pulmonary / Critical Care Progress Note      Patient Name: Marylou Levin  : 1940  MRN: [id number]  Primary Care Physician:  Brady Rae MD  Date of admission: 2024    Subjective   Subjective   Follow-up for hypoxic and hypercapnic respiratory failure requiring NIPPV    No acute events overnight     This morning,  Lying in bed  Currently on 6 L HFNC  Remains pleasantly confused  Dyspnea continues to improve, exertional dyspnea remains  No fever or chills  Denies chest pain or chest tightness  Denies cough  Remains weak and fatigued  Diuresing well  -2.2 L fluid balance    Objective   Objective     Vitals:   Temp:  [97.3 °F (36.3 °C)-98.6 °F (37 °C)] 97.7 °F (36.5 °C)  Heart Rate:  [58-90] 58  Resp:  [18] 18  BP: (102-141)/(44-83) 124/82  Flow (L/min):  [6] 6    Physical Exam   Vital Signs Reviewed   General: Alert, NAD.  Chronically ill-appearing elderly woman, lying in bed  Chest: Decreased aeration with improving crackles on auscultation bilaterally, no work of breathing noted   CV: Sinus tachycardia, no MGR, pulses 2+, equal.  Abd:  Soft, NT, ND, + BS, no HSM, obese  EXT:  no clubbing, no cyanosis, no edema  Neuro:  A&Ox1, CN grossly intact, no focal deficits.  Skin: No rashes or lesions noted    Result Review    Result Review:  I have personally reviewed the results from the time of this admission to 2024 15:39 EST and agree with these findings:  [x]  Laboratory  [x]  Microbiology  [x]  Radiology  []  EKG/Telemetry   []  Cardiology/Vascular   []  Pathology  []  Old records  []  Other:  Most notable findings include:    echo with EF 56 to 60%, grade 1 diastolic dysfunction        Lab 24  0421 24  0404 24  0609 24  0735 24  0406 24  0931 24  0635 24  0135 24  1923 24  1812   WBC  --  8.15  --   --  7.33  --  9.14  --   --  7.41   HEMOGLOBIN  --  11.2*  --   --  11.6*  --  12.8  --   --  12.9   HEMATOCRIT  --  35.9  --    --  39.7  --  44.2  --   --  45.6   PLATELETS  --  146  --   --  159  --  164  --   --  187   SODIUM 130* 133* 137  --  136  --  137  --   --  136   SODIUM, ARTERIAL  --   --   --  136.7  --  134.5*  --    < >  --   --    SODIUM, VENOUS  --   --   --   --   --   --   --   --  133.1*  --    POTASSIUM 2.8* 2.7* 3.3*  --  4.0  --  4.9  --   --  5.8*   POTASSIUM, VENOUS  --   --   --   --   --   --   --   --  5.5*  --    CHLORIDE 81* 83* 85*  --  91*  --  93*  --   --  92*   CHLORIDE, VENOUS  --   --   --   --   --   --   --   --  90*  --    CO2 43.6* 47.3* 43.2*  --  41.6*  --  38.1*  --   --  41.7*   BUN 17 18 21  --  25*  --  28*  --   --  27*   CREATININE 0.69 0.60 0.67  --  0.77  --  0.85  --   --  0.83   GLUCOSE 72 80 132*  --  75  --  93  --   --  129*   GLUCOSE, ARTERIAL  --   --   --  78  --  144*  --    < > 113*  --    CALCIUM 8.7 8.3* 8.5*  --  8.8  --  9.4  --   --  9.1   PHOSPHORUS 2.5 2.8 1.3*  --   --   --  2.9  --   --   --    TOTAL PROTEIN  --   --   --   --   --   --  5.4*  --   --  5.7*   ALBUMIN 2.5* 2.4*  --   --   --   --  2.8*  --   --  2.9*   GLOBULIN  --   --   --   --   --   --  2.6  --   --  2.8    < > = values in this interval not displayed.       Assessment & Plan   Assessment / Plan     Active Hospital Problems:  Active Hospital Problems    Diagnosis     **Acute metabolic encephalopathy      Impression:   Acute on chronic hypoxic and hypercapnic respiratory failure requiring NIPPV, 2 L is baseline  NSTEMI likely type II  CKD stage III  Hypokalemia  Hypomagnesemia  Acute cardiogenic pulmonary edema  Acute decompensated diastolic congestive heart failure  History of mycoplasma pneumonia  History of SLE  History of A-fib/flutter, not on anticoagulation     Plan:   -Continue to wean O2 to maintain SpO2 greater than 90%.  Patient's baseline is 2 L  -Encourage NIPPV wear at nighttime and as needed with naps  -Continue Lasix 40 mg IV daily  -Repeat Diamox x 1 given contraction alkalosis.  Continue  to monitor serum bicarb.  -Continue to monitor renal panel and electrolytes.  Replace potassium orally/iv. and magnesium IV.  -Continue nebulizers and bronchopulmonary hygiene  -Continue ceftriaxone for UTI  -SLP on board, appreciate assistance  -Palliative care on board, appreciate assistance  -Recommend avoiding any sedating medications  -Patient is DNR/DNI     DVT prophylaxis:  Medical DVT prophylaxis orders are present.    CODE STATUS:   Medical Intervention Limits: NO intubation (DNI)  Level Of Support Discussed With: Health Care Surrogate  Code Status (Patient has no pulse and is not breathing): No CPR (Do Not Attempt to Resuscitate)  Medical Interventions (Patient has pulse or is breathing): Limited Support    Labs, imaging, microbiology, notes and medications personally reviewed  Discussed with primary    I, Dr. Brian Mantilla, have spent more than 50% of the total time managing the patient in this encounter today.  This included personally reviewing all pertinent labs, imaging, microbiology and documentation. Also discussing the case with the patient and any available family, the admitting physician and any available ancillary staff.    Electronically signed by PATRICK Burgos, 02/21/24, 1:08 PM EST.    Electronically signed by Brian Mantilla MD, 2/21/2024, 15:39 EST.

## 2024-02-21 NOTE — PLAN OF CARE
Goal Outcome Evaluation:  Plan of Care Reviewed With: (P) patient           Outcome Evaluation: (P) Patient presents with deficits in strength, ROM, balance, activity tolerance/endurance, and functional mobility. Skilled PT is required to address these deficits and return to prior level of function.      Anticipated Discharge Disposition (PT): (P) sub acute care setting

## 2024-02-21 NOTE — PLAN OF CARE
Goal Outcome Evaluation:              Outcome Evaluation: Patient off bipap this morning, on high flow nasal cannula @ 6lpm, spo2 92%.

## 2024-02-21 NOTE — SIGNIFICANT NOTE
02/21/24 1130   Spiritual Care   Use of Spiritual Resources non-Evangelical use of spiritual care   Spiritual Care Source  initiative   Spiritual Care Follow-Up follow-up planned regularly for general support   Response to Spiritual Care emotion expressed;engaged in conversation;thanks expressed  (pt intermittently confused during visit, but able to re-direct)   Spiritual Care Interventions supportive conversation provided   Spiritual Care Visit Type follow-up   Spiritual Care Request coping/stress of illness support;family support;spiritual/moral support   Receptivity to Spiritual Care visit welcomed

## 2024-02-21 NOTE — PROGRESS NOTES
[facility]   Hospitalist Progress Note  Date: 2024  Patient Name: Marylou Levin  : 1940  MRN: [id number]  Date of admission: 2024  Room/Bed: [facility]      Subjective   Subjective     Chief Complaint: Decreased responsiveness    Summary:Marylou Levin is a 83 y.o. female  with a past medical history of dementia, hypothyroidism, SLE, dementia, presented to the ED for evaluation of decreased responsiveness.  As per the reports present to the ED physician, patient has a steady decline over the last several weeks.  Currently staying at a nursing facility.  Patient has been nonambulatory in the past 6 months.  Usually patient noted to have some intermittent confusion but able to have conversation.  For the last 2 days patient has significant decreased in response.  Only responding to the physical symptoms.  EMS was called.  Patient noted to be hypotensive and hypoxic, started on supplemental oxygen.  Patient is unable to provide any history at this time.  Intermittently responsive.  Incoherent speech.     In the ED, vital signs temperature 91.9, pulse 79, respiratory 26, blood pressure 90s over 60s, on nonrebreather 15 L saturating around 90%.  Labs initial troponin 141, repeat troponin 146, proBNP 1 1998, VBG showed 7.25/88, sodium 136, potassium 5.8, chloride 92, bicarb 41.7, BUN 27, creatinine 0.83, rest of the CMP with no significant findings, normal ammonia, normal lactic acid, procalcitonin 0.04, urinalysis showed 21-50 WBC, 4+ bacteria, blood cultures and urine cultures in process.  COVID flu RSV negative.  Chest x-ray showed slight increase in the effusion and consolidation in the right lung bases compared to the chest x-ray on 2023.  CT head without contrast showed no acute findings.  Opacification of the mastoid air cells and middle ear cavities may reflect otomastoiditis.  Received ceftriaxone in the ED.  Patient has been admitted for further evaluation and management of acute  metabolic encephalopathy, acute hypoxic and hypercapnic respiratory failure, UTI, hyperkalemia.  On February 18th RRT called last night as patient became tachypneic, tachycardic at 135, hypoxemic at 85%.  Started on BiPAP 20/8..  IV Lasix given with good response.  Telemetry shows occasional .   Patient followed by pulmonary.    Interval Followup:   Awake and alert following vocal commands.  Oriented.  Hard of hearing  Blood glucose stable  Weaning down oxygen now on 6 L with 96% saturation.  Telemetry with sinus rhythm with PVCs  Complaining of nasal congestion and drainage.  Patient during my rounds on nasal cannula appears comfortable and denies any complaints.  States shortness of air is better.  No urinary problems   Good urine output.  -2.2 L  Regular BM    Review of Systems    All systems reviewed and negative except for what is outlined above.  Summary and interval follow-up      Objective   Objective     Vitals:   Temp:  [97.3 °F (36.3 °C)-98.6 °F (37 °C)] 97.7 °F (36.5 °C)  Heart Rate:  [58-90] 58  Resp:  [18] 18  BP: (102-141)/(44-83) 124/82  Flow (L/min):  [6] 6    Physical Exam   General: Awake, alert, NAD  HENT: NCAT, MMM  Eyes: pupils equal, no scleral icterus  Cardiovascular: RRR, no patient will murmurs   Pulmonary: Improved air movement bilaterally; no wheezes  Gastrointestinal: S/ND/NT, +BS  Musculoskeletal: No gross deformities  Skin: No jaundice, no rash on exposed skin appreciated  Neuro: CN II through XII grossly intact;  no tremor  Psych: Mood and affect appropriate  : No Ontiveros catheter; no suprapubic tenderness    Result Review    Result Review:  I have personally reviewed these results:  [x]  Laboratory      Lab 02/20/24  0404 02/18/24  0735 02/18/24  0406 02/17/24  0931 02/17/24  0635 02/17/24  0135 02/16/24  1923 02/16/24  1812   WBC 8.15  --  7.33  --  9.14  --   --  7.41   HEMOGLOBIN 11.2*  --  11.6*  --  12.8  --   --  12.9   HEMATOCRIT 35.9  --  39.7  --  44.2  --   --  45.6    PLATELETS 146  --  159  --  164  --   --  187   NEUTROS ABS 5.84  --  4.71  --  6.19  --   --  5.15   IMMATURE GRANS (ABS) 0.06*  --  0.03  --  0.05  --   --  0.03   LYMPHS ABS 1.28  --  1.57  --  1.68  --   --  1.29   MONOS ABS 0.75  --  0.88  --  1.12*  --   --  0.89   EOS ABS 0.20  --  0.11  --  0.07  --   --  0.03   MCV 95.2  --  101.3*  --  104.7*  --   --  104.8*   PROCALCITONIN  --   --   --   --   --   --   --  0.04   LACTATE  --   --   --   --   --   --   --  1.5   LACTATE, ARTERIAL  --  1.33  --  1.46  --  1.71   < >  --    PROTIME  --   --   --   --   --   --   --  17.9*    < > = values in this interval not displayed.         Lab 02/21/24  0421 02/20/24  0404 02/19/24  0654 02/19/24  0609 02/18/24  0735 02/18/24  0406 02/17/24  0931 02/17/24  0635 02/17/24  0135 02/16/24  1924   SODIUM 130* 133*  --  137  --    < >  --  137  --   --    SODIUM, ARTERIAL  --   --   --   --  136.7  --  134.5*  --    < >  --    POTASSIUM 2.8* 2.7*  --  3.3*  --    < >  --  4.9  --   --    CHLORIDE 81* 83*  --  85*  --    < >  --  93*  --   --    CO2 43.6* 47.3*  --  43.2*  --    < >  --  38.1*  --   --    ANION GAP 5.4 2.7*  --  8.8  --    < >  --  5.9  --   --    BUN 17 18  --  21  --    < >  --  28*  --   --    CREATININE 0.69 0.60  --  0.67  --    < >  --  0.85  --   --    EGFR 85.2 88.1  --  85.8  --    < >  --  67.2  --   --    GLUCOSE 72 80  --  132*  --    < >  --  93  --   --    GLUCOSE, ARTERIAL  --   --   --   --  78  --  144*  --    < >  --    CALCIUM 8.7 8.3*  --  8.5*  --    < >  --  9.4  --   --    IONIZED CALCIUM  --   --  0.99*  --  1.13  --  1.20  --    < >  --    MAGNESIUM  --  1.9  --  0.7*  --   --   --  1.6  --   --    PHOSPHORUS 2.5 2.8  --  1.3*  --   --   --  2.9   < >  --    TSH  --   --   --   --   --   --   --   --   --  1.960    < > = values in this interval not displayed.         Lab 02/21/24  0421 02/20/24  0404 02/17/24  0635 02/16/24  1812   TOTAL PROTEIN  --   --  5.4* 5.7*   ALBUMIN 2.5*  2.4* 2.8* 2.9*   GLOBULIN  --   --  2.6 2.8   ALT (SGPT)  --   --  12 13   AST (SGOT)  --   --  20 21   BILIRUBIN  --   --  0.4 0.3   ALK PHOS  --   --  58 69         Lab 02/20/24  0404 02/18/24  1601 02/18/24  0634 02/18/24  0406 02/16/24  1924 02/16/24  1812   PROBNP 6,525.0*  --   --   --   --  19,598.0*   HSTROP T  --  142* 152* 155*   < > 147*   PROTIME  --   --   --   --   --  17.9*   INR  --   --   --   --   --  1.45*    < > = values in this interval not displayed.                 Lab 02/18/24  0735 02/17/24  1444 02/17/24  0931   PH, ARTERIAL 7.384 7.385 7.334*   PCO2, ARTERIAL 75.2* 66.3* 83.5*   PO2 ART 80.7 140.3* 149.7*   O2 SATURATION ART 95.5 98.7 98.8   FIO2 75 70 80   HCO3 ART 43.9* 38.8* 43.4*   BASE EXCESS ART 15.4* 11.1* 13.6*   CARBOXYHEMOGLOBIN 0.3 0.4 0.4     Brief Urine Lab Results  (Last result in the past 365 days)        Color   Clarity   Blood   Leuk Est   Nitrite   Protein   CREAT   Urine HCG        02/1940 Dark Yellow   Turbid   Trace   Large (3+)   Negative   30 mg/dL (1+)                 [x]  Microbiology   Microbiology Results (last 10 days)       Procedure Component Value - Date/Time    Respiratory Panel PCR w/COVID-19(SARS-CoV-2) ANGELICA/PHI/PATTI/PAD/COR/TK In-House, NP Swab in UTM/VTM, 2 HR TAT - Swab, Nasopharynx [[id number]]  (Normal) Collected: 02/17/24 1211    Lab Status: Final result Specimen: Swab from Nasopharynx Updated: 02/17/24 130     ADENOVIRUS, PCR Not Detected     Coronavirus 229E Not Detected     Coronavirus HKU1 Not Detected     Coronavirus NL63 Not Detected     Coronavirus OC43 Not Detected     COVID19 Not Detected     Human Metapneumovirus Not Detected     Human Rhinovirus/Enterovirus Not Detected     Influenza A PCR Not Detected     Influenza B PCR Not Detected     Parainfluenza Virus 1 Not Detected     Parainfluenza Virus 2 Not Detected     Parainfluenza Virus 3 Not Detected     Parainfluenza Virus 4 Not Detected     RSV, PCR Not Detected     Bordetella  pertussis pcr Not Detected     Bordetella parapertussis PCR Not Detected     Chlamydophila pneumoniae PCR Not Detected     Mycoplasma pneumo by PCR Not Detected    Narrative:      In the setting of a positive respiratory panel with a viral infection PLUS a negative procalcitonin without other underlying concern for bacterial infection, consider observing off antibiotics or discontinuation of antibiotics and continue supportive care. If the respiratory panel is positive for atypical bacterial infection (Bordetella pertussis, Chlamydophila pneumoniae, or Mycoplasma pneumoniae), consider antibiotic de-escalation to target atypical bacterial infection.    S. Pneumo Ag Urine or CSF - Urine, Urine, Clean Catch [[id number]]  (Normal) Collected: 02/17/24 1210    Lab Status: Final result Specimen: Urine, Clean Catch Updated: 02/17/24 1228     Strep Pneumo Ag Negative    Legionella Antigen, Urine - Urine, Urine, Clean Catch [[id number]]  (Normal) Collected: 02/17/24 1210    Lab Status: Final result Specimen: Urine, Clean Catch Updated: 02/17/24 1229     LEGIONELLA ANTIGEN, URINE Negative    COVID-19, FLU A/B, RSV PCR 1 HR TAT - Swab, Nasopharynx [[id number]]  (Normal) Collected: 02/16/24 1950    Lab Status: Final result Specimen: Swab from Nasopharynx Updated: 02/16/24 2040     COVID19 Not Detected     Influenza A PCR Not Detected     Influenza B PCR Not Detected     RSV, PCR Not Detected    Narrative:      Fact sheet for providers: https://www.fda.gov/media/837319/download    Fact sheet for patients: https://www.fda.gov/media/731442/download    Test performed by PCR.    Blood Culture - Blood, Arm, Right [[id number]]  (Normal) Collected: 02/16/24 1946    Lab Status: Preliminary result Specimen: Blood from Arm, Right Updated: 02/20/24 2001     Blood Culture No growth at 4 days    Blood Culture - Blood, Arm, Right [[id number]]  (Abnormal) Collected: 02/16/24 1946    Lab Status: Final result Specimen: Blood from Arm, Right Updated:  02/20/24 0636     Blood Culture Staphylococcus, coagulase negative     Isolated from Aerobic and Anaerobic Bottles     Gram Stain Aerobic Bottle Gram positive cocci in clusters      Anaerobic Bottle Gram positive cocci in clusters    Narrative:      Probable contaminant requires clinical correlation, susceptibility not performed unless requested by physician.      Blood Culture ID, PCR - Blood, Arm, Right [[id number]]  (Abnormal) Collected: 02/16/24 1946    Lab Status: Final result Specimen: Blood from Arm, Right Updated: 02/17/24 1922     BCID, PCR Staph spp, not aureus or lugdunensis. Identification by BCID2 PCR.     BOTTLE TYPE Aerobic Bottle    Urine Culture - Urine, Urine, Clean Catch [[id number]]  (Abnormal)  (Susceptibility) Collected: 02/1940    Lab Status: Final result Specimen: Urine, Clean Catch Updated: 02/18/24 1105     Urine Culture >100,000 CFU/mL Escherichia coli    Narrative:      Colonization of the urinary tract without infection is common. Treatment is discouraged unless the patient is symptomatic, pregnant, or undergoing an invasive urologic procedure.    Susceptibility        Escherichia coli      DAVIN      Amoxicillin + Clavulanate Intermediate      Ampicillin Resistant      Ampicillin + Sulbactam Resistant      Cefazolin Susceptible      Cefepime Susceptible      Ceftazidime Susceptible      Ceftriaxone Susceptible      Gentamicin Susceptible      Levofloxacin Resistant      Nitrofurantoin Susceptible      Piperacillin + Tazobactam Susceptible      Trimethoprim + Sulfamethoxazole Resistant                                 [x]  Radiology  XR Chest 1 View    Result Date: 2/18/2024   No significant interval change.       SIMON MCDONNELL MD       Electronically Signed and Approved By: SIMON MCDONNELL MD on 2/18/2024 at 23:26             XR Chest 1 View    Result Date: 2/17/2024    1. Mild-to-moderate bilateral perihilar and basilar airspace opacity suspected to represent pulmonary edema.   Superimposed pneumonia is not excluded 2. Suspected small bilateral pleural effusions       Tavo Law M.D.       Electronically Signed and Approved By: Tavo Law M.D. on 2/17/2024 at 8:14             CT Head Without Contrast    Result Date: 2/16/2024   1. No acute findings in the brain. 2. Opacification of the mastoid air cells and middle ear cavities may reflect otomastoiditis.      SIMON MCDONNELL MD       Electronically Signed and Approved By: SIMON MCDONNELL MD on 2/16/2024 at 22:17            []  EKG/Telemetry   [x]  Cardiology/Vascular     2D echo  •  Left ventricular ejection fraction appears to be 56 - 60%.  •  Left ventricular wall thickness is consistent with septal asymmetric hypertrophy.  •  Left ventricular diastolic function is consistent with (grade I) impaired relaxation and age.  •  The right ventricular cavity is mildly dilated.  •  Mild to moderate tricuspid regurgitation.    []  Pathology  []  Old records  []  Other:    Assessment & Plan   Assessment / Plan     Assessment:  Acute metabolic encephalopathy DDX: UTI, worsening dementia,hypercapnia.  Improving  Acute hypoxic and hypercapnic respiratory failure.  Improving  E. coli UTI  Coagulase-negative staph in blood culture.  Likely contamination  Hyperkalemia.  Resolved  Hypokalemia  Elevated troponin likely NSTEMI type II from hypoxia, CHF exacerbation  Acute on chronic diastolic congestive heart failure  Hypothyroidism  History of SLE  Mild to moderate mitral valve regurgitation  Episode of hypoglycemia    Plan:  Continue to monitor on telemetry  Agree with repeat IV Diamox  Resumed home metoprolol..  As needed IV Lopressor.  Resumed home Synthroid and KCl.  Follow-up on blood cultures.  Coagulase-negative staph.  Most likely contamination.  E. coli MDR but sensitive to Rocephin.  Continue BiPAP at bedtime  Pulmonology consulted for further assistance in management.  Consult is greatly appreciated.    TSH within normal limits  Saline  nasal drops  2D echo results noted.  Continue Lasix.  BNP trending down  Monitor strict I's and O's and daily weights  POCT glucose every 6 hours.  Of D5 water drip  Palliative care consulted.  Patient is DNR/DNI.  Discussed with son and palliative care.  Recommend comfort measures because of dementia, patient's age and comorbidities.  Son to make decision.  Hospice meeting planned for February 22.    Appreciate speech therapy input started on puréed solids and thin liquids.  Discussed with RN and .  Return to nursing home in next couple of days if oxygen improves    DVT prophylaxis:  Medical DVT prophylaxis orders are present.        CODE STATUS:   Medical Intervention Limits: NO intubation (DNI)  Level Of Support Discussed With: Health Care Surrogate  Code Status (Patient has no pulse and is not breathing): No CPR (Do Not Attempt to Resuscitate)  Medical Interventions (Patient has pulse or is breathing): Limited Support      Electronically signed by Jayme Sahu MD, 2/21/2024, 15:56 EST.

## 2024-02-21 NOTE — SIGNIFICANT NOTE
02/20/24 9838   Spiritual Care   Use of Spiritual Resources non-Christianity use of spiritual care   Spiritual Care Source nurse referral   Spiritual Care Follow-Up will follow closely   Response to Spiritual Care thanks expressed;engaged in conversation   Spiritual Care Interventions supportive conversation provided   Spiritual Care Visit Type initial   Spiritual Care Request coping/stress of illness support;spiritual/moral support   Receptivity to Spiritual Care visit welcomed     Visit made with pt on 5STU. At time of visit, pt sitting up and attempting to watch tv, pt's son not at bedside at time of visit. Introductions made and role explained to pt. No needs expressed at this time.  to attempt to follow up with pt and her son at another time to provide support.

## 2024-02-21 NOTE — PROGRESS NOTES
RT EQUIPMENT DEVICE RELATED - SKIN ASSESSMENT    RT Medical Equipment/Device:     NIV Mask:  Under-the-nose   size:  b    Skin Assessment:      Nose:  Intact    Device Skin Pressure Protection:  Pressure points protected    Nurse Notification:  Marce Adams RRT

## 2024-02-21 NOTE — NURSING NOTE
"The patient is on 5stu, on nasal canula 6 liters, heart monitor in use, reports hurts all over and was rubbing on her upper legs. I asked what she usually takes for the pain and she responded, \"prednisone\". The patient does not have this on her home med list but did have gabapentin. The patient also reported that tylenol does help so I asked the nurse give her a tylenol. Currently no family or friends at the bedside. EMS DNR in [facility] from Dec. And [facility] will meet with the patient and son tomorrow at 11:30. Palliative care will follow up post [facility] visit.     -Fouzia YOUNG, RN, PN   Palliative Care    2/21/2024 14:00 EST    "

## 2024-02-22 VITALS
BODY MASS INDEX: 25.59 KG/M2 | WEIGHT: 144.4 LBS | HEART RATE: 76 BPM | DIASTOLIC BLOOD PRESSURE: 68 MMHG | RESPIRATION RATE: 18 BRPM | OXYGEN SATURATION: 96 % | SYSTOLIC BLOOD PRESSURE: 110 MMHG | TEMPERATURE: 97.7 F | HEIGHT: 63 IN

## 2024-02-22 PROBLEM — N39.0 URINARY TRACT INFECTION WITHOUT HEMATURIA: Status: RESOLVED | Noted: 2024-02-22 | Resolved: 2024-02-22

## 2024-02-22 PROBLEM — N39.0 URINARY TRACT INFECTION WITHOUT HEMATURIA: Status: ACTIVE | Noted: 2024-02-22

## 2024-02-22 PROBLEM — G93.41 ACUTE METABOLIC ENCEPHALOPATHY: Status: RESOLVED | Noted: 2024-02-16 | Resolved: 2024-02-22

## 2024-02-22 LAB
ALBUMIN SERPL-MCNC: 2.9 G/DL (ref 3.5–5.2)
ANION GAP SERPL CALCULATED.3IONS-SCNC: 5.9 MMOL/L (ref 5–15)
BUN SERPL-MCNC: 20 MG/DL (ref 8–23)
BUN/CREAT SERPL: 23.5 (ref 7–25)
CALCIUM SPEC-SCNC: 9 MG/DL (ref 8.6–10.5)
CHLORIDE SERPL-SCNC: 84 MMOL/L (ref 98–107)
CO2 SERPL-SCNC: 40.1 MMOL/L (ref 22–29)
CREAT SERPL-MCNC: 0.85 MG/DL (ref 0.57–1)
EGFRCR SERPLBLD CKD-EPI 2021: 67.2 ML/MIN/1.73
GLUCOSE BLDC GLUCOMTR-MCNC: 102 MG/DL (ref 70–99)
GLUCOSE BLDC GLUCOMTR-MCNC: 68 MG/DL (ref 70–99)
GLUCOSE BLDC GLUCOMTR-MCNC: 77 MG/DL (ref 70–99)
GLUCOSE BLDC GLUCOMTR-MCNC: 83 MG/DL (ref 70–99)
GLUCOSE SERPL-MCNC: 67 MG/DL (ref 65–99)
PHOSPHATE SERPL-MCNC: 2.9 MG/DL (ref 2.5–4.5)
POTASSIUM SERPL-SCNC: 5.2 MMOL/L (ref 3.5–5.2)
SODIUM SERPL-SCNC: 130 MMOL/L (ref 136–145)
WHOLE BLOOD HOLD SPECIMEN: NORMAL

## 2024-02-22 PROCEDURE — 25010000002 FUROSEMIDE PER 20 MG: Performed by: INTERNAL MEDICINE

## 2024-02-22 PROCEDURE — 25010000002 ACETAZOLAMIDE PER 500 MG: Performed by: INTERNAL MEDICINE

## 2024-02-22 PROCEDURE — 94761 N-INVAS EAR/PLS OXIMETRY MLT: CPT

## 2024-02-22 PROCEDURE — 94799 UNLISTED PULMONARY SVC/PX: CPT

## 2024-02-22 PROCEDURE — 94664 DEMO&/EVAL PT USE INHALER: CPT

## 2024-02-22 PROCEDURE — 99239 HOSP IP/OBS DSCHRG MGMT >30: CPT | Performed by: INTERNAL MEDICINE

## 2024-02-22 PROCEDURE — 25010000002 HEPARIN (PORCINE) PER 1000 UNITS: Performed by: STUDENT IN AN ORGANIZED HEALTH CARE EDUCATION/TRAINING PROGRAM

## 2024-02-22 PROCEDURE — 82948 REAGENT STRIP/BLOOD GLUCOSE: CPT

## 2024-02-22 PROCEDURE — 80069 RENAL FUNCTION PANEL: CPT | Performed by: INTERNAL MEDICINE

## 2024-02-22 PROCEDURE — 99233 SBSQ HOSP IP/OBS HIGH 50: CPT | Performed by: INTERNAL MEDICINE

## 2024-02-22 PROCEDURE — 25010000002 CEFTRIAXONE PER 250 MG: Performed by: INTERNAL MEDICINE

## 2024-02-22 RX ORDER — CHOLECALCIFEROL (VITAMIN D3) 125 MCG
10 CAPSULE ORAL NIGHTLY
Status: DISCONTINUED | OUTPATIENT
Start: 2024-02-22 | End: 2024-02-22 | Stop reason: HOSPADM

## 2024-02-22 RX ORDER — ACETAZOLAMIDE 500 MG/5ML
500 INJECTION, POWDER, LYOPHILIZED, FOR SOLUTION INTRAVENOUS ONCE
Status: COMPLETED | OUTPATIENT
Start: 2024-02-22 | End: 2024-02-22

## 2024-02-22 RX ORDER — FUROSEMIDE 40 MG/1
40 TABLET ORAL DAILY
Qty: 30 TABLET | Refills: 11 | Status: SHIPPED | OUTPATIENT
Start: 2024-02-22 | End: 2025-02-21

## 2024-02-22 RX ORDER — FAMOTIDINE 20 MG/1
20 TABLET, FILM COATED ORAL NIGHTLY
Qty: 30 TABLET | Refills: 0 | Status: SHIPPED | OUTPATIENT
Start: 2024-02-22 | End: 2024-03-23

## 2024-02-22 RX ADMIN — ASPIRIN 81 MG: 81 TABLET, COATED ORAL at 09:20

## 2024-02-22 RX ADMIN — Medication 10 MG: at 00:46

## 2024-02-22 RX ADMIN — METOPROLOL SUCCINATE 12.5 MG: 25 TABLET, EXTENDED RELEASE ORAL at 09:22

## 2024-02-22 RX ADMIN — ARFORMOTEROL TARTRATE 15 MCG: 15 SOLUTION RESPIRATORY (INHALATION) at 08:08

## 2024-02-22 RX ADMIN — HEPARIN SODIUM 5000 UNITS: 5000 INJECTION INTRAVENOUS; SUBCUTANEOUS at 05:55

## 2024-02-22 RX ADMIN — ACETAMINOPHEN 650 MG: 325 TABLET ORAL at 09:21

## 2024-02-22 RX ADMIN — BUDESONIDE 0.5 MG: 0.5 INHALANT ORAL at 08:08

## 2024-02-22 RX ADMIN — ACETAZOLAMIDE 500 MG: 500 INJECTION, POWDER, LYOPHILIZED, FOR SOLUTION INTRAVENOUS at 10:53

## 2024-02-22 RX ADMIN — Medication 10 ML: at 10:53

## 2024-02-22 RX ADMIN — CEFTRIAXONE SODIUM 1000 MG: 1 INJECTION, POWDER, FOR SOLUTION INTRAMUSCULAR; INTRAVENOUS at 10:56

## 2024-02-22 RX ADMIN — DOCUSATE SODIUM 50MG AND SENNOSIDES 8.6MG 2 TABLET: 8.6; 5 TABLET, FILM COATED ORAL at 09:22

## 2024-02-22 RX ADMIN — FUROSEMIDE 40 MG: 10 INJECTION, SOLUTION INTRAMUSCULAR; INTRAVENOUS at 09:22

## 2024-02-22 RX ADMIN — LEVOTHYROXINE SODIUM 125 MCG: 125 TABLET ORAL at 05:55

## 2024-02-22 NOTE — PLAN OF CARE
Goal Outcome Evaluation:   Same as asking patient last night, asked patient if she wanted to wear v60/ bipap unit tonight for bed and she asked what it was. Re educated patient on what unit is for and why someone would wear it. After reeducating patient said she remembered it but didn't know if she was going to wear it. Patient been on 6LHF since titrating last night, placed on 5L regular cannula. Patient sat ok after titrating, will reassess later. No issues or changes at this time.

## 2024-02-22 NOTE — PLAN OF CARE
Goal Outcome Evaluation:  Plan of Care Reviewed With: patient                   VSS, weaned to 3L NC (uses 3L NC at baseline). Patient is alert and oriented to self. Plan to discharge back to [facility] today. Son at bedside today for meeting with [facility] services today. [facility] services to follow at facility following discharge. Education provided. Will transport via EMS. Will CTM and provide care.

## 2024-02-22 NOTE — PROGRESS NOTES
RT EQUIPMENT DEVICE RELATED - SKIN ASSESSMENT    RT Medical Equipment/Device:     NIV Mask:  Under-the-nose   size:  B    Skin Assessment:      NECK, CHEEKS, MOUTH, NOSE:  Intact    Device Skin Pressure Protection:  Positioning supports utilized    Nurse Notification:  Marce Vasquez, RRT

## 2024-02-22 NOTE — ACP (ADVANCE CARE PLANNING)
The patient met with [facility] services today for an EOS and the son Alessandro accepted their services, The patient will return back to [facility] today, with current medication and comfort meds PRN. [facility] has been notified of discharge and request for services. Unit CM assisting with DC and coordinating with primary nurse. EMS DNR in Epic.     -Fouzia YOUNG, RN, CHPN   Palliative Care    2/22/2024 13:33 EST

## 2024-02-22 NOTE — PROGRESS NOTES
Pulmonary / Critical Care Progress Note      Patient Name: Marylou Levin  : 1940  MRN: [id number]  Primary Care Physician:  Brady Rae MD  Date of admission: 2024    Subjective   Subjective   Follow-up for hypoxic and hypercapnic respiratory failure requiring NIPPV    Down to 5 L of oxygen  Diuresing well  Remains pleasantly confused  Appears to be resting more comfortably  Dyspnea improved  No fever or chills  Denies chest pain or chest tightness  Denies cough      Objective   Objective     Vitals:   Temp:  [97.7 °F (36.5 °C)-98.2 °F (36.8 °C)] 97.7 °F (36.5 °C)  Heart Rate:  [58-77] 76  Resp:  [18] 18  BP: (105-124)/(55-82) 110/68  Flow (L/min):  [3-6] 3    Physical Exam   Vital Signs Reviewed   General: Alert, NAD.  Chronically ill-appearing elderly woman, lying in bed  Chest: Improved aeration with improving crackles on auscultation bilaterally, no work of breathing noted   CV: Sinus tachycardia, no MGR, pulses 2+, equal.  Abd:  Soft, NT, ND, + BS, no HSM, obese  EXT:  no clubbing, no cyanosis, no edema  Neuro:  A&Ox1, CN grossly intact, no focal deficits.  Skin: No rashes or lesions noted    Result Review    Result Review:  I have personally reviewed the results from the time of this admission to 2024 14:53 EST and agree with these findings:  [x]  Laboratory  [x]  Microbiology  [x]  Radiology  []  EKG/Telemetry   []  Cardiology/Vascular   []  Pathology  []  Old records  []  Other:  Most notable findings include:    echo with EF 56 to 60%, grade 1 diastolic dysfunction        Lab 24  0419 24  0421 24  0404 24  0609 24  0735 24  0406 24  0931 24  0635 24  0135 24  1923 24  1812   WBC  --   --  8.15  --   --  7.33  --  9.14  --   --  7.41   HEMOGLOBIN  --   --  11.2*  --   --  11.6*  --  12.8  --   --  12.9   HEMATOCRIT  --   --  35.9  --   --  39.7  --  44.2  --   --  45.6   PLATELETS  --   --  146  --   --  159   --  164  --   --  187   SODIUM 130* 130* 133* 137  --  136  --  137  --   --  136   SODIUM, ARTERIAL  --   --   --   --  136.7  --  134.5*  --    < >  --   --    SODIUM, VENOUS  --   --   --   --   --   --   --   --   --  133.1*  --    POTASSIUM 5.2 2.8* 2.7* 3.3*  --  4.0  --  4.9  --   --  5.8*   POTASSIUM, VENOUS  --   --   --   --   --   --   --   --   --  5.5*  --    CHLORIDE 84* 81* 83* 85*  --  91*  --  93*  --   --  92*   CHLORIDE, VENOUS  --   --   --   --   --   --   --   --   --  90*  --    CO2 40.1* 43.6* 47.3* 43.2*  --  41.6*  --  38.1*  --   --  41.7*   BUN 20 17 18 21  --  25*  --  28*  --   --  27*   CREATININE 0.85 0.69 0.60 0.67  --  0.77  --  0.85  --   --  0.83   GLUCOSE 67 72 80 132*  --  75  --  93  --   --  129*   GLUCOSE, ARTERIAL  --   --   --   --  78  --  144*  --    < > 113*  --    CALCIUM 9.0 8.7 8.3* 8.5*  --  8.8  --  9.4  --   --  9.1   PHOSPHORUS 2.9 2.5 2.8 1.3*  --   --   --  2.9  --   --   --    TOTAL PROTEIN  --   --   --   --   --   --   --  5.4*  --   --  5.7*   ALBUMIN 2.9* 2.5* 2.4*  --   --   --   --  2.8*  --   --  2.9*   GLOBULIN  --   --   --   --   --   --   --  2.6  --   --  2.8    < > = values in this interval not displayed.       Assessment & Plan   Assessment / Plan     Active Hospital Problems:  There are no active hospital problems to display for this patient.    Impression:   Acute on chronic hypoxic and hypercapnic respiratory failure requiring NIPPV, 2 L is baseline  NSTEMI likely type II  CKD stage III  Hypokalemia  Hypomagnesemia  Acute cardiogenic pulmonary edema  Acute decompensated diastolic congestive heart failure  History of mycoplasma pneumonia  History of SLE  History of A-fib/flutter, not on anticoagulation     Plan:   -Continue to wean O2 to maintain SpO2 greater than 90%.  Patient's baseline is 2 L  -Encourage NIPPV wear at nighttime and as needed with naps  -Continue Lasix 40 mg IV daily.  Redose 500 mg Diamox x 1  -Continue to monitor renal panel  and electrolytes.   -Continue nebulizers and bronchopulmonary hygiene  -Antibiotics for UTI per primary  -SLP on board, appreciate assistance  -Palliative care on board, appreciate assistance  -Recommend avoiding any sedating medications  -Patient is DNR/DNI     DVT prophylaxis:  Medical DVT prophylaxis orders are present.    CODE STATUS:   Medical Intervention Limits: NO intubation (DNI)  Level Of Support Discussed With: Health Care Surrogate  Code Status (Patient has no pulse and is not breathing): No CPR (Do Not Attempt to Resuscitate)  Medical Interventions (Patient has pulse or is breathing): Limited Support      Labs, imaging, microbiology, notes and medications personally reviewed  Discussed with primary    Electronically signed by Brian Mantilla MD, 2/22/2024, 14:53 EST.

## 2024-02-22 NOTE — PLAN OF CARE
Goal Outcome Evaluation:  Plan of Care Reviewed With: patient        Progress: improving  Outcome Evaluation: Weaned pt down to 3lpm nasal cannula this morning. She has not used BIPAP since 02/19. Pulled from room.

## 2024-02-22 NOTE — DISCHARGE SUMMARY
[facility]        HOSPITALIST  DISCHARGE SUMMARY    Patient Name: Marylou Levin  : 1940  MRN: [id number]    Date of Admission: 2024  Date of Discharge:  2024  Primary Care Physician: Brady Rae MD    Consults       Date and Time Order Name Status Description    2024  1:07 AM Inpatient Pulmonology Consult      2024 11:04 PM Inpatient Hospitalist Consult              Active and Resolved Hospital Problems:  Active Hospital Problems   No active problems to display.      Resolved Hospital Problems    Diagnosis POA   • **Acute metabolic encephalopathy [G93.41] Yes   • Urinary tract infection without hematuria [N39.0] Yes   Acute metabolic encephalopathy DDX: UTI, worsening dementia,hypercapnia.  Improving  Acute hypoxic and hypercapnic respiratory failure.  On home oxygen 2 to 2 L nasal cannula.  Improved  E. coli UTI.  Finished antibiotics  Coagulase-negative staph in blood culture.  Likely contamination  Hyperkalemia.  Resolved  Hypokalemia.  Supplemented  Elevated troponin likely NSTEMI type II from hypoxia, CHF exacerbation  Acute on chronic diastolic congestive heart failure.  Improved  Hypothyroidism  History of SLE  Mild to moderate mitral valve regurgitation.  Episode of hypoglycemia.  Resolved    Hospital Course     Hospital Course:  Marylou Levin is a 83 y.o. female   with a past medical history of dementia, hypothyroidism, SLE, dementia, presented to the ED for evaluation of decreased responsiveness.  As per the reports present to the ED physician, patient has a steady decline over the last several weeks.  Currently staying at a nursing facility.  Patient has been nonambulatory in the past 6 months.  Usually patient noted to have some intermittent confusion but able to have conversation.  For the last 2 days patient has significant decreased in response.  Only responding to the physical symptoms.  EMS was called.  Patient noted to be hypotensive  and hypoxic, started on supplemental oxygen.  Patient is unable to provide any history at this time.  Intermittently responsive.  Incoherent speech.     In the ED, vital signs temperature 91.9, pulse 79, respiratory 26, blood pressure 90s over 60s, on nonrebreather 15 L saturating around 90%.  Labs initial troponin 141, repeat troponin 146, proBNP 1 9/5/1998, VBG showed 7.25/88, sodium 136, potassium 5.8, chloride 92, bicarb 41.7, BUN 27, creatinine 0.83, rest of the CMP with no significant findings, normal ammonia, normal lactic acid, procalcitonin 0.04, urinalysis showed 21-50 WBC, 4+ bacteria, blood cultures and urine cultures in process.  COVID flu RSV negative.  Chest x-ray showed slight increase in the effusion and consolidation in the right lung bases compared to the chest x-ray on 12/12/2023.  CT head without contrast showed no acute findings.  Opacification of the mastoid air cells and middle ear cavities may reflect otomastoiditis.  Received ceftriaxone in the ED.  Patient has been admitted for further evaluation and management of acute metabolic encephalopathy, acute hypoxic and hypercapnic respiratory failure, UTI, hyperkalemia.  On February 18th RRT called last night as patient became tachypneic, tachycardic at 135, hypoxemic at 85%.  Started on BiPAP 20/8..  IV Lasix given with good response.  Telemetry shows occasional .   Patient followed by pulmonary.     Interval Followup:   Awake and alert following vocal commands.  Oriented.  Hard of hearing  Blood glucose stable  Weaning down oxygen now on 3 L with 96% saturation.  Patient has been refusing BiPAP since February 19.  Telemetry with sinus rhythm with PVCs  Complaining of nasal congestion and drainage.  States shortness of air is better.  No urinary problems   Good urine output.    Regular BM  Son MIEK had meeting today with hospice and agreed for comfort care.  At this time he is wanting to continue all her medications.  Discharge back to  nursing home with palliative care.      DISCHARGE Follow Up Recommendations for labs and diagnostics: Follow-up with hospice and PCP.  Continue home oxygen      Day of Discharge     Vital Signs:  Temp:  [97.7 °F (36.5 °C)-98.2 °F (36.8 °C)] 97.7 °F (36.5 °C)  Heart Rate:  [58-77] 76  Resp:  [18] 18  BP: (105-124)/(55-82) 110/68  Flow (L/min):  [3-6] 3    Physical Exam:   General: Awake, alert, NAD  HENT: NCAT, MMM  Eyes: pupils equal, no scleral icterus  Cardiovascular: RRR, no patient will murmurs   Pulmonary: Improved air movement bilaterally; no wheezes  Gastrointestinal: S/ND/NT, +BS  Musculoskeletal: No gross deformities  Skin: No jaundice, no rash on exposed skin appreciated  Neuro: CN II through XII grossly intact;  no tremor  Psych: Mood and affect appropriate  : No Ontiveros catheter; no suprapubic tenderness    Discharge Details        Discharge Medications        New Medications        Instructions Start Date   famotidine 20 MG tablet  Commonly known as: PEPCID   20 mg, Oral, Nightly      furosemide 40 MG tablet  Commonly known as: Lasix   40 mg, Oral, Daily      LORazepam 1 mg/mL in Ora-Plus-ora sweet-water   1 mg, Oral, Every 4 Hours PRN             Continue These Medications        Instructions Start Date   apixaban 5 MG tablet tablet  Commonly known as: ELIQUIS   5 mg, Oral, 2 Times Daily      Baclofen 5 MG tablet  Commonly known as: LIORESAL   5 mg, Oral, 3 Times Daily      HYDROcodone-acetaminophen 7.5-325 MG per tablet  Commonly known as: NORCO   1 tablet, Oral, Every 4 Hours PRN      hydroxychloroquine 200 MG tablet  Commonly known as: PLAQUENIL   200 mg, Oral, Daily      levothyroxine 125 MCG tablet  Commonly known as: SYNTHROID, LEVOTHROID   125 mcg, Oral, Daily      Ester's magic butt cream   1 Application, Topical, As Needed, 1/4 inch strip topical bid       metoprolol tartrate 25 MG tablet  Commonly known as: LOPRESSOR   12.5 mg, Oral, 2 Times Daily      nystatin 880690 UNIT/GM powder  Commonly  known as: MYCOSTATIN   1 g, Topical, 2 Times Daily      ondansetron 4 MG tablet  Commonly known as: ZOFRAN   4 mg, Oral, Every 6 Hours PRN      potassium chloride 10 MEQ CR capsule  Commonly known as: MICRO-K   10 mEq, Oral, Daily      raloxifene 60 MG tablet  Commonly known as: EVISTA   60 mg, Oral, Daily      rOPINIRole 0.5 MG tablet  Commonly known as: REQUIP   0.5 mg, Oral, Nightly, Take 1 hour before bedtime.      sertraline 50 MG tablet  Commonly known as: ZOLOFT   50 mg, Oral, Daily             Stop These Medications      gabapentin 800 MG tablet  Commonly known as: NEURONTIN              Allergies   Allergen Reactions   • Penicillins Unknown - Low Severity     Has tolerated Cephalexin and Ceftriaxone -[facility]       Discharge Disposition:  Skilled Nursing Facility (DC - External).  By EMS to [facility]    Diet:  Diet Instructions       Diet: Cardiac Diets; Healthy Heart (2-3 Na+); Pureed (NDD 1); Thin (IDDSI 0)      Discharge Diet: Cardiac Diets    Cardiac Diet: Healthy Heart (2-3 Na+)    Texture: Pureed (NDD 1)    Fluid Consistency: Thin (IDDSI 0)            Discharge Activity:   Activity Instructions       Activity as Tolerated              CODE STATUS:  Code Status and Medical Interventions:   Ordered at: 02/17/24 0105     Medical Intervention Limits:    NO intubation (DNI)     Level Of Support Discussed With:    Health Care Surrogate     Code Status (Patient has no pulse and is not breathing):    No CPR (Do Not Attempt to Resuscitate)     Medical Interventions (Patient has pulse or is breathing):    Limited Support         Future Appointments   Date Time Provider Department Center   3/12/2024 10:15 AM Yue Jacobs [place]N [facility]       Additional Instructions for the Follow-ups that You Need to Schedule       Discharge Follow-up with PCP   As directed       Currently Documented PCP:    Brady Rae MD    PCP Phone Number:    [phone]     Follow Up Details: 1 week         Discharge Follow-up with Specialty: hospice   As directed      Specialty: hospice        Discharge Follow-up with Specified Provider: dr Mantilla; 2 Weeks   As directed      To: dr Mantilla   Follow Up: 2 Weeks                Pertinent  and/or Most Recent Results     PROCEDURES:   * Cannot find OR case *     LAB RESULTS:      Lab 02/20/24 0404 02/18/24  0735 02/18/24  0406 02/17/24  0931 02/17/24  0635 02/17/24 0135 02/16/24 1923 02/16/24  1812   WBC 8.15  --  7.33  --  9.14  --   --  7.41   HEMOGLOBIN 11.2*  --  11.6*  --  12.8  --   --  12.9   HEMATOCRIT 35.9  --  39.7  --  44.2  --   --  45.6   PLATELETS 146  --  159  --  164  --   --  187   NEUTROS ABS 5.84  --  4.71  --  6.19  --   --  5.15   IMMATURE GRANS (ABS) 0.06*  --  0.03  --  0.05  --   --  0.03   LYMPHS ABS 1.28  --  1.57  --  1.68  --   --  1.29   MONOS ABS 0.75  --  0.88  --  1.12*  --   --  0.89   EOS ABS 0.20  --  0.11  --  0.07  --   --  0.03   MCV 95.2  --  101.3*  --  104.7*  --   --  104.8*   PROCALCITONIN  --   --   --   --   --   --   --  0.04   LACTATE  --   --   --   --   --   --   --  1.5   LACTATE, ARTERIAL  --  1.33  --  1.46  --  1.71 1.76  --    PROTIME  --   --   --   --   --   --   --  17.9*         Lab 02/22/24  0419 02/21/24  0421 02/20/24  0404 02/19/24  0654 02/19/24  0609 02/18/24  0735 02/18/24  0406 02/17/24  0931 02/17/24  0635 02/17/24 0135 02/16/24 1924 02/16/24 1923 02/16/24 1923   SODIUM 130* 130* 133*  --  137  --  136  --  137  --   --   --   --    SODIUM, ARTERIAL  --   --   --   --   --  136.7  --  134.5*  --  133.6*  --    < >  --    SODIUM, VENOUS  --   --   --   --   --   --   --   --   --   --   --   --  133.1*   POTASSIUM 5.2 2.8* 2.7*  --  3.3*  --  4.0  --  4.9  --   --   --   --    POTASSIUM, VENOUS  --   --   --   --   --   --   --   --   --   --   --   --  5.5*   CHLORIDE 84* 81* 83*  --  85*  --  91*  --  93*  --   --   --   --    CHLORIDE, VENOUS  --   --   --   --   --   --   --   --   --   --    --   --  90*   CO2 40.1* 43.6* 47.3*  --  43.2*  --  41.6*  --  38.1*  --   --   --   --    ANION GAP 5.9 5.4 2.7*  --  8.8  --  3.4*  --  5.9  --   --   --   --    BUN 20 17 18  --  21  --  25*  --  28*  --   --   --   --    CREATININE 0.85 0.69 0.60  --  0.67  --  0.77  --  0.85  --   --   --   --    EGFR 67.2 85.2 88.1  --  85.8  --  75.7  --  67.2  --   --   --   --    GLUCOSE 67 72 80  --  132*  --  75  --  93  --   --   --   --    GLUCOSE, ARTERIAL  --   --   --   --   --  78  --  144*  --  150*  --   --  113*   CALCIUM 9.0 8.7 8.3*  --  8.5*  --  8.8  --  9.4  --   --   --   --    IONIZED CALCIUM  --   --   --  0.99*  --  1.13  --  1.20  --  1.19  --   --  1.14   MAGNESIUM  --   --  1.9  --  0.7*  --   --   --  1.6  --   --   --   --    PHOSPHORUS 2.9 2.5 2.8  --  1.3*  --   --   --  2.9  --   --   --   --    TSH  --   --   --   --   --   --   --   --   --   --  1.960  --   --     < > = values in this interval not displayed.         Lab 02/22/24  0419 02/21/24  0421 02/20/24  0404 02/17/24  0635 02/16/24  1812   TOTAL PROTEIN  --   --   --  5.4* 5.7*   ALBUMIN 2.9* 2.5* 2.4* 2.8* 2.9*   GLOBULIN  --   --   --  2.6 2.8   ALT (SGPT)  --   --   --  12 13   AST (SGOT)  --   --   --  20 21   BILIRUBIN  --   --   --  0.4 0.3   ALK PHOS  --   --   --  58 69         Lab 02/20/24  0404 02/18/24  1601 02/18/24  0634 02/18/24  0406 02/17/24  0635 02/16/24  1924 02/16/24  1812   PROBNP 6,525.0*  --   --   --   --   --  19,598.0*   HSTROP T  --  142* 152* 155* 136* 146* 147*   PROTIME  --   --   --   --   --   --  17.9*   INR  --   --   --   --   --   --  1.45*                 Lab 02/18/24  0735 02/17/24  1444 02/17/24  0931   PH, ARTERIAL 7.384 7.385 7.334*   PCO2, ARTERIAL 75.2* 66.3* 83.5*   PO2 ART 80.7 140.3* 149.7*   O2 SATURATION ART 95.5 98.7 98.8   FIO2 75 70 80   HCO3 ART 43.9* 38.8* 43.4*   BASE EXCESS ART 15.4* 11.1* 13.6*   CARBOXYHEMOGLOBIN 0.3 0.4 0.4     Brief Urine Lab Results  (Last result in the past  365 days)        Color   Clarity   Blood   Leuk Est   Nitrite   Protein   CREAT   Urine HCG        02/1940 Dark Yellow   Turbid   Trace   Large (3+)   Negative   30 mg/dL (1+)                 Microbiology Results (last 10 days)       Procedure Component Value - Date/Time    Respiratory Panel PCR w/COVID-19(SARS-CoV-2) ANGELICA/PHI/PATTI/PAD/COR/TK In-House, NP Swab in UTM/VTM, 2 HR TAT - Swab, Nasopharynx [[id number]]  (Normal) Collected: 02/17/24 1211    Lab Status: Final result Specimen: Swab from Nasopharynx Updated: 02/17/24 1304     ADENOVIRUS, PCR Not Detected     Coronavirus 229E Not Detected     Coronavirus HKU1 Not Detected     Coronavirus NL63 Not Detected     Coronavirus OC43 Not Detected     COVID19 Not Detected     Human Metapneumovirus Not Detected     Human Rhinovirus/Enterovirus Not Detected     Influenza A PCR Not Detected     Influenza B PCR Not Detected     Parainfluenza Virus 1 Not Detected     Parainfluenza Virus 2 Not Detected     Parainfluenza Virus 3 Not Detected     Parainfluenza Virus 4 Not Detected     RSV, PCR Not Detected     Bordetella pertussis pcr Not Detected     Bordetella parapertussis PCR Not Detected     Chlamydophila pneumoniae PCR Not Detected     Mycoplasma pneumo by PCR Not Detected    Narrative:      In the setting of a positive respiratory panel with a viral infection PLUS a negative procalcitonin without other underlying concern for bacterial infection, consider observing off antibiotics or discontinuation of antibiotics and continue supportive care. If the respiratory panel is positive for atypical bacterial infection (Bordetella pertussis, Chlamydophila pneumoniae, or Mycoplasma pneumoniae), consider antibiotic de-escalation to target atypical bacterial infection.    S. Pneumo Ag Urine or CSF - Urine, Urine, Clean Catch [[id number]]  (Normal) Collected: 02/17/24 1210    Lab Status: Final result Specimen: Urine, Clean Catch Updated: 02/17/24 1228     Strep Pneumo Ag  Negative    Legionella Antigen, Urine - Urine, Urine, Clean Catch [[id number]]  (Normal) Collected: 02/17/24 1210    Lab Status: Final result Specimen: Urine, Clean Catch Updated: 02/17/24 1229     LEGIONELLA ANTIGEN, URINE Negative    COVID-19, FLU A/B, RSV PCR 1 HR TAT - Swab, Nasopharynx [[id number]]  (Normal) Collected: 02/16/24 1950    Lab Status: Final result Specimen: Swab from Nasopharynx Updated: 02/16/24 2040     COVID19 Not Detected     Influenza A PCR Not Detected     Influenza B PCR Not Detected     RSV, PCR Not Detected    Narrative:      Fact sheet for providers: https://www.fda.gov/media/745222/download    Fact sheet for patients: https://www.fda.gov/media/634744/download    Test performed by PCR.    Blood Culture - Blood, Arm, Right [[id number]]  (Normal) Collected: 02/16/24 1946    Lab Status: Final result Specimen: Blood from Arm, Right Updated: 02/21/24 2001     Blood Culture No growth at 5 days    Blood Culture - Blood, Arm, Right [[id number]]  (Abnormal) Collected: 02/16/24 1946    Lab Status: Final result Specimen: Blood from Arm, Right Updated: 02/20/24 0636     Blood Culture Staphylococcus, coagulase negative     Isolated from Aerobic and Anaerobic Bottles     Gram Stain Aerobic Bottle Gram positive cocci in clusters      Anaerobic Bottle Gram positive cocci in clusters    Narrative:      Probable contaminant requires clinical correlation, susceptibility not performed unless requested by physician.      Blood Culture ID, PCR - Blood, Arm, Right [[id number]]  (Abnormal) Collected: 02/16/24 1946    Lab Status: Final result Specimen: Blood from Arm, Right Updated: 02/17/24 1922     BCID, PCR Staph spp, not aureus or lugdunensis. Identification by BCID2 PCR.     BOTTLE TYPE Aerobic Bottle    Urine Culture - Urine, Urine, Clean Catch [[id number]]  (Abnormal)  (Susceptibility) Collected: 02/1940    Lab Status: Final result Specimen: Urine, Clean Catch Updated: 02/18/24 1105     Urine Culture  >100,000 CFU/mL Escherichia coli    Narrative:      Colonization of the urinary tract without infection is common. Treatment is discouraged unless the patient is symptomatic, pregnant, or undergoing an invasive urologic procedure.    Susceptibility        Escherichia coli      DAVIN      Amoxicillin + Clavulanate Intermediate      Ampicillin Resistant      Ampicillin + Sulbactam Resistant      Cefazolin Susceptible      Cefepime Susceptible      Ceftazidime Susceptible      Ceftriaxone Susceptible      Gentamicin Susceptible      Levofloxacin Resistant      Nitrofurantoin Susceptible      Piperacillin + Tazobactam Susceptible      Trimethoprim + Sulfamethoxazole Resistant                                   XR Chest 1 View    Result Date: 2/18/2024  Impression:  No significant interval change.       SIMON MCDONNELL MD       Electronically Signed and Approved By: SIMON MCDONNELL MD on 2/18/2024 at 23:26             XR Chest 1 View    Result Date: 2/17/2024  Impression:   1. Mild-to-moderate bilateral perihilar and basilar airspace opacity suspected to represent pulmonary edema.  Superimposed pneumonia is not excluded 2. Suspected small bilateral pleural effusions       Tavo Law M.D.       Electronically Signed and Approved By: Tavo Law M.D. on 2/17/2024 at 8:14             CT Head Without Contrast    Result Date: 2/16/2024  Impression:  1. No acute findings in the brain. 2. Opacification of the mastoid air cells and middle ear cavities may reflect otomastoiditis.      SIMON MCDONNELL MD       Electronically Signed and Approved By: SIMON MCDONNELL MD on 2/16/2024 at 22:17              Results for orders placed during the hospital encounter of 11/08/23    Duplex Lower Extremity Art / Grafts - Right CAR    Interpretation Summary  •  Normal ankle-brachial indices bilaterally.  •  Normal right infrainguinal arteries.      Results for orders placed during the hospital encounter of 11/08/23    Duplex Lower Extremity Art /  Grafts - Right CAR    Interpretation Summary  •  Normal ankle-brachial indices bilaterally.  •  Normal right infrainguinal arteries.      Results for orders placed during the hospital encounter of 02/16/24    Adult Transthoracic Echo Complete W/ Cont if Necessary Per Protocol    Interpretation Summary  •  Left ventricular ejection fraction appears to be 56 - 60%.  •  Left ventricular wall thickness is consistent with septal asymmetric hypertrophy.  •  Left ventricular diastolic function is consistent with (grade I) impaired relaxation and age.  •  The right ventricular cavity is mildly dilated.  •  Mild to moderate tricuspid regurgitation.      Imaging Results (All)       Procedure Component Value Units Date/Time    XR Chest 1 View [[id number]] Collected: 02/18/24 2326     Updated: 02/18/24 2329    Narrative:      PROCEDURE: XR CHEST 1 VW     COMPARISON: [facility], CR, XR CHEST 1 VW, 2/17/2024, 7:39.     INDICATIONS: SOA     FINDINGS:   Cardiac and mediastinal contours are unchanged.  There are persistent bilateral pleural effusions   that are similar to prior.  There is atelectasis and/or infiltrate in the mid lower lungs, also   relatively stable.  No pneumothorax.          Impression:       No significant interval change.                  SIMON MCDONNELL MD         Electronically Signed and Approved By: SIMON MCDONNELL MD on 2/18/2024 at 23:26                     XR Chest 1 View [[id number]] Collected: 02/17/24 0814     Updated: 02/17/24 0817    Narrative:      PROCEDURE: XR CHEST 1 VW     COMPARISON: [facility], CT, CT CHEST W CONTRAST DIAGNOSTIC, 12/12/2023, 16:52.    [facility], CR, XR CHEST 1 VW, 2/16/2024, 18:25.     INDICATIONS: hypoxia     FINDINGS:   There is mild-to-moderate bilateral perihilar and basilar airspace opacity.  Finding appears   similar to the previous exam.  Small bilateral pleural effusions are not excluded.  The cardiac and   mediastinal silhouettes  appear normal.       Impression:         1. Mild-to-moderate bilateral perihilar and basilar airspace opacity suspected to represent   pulmonary edema.  Superimposed pneumonia is not excluded  2. Suspected small bilateral pleural effusions                  Tavo Law M.D.         Electronically Signed and Approved By: Tavo Law M.D. on 2/17/2024 at 8:14                     CT Head Without Contrast [[id number]] Collected: 02/16/24 2114     Updated: 02/16/24 2221    Narrative:      PROCEDURE: CT HEAD WO CONTRAST     COMPARISON:  [facility], CT, HEAD W/O CONTRAST, 12/31/2019, 18:23.  INDICATIONS: headache.  Mental status change.     PROTOCOL:   Standard imaging protocol performed      RADIATION:   DLP: 1017.2 mGy*cm    MA and/or KV was adjusted to minimize radiation dose.          TECHNIQUE: After obtaining the patient's consent, CT images were obtained without non-ionic   intravenous contrast material.      FINDINGS:   Ventricular size and configuration are within normal limits.  There is age-appropriate generalized   atrophy.  No acute infarct or hemorrhage is identified.  There are no masses.  There is no skull   fracture.  There is opacification of the mastoid air cells and middle ear cavities on both sides   which may reflect otomastoiditis.  Bilateral hearing aids are present.       Impression:       1. No acute findings in the brain.  2. Opacification of the mastoid air cells and middle ear cavities may reflect otomastoiditis.              SIMON MCDONNELL MD         Electronically Signed and Approved By: SIMON MCDONNELL MD on 2/16/2024 at 22:17                     XR Chest 1 View [[id number]] Collected: 02/16/24 1824     Updated: 02/16/24 1827    Narrative:      PROCEDURE: XR CHEST 1 VW     COMPARISON: [facility], CR, XR CHEST 1 VW, 12/12/2023, 15:09.  [facility], CT, CT CHEST W CONTRAST DIAGNOSTIC, 12/12/2023, 16:52.     INDICATIONS: SOA     FINDINGS:      There is  mild cardiomegaly.  There appears to be mild pulmonary edema.  Stable elevation of the   right hemidiaphragm.  Probable slight increase in the effusion and consolidation in the lung bases.     IMPRESSION:  Slight increase in the effusion and consolidation in the lung bases.       KOBY WOODARD MD         Electronically Signed and Approved By: KOBY WOODARD MD on 2/16/2024 at 18:23                              Labs Pending at Discharge:          Time spent on Discharge including face to face service: 35 minutes  Part of this note may be an electronic transcription/translation of spoken language to printed text using the Dragon Dictation System.     TElectronically signed by Jayme Sahu MD, 02/22/24, 1:21 PM EST.

## 2024-03-06 LAB
QT INTERVAL: 374 MS
QTC INTERVAL: 393 MS